# Patient Record
Sex: FEMALE | Race: WHITE | NOT HISPANIC OR LATINO | Employment: FULL TIME | URBAN - NONMETROPOLITAN AREA
[De-identification: names, ages, dates, MRNs, and addresses within clinical notes are randomized per-mention and may not be internally consistent; named-entity substitution may affect disease eponyms.]

---
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
## 2018-01-03 ENCOUNTER — APPOINTMENT (OUTPATIENT)
Dept: ULTRASOUND IMAGING | Facility: HOSPITAL | Age: 18
End: 2018-01-03

## 2018-01-03 ENCOUNTER — HOSPITAL ENCOUNTER (EMERGENCY)
Facility: HOSPITAL | Age: 18
Discharge: HOME | End: 2018-01-03
Attending: EMERGENCY MEDICINE | Admitting: EMERGENCY MEDICINE

## 2018-01-03 VITALS
SYSTOLIC BLOOD PRESSURE: 122 MMHG | WEIGHT: 135 LBS | TEMPERATURE: 98 F | HEIGHT: 65 IN | DIASTOLIC BLOOD PRESSURE: 71 MMHG | HEART RATE: 94 BPM | BODY MASS INDEX: 22.49 KG/M2 | OXYGEN SATURATION: 98 % | RESPIRATION RATE: 17 BRPM

## 2018-01-03 DIAGNOSIS — N39.0 ACUTE UTI: ICD-10-CM

## 2018-01-03 DIAGNOSIS — K80.50 BILIARY COLIC: Primary | ICD-10-CM

## 2018-01-03 LAB
ALBUMIN SERPL-MCNC: 4.7 G/DL (ref 3.5–5)
ALBUMIN/GLOB SERPL: 1.5 G/DL (ref 1.5–2.5)
ALP SERPL-CCNC: 73 U/L (ref 35–104)
ALT SERPL W P-5'-P-CCNC: 14 U/L (ref 10–36)
ANION GAP SERPL CALCULATED.3IONS-SCNC: 6.6 MMOL/L (ref 3.6–11.2)
AST SERPL-CCNC: 17 U/L (ref 10–30)
BACTERIA UR QL AUTO: ABNORMAL /HPF
BASOPHILS # BLD AUTO: 0.03 10*3/MM3 (ref 0–0.3)
BASOPHILS NFR BLD AUTO: 0.4 % (ref 0–2)
BILIRUB SERPL-MCNC: 0.4 MG/DL (ref 0.2–1.8)
BILIRUB UR QL STRIP: NEGATIVE
BUN BLD-MCNC: 14 MG/DL (ref 7–21)
BUN/CREAT SERPL: 17.9 (ref 7–25)
CALCIUM SPEC-SCNC: 9.5 MG/DL (ref 7.7–10)
CHLORIDE SERPL-SCNC: 106 MMOL/L (ref 99–112)
CLARITY UR: ABNORMAL
CO2 SERPL-SCNC: 28.4 MMOL/L (ref 24.3–31.9)
COLOR UR: YELLOW
CREAT BLD-MCNC: 0.78 MG/DL (ref 0.43–1.29)
DEPRECATED RDW RBC AUTO: 40.3 FL (ref 37–54)
EOSINOPHIL # BLD AUTO: 0.21 10*3/MM3 (ref 0–0.7)
EOSINOPHIL NFR BLD AUTO: 2.8 % (ref 0–5)
ERYTHROCYTE [DISTWIDTH] IN BLOOD BY AUTOMATED COUNT: 14 % (ref 11.5–14.5)
GFR SERPL CREATININE-BSD FRML MDRD: 95 ML/MIN/1.73
GLOBULIN UR ELPH-MCNC: 3.1 GM/DL
GLUCOSE BLD-MCNC: 90 MG/DL (ref 70–110)
GLUCOSE UR STRIP-MCNC: NEGATIVE MG/DL
HCG SERPL QL: NEGATIVE
HCT VFR BLD AUTO: 40.7 % (ref 37–47)
HGB BLD-MCNC: 13.2 G/DL (ref 12–16)
HGB UR QL STRIP.AUTO: NEGATIVE
HOLD SPECIMEN: NORMAL
HOLD SPECIMEN: NORMAL
HYALINE CASTS UR QL AUTO: ABNORMAL /LPF
IMM GRANULOCYTES # BLD: 0 10*3/MM3 (ref 0–0.03)
IMM GRANULOCYTES NFR BLD: 0 % (ref 0–0.5)
KETONES UR QL STRIP: NEGATIVE
LEUKOCYTE ESTERASE UR QL STRIP.AUTO: ABNORMAL
LIPASE SERPL-CCNC: 31 U/L (ref 13–60)
LYMPHOCYTES # BLD AUTO: 3.07 10*3/MM3 (ref 1–3)
LYMPHOCYTES NFR BLD AUTO: 41.7 % (ref 21–51)
MCH RBC QN AUTO: 26.3 PG (ref 27–33)
MCHC RBC AUTO-ENTMCNC: 32.4 G/DL (ref 33–37)
MCV RBC AUTO: 81.2 FL (ref 80–94)
MONOCYTES # BLD AUTO: 0.45 10*3/MM3 (ref 0.1–0.9)
MONOCYTES NFR BLD AUTO: 6.1 % (ref 0–10)
NEUTROPHILS # BLD AUTO: 3.61 10*3/MM3 (ref 1.4–6.5)
NEUTROPHILS NFR BLD AUTO: 49 % (ref 30–70)
NITRITE UR QL STRIP: POSITIVE
OSMOLALITY SERPL CALC.SUM OF ELEC: 281.3 MOSM/KG (ref 273–305)
PH UR STRIP.AUTO: 7.5 [PH] (ref 5–8)
PLATELET # BLD AUTO: 249 10*3/MM3 (ref 130–400)
PMV BLD AUTO: 11 FL (ref 6–10)
POTASSIUM BLD-SCNC: 4.1 MMOL/L (ref 3.5–5.3)
PROT SERPL-MCNC: 7.8 G/DL (ref 6–8)
PROT UR QL STRIP: ABNORMAL
RBC # BLD AUTO: 5.01 10*6/MM3 (ref 4.2–5.4)
RBC # UR: ABNORMAL /HPF
REF LAB TEST METHOD: ABNORMAL
SODIUM BLD-SCNC: 141 MMOL/L (ref 135–153)
SP GR UR STRIP: 1.02 (ref 1–1.03)
SQUAMOUS #/AREA URNS HPF: ABNORMAL /HPF
TRI-PHOS CRY URNS QL MICRO: ABNORMAL /HPF
UROBILINOGEN UR QL STRIP: ABNORMAL
WBC NRBC COR # BLD: 7.37 10*3/MM3 (ref 4.5–12.5)
WBC UR QL AUTO: ABNORMAL /HPF
WHOLE BLOOD HOLD SPECIMEN: NORMAL
WHOLE BLOOD HOLD SPECIMEN: NORMAL

## 2018-01-03 PROCEDURE — 87147 CULTURE TYPE IMMUNOLOGIC: CPT | Performed by: EMERGENCY MEDICINE

## 2018-01-03 PROCEDURE — 80053 COMPREHEN METABOLIC PANEL: CPT | Performed by: EMERGENCY MEDICINE

## 2018-01-03 PROCEDURE — 25010000002 CEFTRIAXONE PER 250 MG: Performed by: PHYSICIAN ASSISTANT

## 2018-01-03 PROCEDURE — 87086 URINE CULTURE/COLONY COUNT: CPT | Performed by: EMERGENCY MEDICINE

## 2018-01-03 PROCEDURE — 81001 URINALYSIS AUTO W/SCOPE: CPT | Performed by: EMERGENCY MEDICINE

## 2018-01-03 PROCEDURE — 85025 COMPLETE CBC W/AUTO DIFF WBC: CPT | Performed by: EMERGENCY MEDICINE

## 2018-01-03 PROCEDURE — 99283 EMERGENCY DEPT VISIT LOW MDM: CPT

## 2018-01-03 PROCEDURE — 76705 ECHO EXAM OF ABDOMEN: CPT

## 2018-01-03 PROCEDURE — 87077 CULTURE AEROBIC IDENTIFY: CPT | Performed by: EMERGENCY MEDICINE

## 2018-01-03 PROCEDURE — 87186 SC STD MICRODIL/AGAR DIL: CPT | Performed by: EMERGENCY MEDICINE

## 2018-01-03 PROCEDURE — 84703 CHORIONIC GONADOTROPIN ASSAY: CPT | Performed by: EMERGENCY MEDICINE

## 2018-01-03 PROCEDURE — 96372 THER/PROPH/DIAG INJ SC/IM: CPT

## 2018-01-03 PROCEDURE — 83690 ASSAY OF LIPASE: CPT | Performed by: EMERGENCY MEDICINE

## 2018-01-03 PROCEDURE — 76705 ECHO EXAM OF ABDOMEN: CPT | Performed by: RADIOLOGY

## 2018-01-03 RX ORDER — SULFAMETHOXAZOLE AND TRIMETHOPRIM 800; 160 MG/1; MG/1
1 TABLET ORAL 2 TIMES DAILY
COMMUNITY
End: 2018-01-03

## 2018-01-03 RX ORDER — LIDOCAINE HYDROCHLORIDE 10 MG/ML
2.1 INJECTION, SOLUTION EPIDURAL; INFILTRATION; INTRACAUDAL; PERINEURAL ONCE
Status: COMPLETED | OUTPATIENT
Start: 2018-01-03 | End: 2018-01-03

## 2018-01-03 RX ORDER — SULFAMETHOXAZOLE AND TRIMETHOPRIM 800; 160 MG/1; MG/1
1 TABLET ORAL 2 TIMES DAILY
Qty: 20 TABLET | Refills: 0 | Status: SHIPPED | OUTPATIENT
Start: 2018-01-03 | End: 2019-04-01 | Stop reason: HOSPADM

## 2018-01-03 RX ORDER — ONDANSETRON 4 MG/1
4 TABLET, ORALLY DISINTEGRATING ORAL EVERY 6 HOURS PRN
Qty: 10 TABLET | Refills: 0 | Status: ON HOLD | OUTPATIENT
Start: 2018-01-03 | End: 2019-07-03

## 2018-01-03 RX ORDER — CEFTRIAXONE 1 G/1
1000 INJECTION, POWDER, FOR SOLUTION INTRAMUSCULAR; INTRAVENOUS ONCE
Status: COMPLETED | OUTPATIENT
Start: 2018-01-03 | End: 2018-01-03

## 2018-01-03 RX ORDER — PANTOPRAZOLE SODIUM 40 MG/1
40 TABLET, DELAYED RELEASE ORAL DAILY
Qty: 30 TABLET | Refills: 0 | Status: ON HOLD | OUTPATIENT
Start: 2018-01-03 | End: 2019-07-03

## 2018-01-03 RX ORDER — ONDANSETRON 4 MG/1
4 TABLET, ORALLY DISINTEGRATING ORAL ONCE
Status: COMPLETED | OUTPATIENT
Start: 2018-01-03 | End: 2018-01-03

## 2018-01-03 RX ADMIN — CEFTRIAXONE 1000 MG: 1 INJECTION, POWDER, FOR SOLUTION INTRAMUSCULAR; INTRAVENOUS at 15:59

## 2018-01-03 RX ADMIN — ONDANSETRON 4 MG: 4 TABLET, ORALLY DISINTEGRATING ORAL at 15:58

## 2018-01-03 RX ADMIN — LIDOCAINE HYDROCHLORIDE 2.1 ML: 10 INJECTION, SOLUTION EPIDURAL; INFILTRATION; INTRACAUDAL; PERINEURAL at 15:59

## 2018-01-03 NOTE — ED PROVIDER NOTES
Subjective   Patient is a 17 y.o. female presenting with abdominal pain.   History provided by:  Patient  Abdominal Pain   Pain location:  Epigastric  Pain quality: aching and stabbing    Pain radiates to:  Does not radiate  Pain severity:  Moderate  Onset quality:  Sudden  Duration:  2 hours  Timing:  Constant  Progression:  Worsening  Chronicity:  Recurrent  Relieved by:  Nothing  Ineffective treatments:  None tried  Associated symptoms: nausea    Associated symptoms: no chest pain, no dysuria, no fever and no vomiting        Review of Systems   Constitutional: Negative.  Negative for fever.   HENT: Negative.    Respiratory: Negative.    Cardiovascular: Negative.  Negative for chest pain.   Gastrointestinal: Positive for abdominal pain and nausea. Negative for vomiting.   Endocrine: Negative.    Genitourinary: Negative.  Negative for dysuria.   Skin: Negative.    Neurological: Negative.    Psychiatric/Behavioral: Negative.    All other systems reviewed and are negative.      Past Medical History:   Diagnosis Date   • H/O seasonal allergies    • Psoriasis    • Psoriasis        No Known Allergies    Past Surgical History:   Procedure Laterality Date   • DILATATION AND CURETTAGE N/A 9/1/2017    Procedure: DILATATION AND CURETTAGE;  Surgeon: Juan Pablo Wood MD;  Location: [facility];  Service:        Family History   Problem Relation Age of Onset   • Diabetes Father        Social History     Social History   • Marital status: Single     Spouse name: N/A   • Number of children: N/A   • Years of education: N/A     Social History Main Topics   • Smoking status: Former Smoker     Packs/day: 0.50     Types: Cigarettes   • Smokeless tobacco: Never Used   • Alcohol use No   • Drug use: No   • Sexual activity: Defer     Other Topics Concern   • Not on file     Social History Narrative   • No narrative on file           Objective   Physical Exam   Constitutional: She is oriented to person, place, and time. She appears  well-developed and well-nourished. No distress.   HENT:   Head: Normocephalic and atraumatic.   Nose: Nose normal.   Eyes: Conjunctivae are normal.   Neck: Normal range of motion. Neck supple. No JVD present. No tracheal deviation present.   Cardiovascular: Normal rate, regular rhythm and normal heart sounds.    No murmur heard.  Pulmonary/Chest: Effort normal and breath sounds normal. No respiratory distress. She has no wheezes.   Abdominal: Soft. Bowel sounds are normal. There is tenderness (epigastric / RUQ).   Musculoskeletal: Normal range of motion. She exhibits no edema or deformity.   Neurological: She is alert and oriented to person, place, and time. No cranial nerve deficit.   Skin: Skin is warm and dry. No rash noted. She is not diaphoretic. No erythema. No pallor.   Psychiatric: She has a normal mood and affect. Her behavior is normal. Thought content normal.   Nursing note and vitals reviewed.      Procedures         ED Course  ED Course   Comment By Time   Patient is requesting to leave. JANY Tariq 01/03 1632   Ultrasound gallbladder interpreted by virtual radiology: No acute findings. JANY Tariq 01/04 0046                  MDM  Number of Diagnoses or Management Options  Acute UTI: new and requires workup  Biliary colic: new and requires workup     Amount and/or Complexity of Data Reviewed  Clinical lab tests: reviewed    Risk of Complications, Morbidity, and/or Mortality  Presenting problems: low  Diagnostic procedures: low  Management options: low    Patient Progress  Patient progress: stable      Final diagnoses:   Biliary colic   Acute UTI            JANY Tariq  01/04/18 0049

## 2018-01-06 LAB
BACTERIA SPEC AEROBE CULT: ABNORMAL

## 2018-01-22 ENCOUNTER — HOSPITAL ENCOUNTER (EMERGENCY)
Facility: HOSPITAL | Age: 18
Discharge: HOME | End: 2018-01-22
Attending: EMERGENCY MEDICINE | Admitting: EMERGENCY MEDICINE

## 2018-01-22 ENCOUNTER — APPOINTMENT (OUTPATIENT)
Dept: CT IMAGING | Facility: HOSPITAL | Age: 18
End: 2018-01-22

## 2018-01-22 VITALS
RESPIRATION RATE: 16 BRPM | SYSTOLIC BLOOD PRESSURE: 115 MMHG | HEIGHT: 65 IN | BODY MASS INDEX: 23.32 KG/M2 | HEART RATE: 75 BPM | TEMPERATURE: 98.2 F | DIASTOLIC BLOOD PRESSURE: 78 MMHG | WEIGHT: 140 LBS | OXYGEN SATURATION: 99 %

## 2018-01-22 DIAGNOSIS — K80.50 BILIARY COLIC: Primary | ICD-10-CM

## 2018-01-22 LAB
ALBUMIN SERPL-MCNC: 4.4 G/DL (ref 3.5–5)
ALBUMIN/GLOB SERPL: 1.4 G/DL (ref 1.5–2.5)
ALP SERPL-CCNC: 77 U/L (ref 35–104)
ALT SERPL W P-5'-P-CCNC: 14 U/L (ref 10–36)
ANION GAP SERPL CALCULATED.3IONS-SCNC: 4 MMOL/L (ref 3.6–11.2)
AST SERPL-CCNC: 20 U/L (ref 10–30)
BACTERIA UR QL AUTO: ABNORMAL /HPF
BASOPHILS # BLD AUTO: 0.01 10*3/MM3 (ref 0–0.3)
BASOPHILS NFR BLD AUTO: 0.2 % (ref 0–2)
BILIRUB SERPL-MCNC: 0.1 MG/DL (ref 0.2–1.8)
BILIRUB UR QL STRIP: NEGATIVE
BUN BLD-MCNC: 13 MG/DL (ref 7–21)
BUN/CREAT SERPL: 16.5 (ref 7–25)
CALCIUM SPEC-SCNC: 9.2 MG/DL (ref 7.7–10)
CHLORIDE SERPL-SCNC: 110 MMOL/L (ref 99–112)
CLARITY UR: CLEAR
CO2 SERPL-SCNC: 28 MMOL/L (ref 24.3–31.9)
COLOR UR: YELLOW
CREAT BLD-MCNC: 0.79 MG/DL (ref 0.43–1.29)
DEPRECATED RDW RBC AUTO: 40.6 FL (ref 37–54)
EOSINOPHIL # BLD AUTO: 0.08 10*3/MM3 (ref 0–0.7)
EOSINOPHIL NFR BLD AUTO: 1.5 % (ref 0–5)
ERYTHROCYTE [DISTWIDTH] IN BLOOD BY AUTOMATED COUNT: 13.5 % (ref 11.5–14.5)
GFR SERPL CREATININE-BSD FRML MDRD: 94 ML/MIN/1.73
GLOBULIN UR ELPH-MCNC: 3.1 GM/DL
GLUCOSE BLD-MCNC: 90 MG/DL (ref 70–110)
GLUCOSE UR STRIP-MCNC: NEGATIVE MG/DL
HCG SERPL QL: NEGATIVE
HCT VFR BLD AUTO: 40.3 % (ref 37–47)
HGB BLD-MCNC: 12.9 G/DL (ref 12–16)
HGB UR QL STRIP.AUTO: NEGATIVE
HOLD SPECIMEN: NORMAL
HOLD SPECIMEN: NORMAL
HYALINE CASTS UR QL AUTO: ABNORMAL /LPF
IMM GRANULOCYTES # BLD: 0.01 10*3/MM3 (ref 0–0.03)
IMM GRANULOCYTES NFR BLD: 0.2 % (ref 0–0.5)
KETONES UR QL STRIP: NEGATIVE
LEUKOCYTE ESTERASE UR QL STRIP.AUTO: ABNORMAL
LIPASE SERPL-CCNC: 46 U/L (ref 13–60)
LYMPHOCYTES # BLD AUTO: 2.08 10*3/MM3 (ref 1–3)
LYMPHOCYTES NFR BLD AUTO: 38.4 % (ref 21–51)
MCH RBC QN AUTO: 26.3 PG (ref 27–33)
MCHC RBC AUTO-ENTMCNC: 32 G/DL (ref 33–37)
MCV RBC AUTO: 82.1 FL (ref 80–94)
MONOCYTES # BLD AUTO: 0.28 10*3/MM3 (ref 0.1–0.9)
MONOCYTES NFR BLD AUTO: 5.2 % (ref 0–10)
NEUTROPHILS # BLD AUTO: 2.95 10*3/MM3 (ref 1.4–6.5)
NEUTROPHILS NFR BLD AUTO: 54.5 % (ref 30–70)
NITRITE UR QL STRIP: NEGATIVE
OSMOLALITY SERPL CALC.SUM OF ELEC: 282.8 MOSM/KG (ref 273–305)
PH UR STRIP.AUTO: 8.5 [PH] (ref 5–8)
PLATELET # BLD AUTO: 209 10*3/MM3 (ref 130–400)
PMV BLD AUTO: 10.6 FL (ref 6–10)
POTASSIUM BLD-SCNC: 4 MMOL/L (ref 3.5–5.3)
PROT SERPL-MCNC: 7.5 G/DL (ref 6–8)
PROT UR QL STRIP: ABNORMAL
RBC # BLD AUTO: 4.91 10*6/MM3 (ref 4.2–5.4)
RBC # UR: ABNORMAL /HPF
REF LAB TEST METHOD: ABNORMAL
SODIUM BLD-SCNC: 142 MMOL/L (ref 135–153)
SP GR UR STRIP: 1.02 (ref 1–1.03)
SQUAMOUS #/AREA URNS HPF: ABNORMAL /HPF
UROBILINOGEN UR QL STRIP: ABNORMAL
WBC NRBC COR # BLD: 5.41 10*3/MM3 (ref 4.5–12.5)
WBC UR QL AUTO: ABNORMAL /HPF
WHOLE BLOOD HOLD SPECIMEN: NORMAL
WHOLE BLOOD HOLD SPECIMEN: NORMAL

## 2018-01-22 PROCEDURE — 99283 EMERGENCY DEPT VISIT LOW MDM: CPT

## 2018-01-22 PROCEDURE — 85025 COMPLETE CBC W/AUTO DIFF WBC: CPT | Performed by: EMERGENCY MEDICINE

## 2018-01-22 PROCEDURE — 74176 CT ABD & PELVIS W/O CONTRAST: CPT

## 2018-01-22 PROCEDURE — 83690 ASSAY OF LIPASE: CPT | Performed by: EMERGENCY MEDICINE

## 2018-01-22 PROCEDURE — 80053 COMPREHEN METABOLIC PANEL: CPT | Performed by: EMERGENCY MEDICINE

## 2018-01-22 PROCEDURE — 87186 SC STD MICRODIL/AGAR DIL: CPT | Performed by: EMERGENCY MEDICINE

## 2018-01-22 PROCEDURE — 74176 CT ABD & PELVIS W/O CONTRAST: CPT | Performed by: RADIOLOGY

## 2018-01-22 PROCEDURE — 81001 URINALYSIS AUTO W/SCOPE: CPT | Performed by: EMERGENCY MEDICINE

## 2018-01-22 PROCEDURE — 87147 CULTURE TYPE IMMUNOLOGIC: CPT | Performed by: EMERGENCY MEDICINE

## 2018-01-22 PROCEDURE — 84703 CHORIONIC GONADOTROPIN ASSAY: CPT | Performed by: EMERGENCY MEDICINE

## 2018-01-22 PROCEDURE — 87077 CULTURE AEROBIC IDENTIFY: CPT | Performed by: EMERGENCY MEDICINE

## 2018-01-22 PROCEDURE — 87086 URINE CULTURE/COLONY COUNT: CPT | Performed by: EMERGENCY MEDICINE

## 2018-01-22 RX ORDER — ONDANSETRON 4 MG/1
4 TABLET, ORALLY DISINTEGRATING ORAL ONCE
Status: COMPLETED | OUTPATIENT
Start: 2018-01-22 | End: 2018-01-22

## 2018-01-22 RX ORDER — SODIUM CHLORIDE 0.9 % (FLUSH) 0.9 %
10 SYRINGE (ML) INJECTION AS NEEDED
Status: DISCONTINUED | OUTPATIENT
Start: 2018-01-22 | End: 2018-01-23 | Stop reason: HOSPADM

## 2018-01-22 RX ORDER — ONDANSETRON 4 MG/1
4 TABLET, ORALLY DISINTEGRATING ORAL EVERY 6 HOURS PRN
Qty: 20 TABLET | Refills: 0 | Status: ON HOLD | OUTPATIENT
Start: 2018-01-22 | End: 2019-07-03

## 2018-01-22 RX ADMIN — ONDANSETRON 4 MG: 4 TABLET, ORALLY DISINTEGRATING ORAL at 20:00

## 2018-01-22 NOTE — ED TRIAGE NOTES
Attempted to triage at this time; pt family members state she went outside to smoke and that she will be back in shortly

## 2018-01-23 NOTE — ED NOTES
Patient was seen in ER about 2 weeks ago for same complaint. Patient has been experiencing upper abdominal pain, particularly after eating.      Aleida Bradley RN  01/22/18 2018

## 2018-01-23 NOTE — ED PROVIDER NOTES
Subjective   Patient is a 17 y.o. female presenting with abdominal pain.   History provided by:  Patient  Abdominal Pain   Pain location:  Epigastric and RUQ  Pain quality: gnawing    Pain radiates to:  Epigastric region  Pain severity:  Moderate  Onset quality:  Gradual  Timing:  Constant  Progression:  Worsening  Chronicity:  Recurrent  Context: eating    Relieved by:  Nothing  Associated symptoms: nausea and vomiting    Associated symptoms: no chest pain, no dysuria and no fever        Review of Systems   Constitutional: Negative.  Negative for fever.   HENT: Negative.    Respiratory: Negative.    Cardiovascular: Negative.  Negative for chest pain.   Gastrointestinal: Positive for abdominal pain, nausea and vomiting.   Endocrine: Negative.    Genitourinary: Negative.  Negative for dysuria.   Skin: Negative.    Neurological: Negative.    Psychiatric/Behavioral: Negative.    All other systems reviewed and are negative.      Past Medical History:   Diagnosis Date   • H/O seasonal allergies    • Psoriasis    • Psoriasis        No Known Allergies    Past Surgical History:   Procedure Laterality Date   • DILATATION AND CURETTAGE N/A 9/1/2017    Procedure: DILATATION AND CURETTAGE;  Surgeon: Juan Pablo Wood MD;  Location: [facility];  Service:        Family History   Problem Relation Age of Onset   • Diabetes Father        Social History     Social History   • Marital status: Single     Spouse name: N/A   • Number of children: N/A   • Years of education: N/A     Social History Main Topics   • Smoking status: Former Smoker     Packs/day: 0.50     Types: Cigarettes   • Smokeless tobacco: Never Used   • Alcohol use No   • Drug use: No   • Sexual activity: Defer     Other Topics Concern   • None     Social History Narrative   • None           Objective   Physical Exam   Constitutional: She is oriented to person, place, and time. She appears well-developed and well-nourished. No distress.   HENT:   Head: Normocephalic and  atraumatic.   Nose: Nose normal.   Eyes: Conjunctivae are normal.   Neck: Normal range of motion. Neck supple. No JVD present. No tracheal deviation present.   Cardiovascular: Normal rate, regular rhythm and normal heart sounds.    No murmur heard.  Pulmonary/Chest: Effort normal and breath sounds normal. No respiratory distress. She has no wheezes.   Abdominal: Soft. Bowel sounds are normal. There is tenderness (epigastric / RUQ).   Musculoskeletal: Normal range of motion. She exhibits no edema or deformity.   Neurological: She is alert and oriented to person, place, and time. No cranial nerve deficit.   Skin: Skin is warm and dry. No rash noted. She is not diaphoretic. No erythema. No pallor.   Psychiatric: She has a normal mood and affect. Her behavior is normal. Thought content normal.   Nursing note and vitals reviewed.      Procedures         ED Course  ED Course   Comment By Time   CT abdomen and pelvis interpreted by virtual radiology: No acute findings. JANY Tariq 01/22 6159                  MDM  Number of Diagnoses or Management Options  Biliary colic: established and worsening     Amount and/or Complexity of Data Reviewed  Clinical lab tests: reviewed  Tests in the radiology section of CPT®: reviewed  Decide to obtain previous medical records or to obtain history from someone other than the patient: yes    Risk of Complications, Morbidity, and/or Mortality  Presenting problems: moderate  Diagnostic procedures: moderate  Management options: low    Patient Progress  Patient progress: stable      Final diagnoses:   Biliary colic            JANY Tariq  01/22/18 0644

## 2018-01-26 LAB
BACTERIA SPEC AEROBE CULT: ABNORMAL
BACTERIA SPEC AEROBE CULT: ABNORMAL

## 2018-02-21 ENCOUNTER — TRANSCRIBE ORDERS (OUTPATIENT)
Dept: ADMINISTRATIVE | Facility: HOSPITAL | Age: 18
End: 2018-02-21

## 2018-02-21 DIAGNOSIS — R10.84 ABDOMINAL PAIN, GENERALIZED: Primary | ICD-10-CM

## 2018-02-27 ENCOUNTER — HOSPITAL ENCOUNTER (OUTPATIENT)
Dept: NUCLEAR MEDICINE | Facility: HOSPITAL | Age: 18
Discharge: HOME | End: 2018-02-27

## 2018-02-27 DIAGNOSIS — R10.84 ABDOMINAL PAIN, GENERALIZED: ICD-10-CM

## 2018-02-27 PROCEDURE — A9537 TC99M MEBROFENIN: HCPCS | Performed by: PHYSICIAN ASSISTANT

## 2018-02-27 PROCEDURE — 78227 HEPATOBIL SYST IMAGE W/DRUG: CPT

## 2018-02-27 PROCEDURE — 0 TECHNETIUM TC 99M MEBROFENIN KIT: Performed by: PHYSICIAN ASSISTANT

## 2018-02-27 PROCEDURE — 78227 HEPATOBIL SYST IMAGE W/DRUG: CPT | Performed by: RADIOLOGY

## 2018-02-27 RX ORDER — KIT FOR THE PREPARATION OF TECHNETIUM TC 99M MEBROFENIN 45 MG/10ML
1 INJECTION, POWDER, LYOPHILIZED, FOR SOLUTION INTRAVENOUS
Status: COMPLETED | OUTPATIENT
Start: 2018-02-27 | End: 2018-02-27

## 2018-02-27 RX ADMIN — MEBROFENIN 1 DOSE: 45 INJECTION, POWDER, LYOPHILIZED, FOR SOLUTION INTRAVENOUS at 12:40

## 2019-02-13 LAB
EXTERNAL ABO GROUPING: NORMAL
EXTERNAL ANTIBODY SCREEN: NEGATIVE
EXTERNAL HEPATITIS B SURFACE ANTIGEN: NEGATIVE
EXTERNAL RH FACTOR: NEGATIVE
EXTERNAL RUBELLA QUALITATIVE: NORMAL
EXTERNAL SYPHILIS RPR SCREEN: NORMAL
HIV1 P24 AG SERPL QL IA: NORMAL

## 2019-03-07 ENCOUNTER — HOSPITAL ENCOUNTER (OUTPATIENT)
Facility: HOSPITAL | Age: 19
Discharge: HOME | End: 2019-03-07
Attending: OBSTETRICS & GYNECOLOGY | Admitting: OBSTETRICS & GYNECOLOGY

## 2019-03-07 VITALS
WEIGHT: 165 LBS | HEIGHT: 65 IN | SYSTOLIC BLOOD PRESSURE: 117 MMHG | RESPIRATION RATE: 18 BRPM | BODY MASS INDEX: 27.49 KG/M2 | DIASTOLIC BLOOD PRESSURE: 62 MMHG | HEART RATE: 90 BPM | TEMPERATURE: 97.9 F

## 2019-03-07 LAB
BILIRUB UR QL STRIP: NEGATIVE
CLARITY UR: CLEAR
COLOR UR: YELLOW
GLUCOSE UR STRIP-MCNC: NEGATIVE MG/DL
HGB UR QL STRIP.AUTO: NEGATIVE
KETONES UR QL STRIP: NEGATIVE
LEUKOCYTE ESTERASE UR QL STRIP.AUTO: NEGATIVE
NITRITE UR QL STRIP: NEGATIVE
PH UR STRIP.AUTO: 6.5 [PH] (ref 5–8)
PROT UR QL STRIP: NEGATIVE
SP GR UR STRIP: 1.02 (ref 1–1.03)
UROBILINOGEN UR QL STRIP: NORMAL

## 2019-03-07 PROCEDURE — P9612 CATHETERIZE FOR URINE SPEC: HCPCS

## 2019-03-07 PROCEDURE — 81003 URINALYSIS AUTO W/O SCOPE: CPT | Performed by: OBSTETRICS & GYNECOLOGY

## 2019-03-07 PROCEDURE — G0463 HOSPITAL OUTPT CLINIC VISIT: HCPCS

## 2019-04-01 ENCOUNTER — HOSPITAL ENCOUNTER (OUTPATIENT)
Facility: HOSPITAL | Age: 19
Discharge: HOME | End: 2019-04-01
Attending: OBSTETRICS & GYNECOLOGY | Admitting: OBSTETRICS & GYNECOLOGY

## 2019-04-01 VITALS
HEART RATE: 85 BPM | WEIGHT: 166 LBS | BODY MASS INDEX: 27.66 KG/M2 | SYSTOLIC BLOOD PRESSURE: 123 MMHG | RESPIRATION RATE: 20 BRPM | DIASTOLIC BLOOD PRESSURE: 58 MMHG | HEIGHT: 65 IN

## 2019-04-01 PROBLEM — O26.899 ABDOMINAL PAIN DURING PREGNANCY: Status: ACTIVE | Noted: 2019-04-01

## 2019-04-01 PROBLEM — R10.9 ABDOMINAL PAIN DURING PREGNANCY: Status: ACTIVE | Noted: 2019-04-01

## 2019-04-01 LAB
BACTERIA UR QL AUTO: ABNORMAL /HPF
BASOPHILS # BLD AUTO: 0.02 10*3/MM3 (ref 0–0.2)
BASOPHILS NFR BLD AUTO: 0.2 % (ref 0–1.5)
BILIRUB UR QL STRIP: NEGATIVE
CLARITY UR: ABNORMAL
COLOR UR: YELLOW
DEPRECATED RDW RBC AUTO: 40.5 FL (ref 37–54)
EOSINOPHIL # BLD AUTO: 0.11 10*3/MM3 (ref 0–0.4)
EOSINOPHIL NFR BLD AUTO: 1.1 % (ref 0.3–6.2)
ERYTHROCYTE [DISTWIDTH] IN BLOOD BY AUTOMATED COUNT: 13.1 % (ref 12.3–15.4)
GLUCOSE UR STRIP-MCNC: NEGATIVE MG/DL
HCT VFR BLD AUTO: 33.9 % (ref 34–46.6)
HGB BLD-MCNC: 11.2 G/DL (ref 12–15.9)
HGB UR QL STRIP.AUTO: ABNORMAL
HYALINE CASTS UR QL AUTO: ABNORMAL /LPF
IMM GRANULOCYTES # BLD AUTO: 0.02 10*3/MM3 (ref 0–0.05)
IMM GRANULOCYTES NFR BLD AUTO: 0.2 % (ref 0–0.5)
KETONES UR QL STRIP: NEGATIVE
LEUKOCYTE ESTERASE UR QL STRIP.AUTO: ABNORMAL
LYMPHOCYTES # BLD AUTO: 1.83 10*3/MM3 (ref 0.7–3.1)
LYMPHOCYTES NFR BLD AUTO: 17.9 % (ref 19.6–45.3)
MCH RBC QN AUTO: 28.7 PG (ref 26.6–33)
MCHC RBC AUTO-ENTMCNC: 33 G/DL (ref 31.5–35.7)
MCV RBC AUTO: 86.9 FL (ref 79–97)
MONOCYTES # BLD AUTO: 0.65 10*3/MM3 (ref 0.1–0.9)
MONOCYTES NFR BLD AUTO: 6.4 % (ref 5–12)
NEUTROPHILS # BLD AUTO: 7.57 10*3/MM3 (ref 1.4–7)
NEUTROPHILS NFR BLD AUTO: 74.2 % (ref 42.7–76)
NITRITE UR QL STRIP: POSITIVE
PH UR STRIP.AUTO: 6 [PH] (ref 5–8)
PLATELET # BLD AUTO: 200 10*3/MM3 (ref 140–450)
PMV BLD AUTO: 10.4 FL (ref 6–12)
PROT UR QL STRIP: ABNORMAL
RBC # BLD AUTO: 3.9 10*6/MM3 (ref 3.77–5.28)
RBC # UR: ABNORMAL /HPF
REF LAB TEST METHOD: ABNORMAL
SP GR UR STRIP: 1.02 (ref 1–1.03)
SQUAMOUS #/AREA URNS HPF: ABNORMAL /HPF
UROBILINOGEN UR QL STRIP: ABNORMAL
WBC NRBC COR # BLD: 10.2 10*3/MM3 (ref 3.4–10.8)
WBC UR QL AUTO: ABNORMAL /HPF

## 2019-04-01 PROCEDURE — 36415 COLL VENOUS BLD VENIPUNCTURE: CPT | Performed by: OBSTETRICS & GYNECOLOGY

## 2019-04-01 PROCEDURE — 87086 URINE CULTURE/COLONY COUNT: CPT | Performed by: OBSTETRICS & GYNECOLOGY

## 2019-04-01 PROCEDURE — 81001 URINALYSIS AUTO W/SCOPE: CPT | Performed by: OBSTETRICS & GYNECOLOGY

## 2019-04-01 PROCEDURE — 85025 COMPLETE CBC W/AUTO DIFF WBC: CPT | Performed by: OBSTETRICS & GYNECOLOGY

## 2019-04-01 PROCEDURE — G0463 HOSPITAL OUTPT CLINIC VISIT: HCPCS

## 2019-04-01 PROCEDURE — 87186 SC STD MICRODIL/AGAR DIL: CPT | Performed by: OBSTETRICS & GYNECOLOGY

## 2019-04-01 RX ORDER — NITROFURANTOIN 25; 75 MG/1; MG/1
100 CAPSULE ORAL EVERY 12 HOURS SCHEDULED
Qty: 10 CAPSULE | Refills: 0 | Status: SHIPPED | OUTPATIENT
Start: 2019-04-01 | End: 2019-04-06

## 2019-04-01 RX ORDER — DEXTROSE, SODIUM CHLORIDE, SODIUM LACTATE, POTASSIUM CHLORIDE, AND CALCIUM CHLORIDE 5; .6; .31; .03; .02 G/100ML; G/100ML; G/100ML; G/100ML; G/100ML
500 INJECTION, SOLUTION INTRAVENOUS CONTINUOUS
Status: DISCONTINUED | OUTPATIENT
Start: 2019-04-01 | End: 2019-04-01 | Stop reason: HOSPADM

## 2019-04-01 RX ORDER — NITROFURANTOIN 25; 75 MG/1; MG/1
100 CAPSULE ORAL EVERY 12 HOURS SCHEDULED
Qty: 10 CAPSULE | Refills: 0 | Status: SHIPPED | OUTPATIENT
Start: 2019-04-01 | End: 2019-04-01

## 2019-04-01 RX ORDER — NITROFURANTOIN 25; 75 MG/1; MG/1
100 CAPSULE ORAL EVERY 12 HOURS SCHEDULED
Status: DISCONTINUED | OUTPATIENT
Start: 2019-04-01 | End: 2019-04-01 | Stop reason: HOSPADM

## 2019-04-01 RX ORDER — ONDANSETRON 4 MG/1
4 TABLET, FILM COATED ORAL EVERY 6 HOURS PRN
Status: DISCONTINUED | OUTPATIENT
Start: 2019-04-01 | End: 2019-04-01 | Stop reason: HOSPADM

## 2019-04-01 RX ORDER — ONDANSETRON 4 MG/1
4 TABLET, ORALLY DISINTEGRATING ORAL EVERY 6 HOURS PRN
Status: DISCONTINUED | OUTPATIENT
Start: 2019-04-01 | End: 2019-04-01 | Stop reason: HOSPADM

## 2019-04-01 RX ORDER — ONDANSETRON 2 MG/ML
4 INJECTION INTRAMUSCULAR; INTRAVENOUS EVERY 6 HOURS PRN
Status: DISCONTINUED | OUTPATIENT
Start: 2019-04-01 | End: 2019-04-01 | Stop reason: HOSPADM

## 2019-04-01 RX ADMIN — NITROFURANTOIN MONOHYDRATE/MACROCRYSTALLINE 100 MG: 25; 75 CAPSULE ORAL at 20:22

## 2019-04-01 RX ADMIN — SODIUM CHLORIDE, SODIUM LACTATE, POTASSIUM CHLORIDE, CALCIUM CHLORIDE AND DEXTROSE MONOHYDRATE 500 ML/HR: 5; 600; 310; 30; 20 INJECTION, SOLUTION INTRAVENOUS at 19:57

## 2019-04-03 LAB — BACTERIA SPEC AEROBE CULT: ABNORMAL

## 2019-06-08 ENCOUNTER — HOSPITAL ENCOUNTER (OUTPATIENT)
Facility: HOSPITAL | Age: 19
Discharge: HOME | End: 2019-06-08
Attending: OBSTETRICS & GYNECOLOGY | Admitting: OBSTETRICS & GYNECOLOGY

## 2019-06-08 VITALS
TEMPERATURE: 97.9 F | RESPIRATION RATE: 18 BRPM | BODY MASS INDEX: 29.82 KG/M2 | HEART RATE: 96 BPM | WEIGHT: 179 LBS | HEIGHT: 65 IN | DIASTOLIC BLOOD PRESSURE: 58 MMHG | SYSTOLIC BLOOD PRESSURE: 115 MMHG

## 2019-06-08 LAB
A1 MICROGLOB PLACENTAL VAG QL: NEGATIVE
BACTERIA UR QL AUTO: ABNORMAL /HPF
BILIRUB UR QL STRIP: NEGATIVE
CLARITY UR: CLEAR
COLOR UR: YELLOW
GLUCOSE UR STRIP-MCNC: NEGATIVE MG/DL
HGB UR QL STRIP.AUTO: NEGATIVE
HYALINE CASTS UR QL AUTO: ABNORMAL /LPF
KETONES UR QL STRIP: ABNORMAL
LEUKOCYTE ESTERASE UR QL STRIP.AUTO: ABNORMAL
NITRITE UR QL STRIP: POSITIVE
PH UR STRIP.AUTO: 7 [PH] (ref 5–8)
PROT UR QL STRIP: NEGATIVE
RBC # UR: ABNORMAL /HPF
REF LAB TEST METHOD: ABNORMAL
SP GR UR STRIP: 1.02 (ref 1–1.03)
SQUAMOUS #/AREA URNS HPF: ABNORMAL /HPF
UROBILINOGEN UR QL STRIP: ABNORMAL
WBC UR QL AUTO: ABNORMAL /HPF

## 2019-06-08 PROCEDURE — 84112 EVAL AMNIOTIC FLUID PROTEIN: CPT | Performed by: OBSTETRICS & GYNECOLOGY

## 2019-06-08 PROCEDURE — G0463 HOSPITAL OUTPT CLINIC VISIT: HCPCS

## 2019-06-08 PROCEDURE — 87186 SC STD MICRODIL/AGAR DIL: CPT | Performed by: OBSTETRICS & GYNECOLOGY

## 2019-06-08 PROCEDURE — 59025 FETAL NON-STRESS TEST: CPT

## 2019-06-08 PROCEDURE — 25010000002 CEFTRIAXONE: Performed by: OBSTETRICS & GYNECOLOGY

## 2019-06-08 PROCEDURE — 87086 URINE CULTURE/COLONY COUNT: CPT | Performed by: OBSTETRICS & GYNECOLOGY

## 2019-06-08 PROCEDURE — 87088 URINE BACTERIA CULTURE: CPT | Performed by: OBSTETRICS & GYNECOLOGY

## 2019-06-08 PROCEDURE — P9612 CATHETERIZE FOR URINE SPEC: HCPCS

## 2019-06-08 PROCEDURE — 81001 URINALYSIS AUTO W/SCOPE: CPT | Performed by: OBSTETRICS & GYNECOLOGY

## 2019-06-08 RX ORDER — NITROFURANTOIN 25; 75 MG/1; MG/1
100 CAPSULE ORAL EVERY 12 HOURS SCHEDULED
Status: DISCONTINUED | OUTPATIENT
Start: 2019-06-09 | End: 2019-06-09 | Stop reason: HOSPADM

## 2019-06-08 RX ORDER — SODIUM CHLORIDE 9 MG/ML
INJECTION, SOLUTION INTRAVENOUS
Status: COMPLETED
Start: 2019-06-08 | End: 2019-06-08

## 2019-06-08 RX ORDER — SODIUM CHLORIDE 9 MG/ML
999 INJECTION, SOLUTION INTRAVENOUS CONTINUOUS
Status: DISCONTINUED | OUTPATIENT
Start: 2019-06-08 | End: 2019-06-09 | Stop reason: HOSPADM

## 2019-06-08 RX ADMIN — SODIUM CHLORIDE 999 ML/HR: 9 INJECTION, SOLUTION INTRAVENOUS at 21:27

## 2019-06-08 RX ADMIN — CEFTRIAXONE 2 G: 2 INJECTION, POWDER, FOR SOLUTION INTRAMUSCULAR; INTRAVENOUS at 21:50

## 2019-06-09 NOTE — NON STRESS TEST
Bianca Nieves, a  at 34w3d with an JAME of 2019, by Other Basis, was seen at [facility] LABOR DELIVERY for a nonstress test.    Chief Complaint   Patient presents with   • Abdominal Cramping     since yesterday. denies bleeding. baby is active per pt report.    • Vaginal Discharge     ?leaking for last 2 days.       Patient Active Problem List   Diagnosis   • Right upper quadrant abdominal pain   • Leukocytosis   • Pregnant   • Abdominal pain affecting pregnancy   • Pregnancy   • Abdominal pain during pregnancy       Start Time:   Stop Time:     Interpretation A  Nonstress Test Interpretation A: Reactive (19 : Ashley Lantigua, RN)  Comments A: verified by ERIN Guerra RN (19 : Ashley Lantigua, KYRA)

## 2019-06-10 LAB — BACTERIA SPEC AEROBE CULT: ABNORMAL

## 2019-06-12 LAB — EXTERNAL GROUP B STREP ANTIGEN: NEGATIVE

## 2019-06-19 LAB
EXTERNAL CHLAMYDIA SCREEN: NEGATIVE
EXTERNAL GONORRHEA SCREEN: NORMAL

## 2019-06-23 ENCOUNTER — HOSPITAL ENCOUNTER (OUTPATIENT)
Facility: HOSPITAL | Age: 19
Discharge: HOME | End: 2019-06-23
Attending: OBSTETRICS & GYNECOLOGY | Admitting: OBSTETRICS & GYNECOLOGY

## 2019-06-23 VITALS
SYSTOLIC BLOOD PRESSURE: 113 MMHG | HEIGHT: 65 IN | BODY MASS INDEX: 30.66 KG/M2 | TEMPERATURE: 98.5 F | HEART RATE: 88 BPM | DIASTOLIC BLOOD PRESSURE: 52 MMHG | OXYGEN SATURATION: 99 % | WEIGHT: 184 LBS | RESPIRATION RATE: 20 BRPM

## 2019-06-23 PROCEDURE — 59025 FETAL NON-STRESS TEST: CPT

## 2019-06-23 PROCEDURE — G0463 HOSPITAL OUTPT CLINIC VISIT: HCPCS

## 2019-07-02 ENCOUNTER — HOSPITAL ENCOUNTER (OUTPATIENT)
Facility: HOSPITAL | Age: 19
Discharge: HOME | End: 2019-07-02
Attending: OBSTETRICS & GYNECOLOGY | Admitting: OBSTETRICS & GYNECOLOGY

## 2019-07-02 VITALS
SYSTOLIC BLOOD PRESSURE: 123 MMHG | WEIGHT: 186 LBS | HEART RATE: 111 BPM | HEIGHT: 65 IN | BODY MASS INDEX: 30.99 KG/M2 | TEMPERATURE: 97.2 F | DIASTOLIC BLOOD PRESSURE: 71 MMHG

## 2019-07-02 PROCEDURE — 59025 FETAL NON-STRESS TEST: CPT

## 2019-07-02 PROCEDURE — G0463 HOSPITAL OUTPT CLINIC VISIT: HCPCS

## 2019-07-02 NOTE — NON STRESS TEST
Bianca Nieves, a  at 38w6d with an JAME of 7/10/2019, by Last Menstrual Period, was seen at [facility] LABOR DELIVERY for a nonstress test.    Chief Complaint   Patient presents with   • Abdominal Cramping     weakness, nausea, abd. tender. +FM, no lof or bleeding       Patient Active Problem List   Diagnosis   • Right upper quadrant abdominal pain   • Leukocytosis   • Pregnant   • Abdominal pain affecting pregnancy   • Pregnancy   • Abdominal pain during pregnancy       Start Time: 1145  Stop Time: 1245    Interpretation A  Nonstress Test Interpretation A: Reactive (19 1245 : Estrella Durham, RN)  Comments A: verified by (19 1245 : Estrella Durham, KYRA)

## 2019-07-03 ENCOUNTER — HOSPITAL ENCOUNTER (INPATIENT)
Facility: HOSPITAL | Age: 19
LOS: 2 days | Discharge: HOME | End: 2019-07-05
Attending: OBSTETRICS & GYNECOLOGY | Admitting: OBSTETRICS & GYNECOLOGY

## 2019-07-03 ENCOUNTER — APPOINTMENT (OUTPATIENT)
Dept: LABOR AND DELIVERY | Facility: HOSPITAL | Age: 19
End: 2019-07-03

## 2019-07-03 ENCOUNTER — ANESTHESIA EVENT (OUTPATIENT)
Dept: LABOR AND DELIVERY | Facility: HOSPITAL | Age: 19
End: 2019-07-03

## 2019-07-03 ENCOUNTER — ANESTHESIA (OUTPATIENT)
Dept: LABOR AND DELIVERY | Facility: HOSPITAL | Age: 19
End: 2019-07-03

## 2019-07-03 LAB
ABO GROUP BLD: NORMAL
BASOPHILS # BLD AUTO: 0.02 10*3/MM3 (ref 0–0.2)
BASOPHILS NFR BLD AUTO: 0.2 % (ref 0–1.5)
BLD GP AB SCN SERPL QL: POSITIVE
DEPRECATED RDW RBC AUTO: 41.3 FL (ref 37–54)
EOSINOPHIL # BLD AUTO: 0.08 10*3/MM3 (ref 0–0.4)
EOSINOPHIL NFR BLD AUTO: 0.8 % (ref 0.3–6.2)
ERYTHROCYTE [DISTWIDTH] IN BLOOD BY AUTOMATED COUNT: 13.7 % (ref 12.3–15.4)
HCT VFR BLD AUTO: 32.6 % (ref 34–46.6)
HGB BLD-MCNC: 10.7 G/DL (ref 12–15.9)
IMM GRANULOCYTES # BLD AUTO: 0.04 10*3/MM3 (ref 0–0.05)
IMM GRANULOCYTES NFR BLD AUTO: 0.4 % (ref 0–0.5)
LYMPHOCYTES # BLD AUTO: 2.76 10*3/MM3 (ref 0.7–3.1)
LYMPHOCYTES NFR BLD AUTO: 26.9 % (ref 19.6–45.3)
MCH RBC QN AUTO: 26.8 PG (ref 26.6–33)
MCHC RBC AUTO-ENTMCNC: 32.8 G/DL (ref 31.5–35.7)
MCV RBC AUTO: 81.5 FL (ref 79–97)
MONOCYTES # BLD AUTO: 0.84 10*3/MM3 (ref 0.1–0.9)
MONOCYTES NFR BLD AUTO: 8.2 % (ref 5–12)
NEUTROPHILS # BLD AUTO: 6.53 10*3/MM3 (ref 1.7–7)
NEUTROPHILS NFR BLD AUTO: 63.5 % (ref 42.7–76)
PLATELET # BLD AUTO: 179 10*3/MM3 (ref 140–450)
PMV BLD AUTO: 10.8 FL (ref 6–12)
RBC # BLD AUTO: 4 10*6/MM3 (ref 3.77–5.28)
RESIDUAL RHIG DETECTED: NORMAL
RH BLD: NEGATIVE
T&S EXPIRATION DATE: NORMAL
WBC NRBC COR # BLD: 10.27 10*3/MM3 (ref 3.4–10.8)

## 2019-07-03 PROCEDURE — 10907ZC DRAINAGE OF AMNIOTIC FLUID, THERAPEUTIC FROM PRODUCTS OF CONCEPTION, VIA NATURAL OR ARTIFICIAL OPENING: ICD-10-PCS | Performed by: OBSTETRICS & GYNECOLOGY

## 2019-07-03 PROCEDURE — 86870 RBC ANTIBODY IDENTIFICATION: CPT | Performed by: OBSTETRICS & GYNECOLOGY

## 2019-07-03 PROCEDURE — 86850 RBC ANTIBODY SCREEN: CPT | Performed by: OBSTETRICS & GYNECOLOGY

## 2019-07-03 PROCEDURE — 4A1HX4Z MONITORING OF PRODUCTS OF CONCEPTION, CARDIAC ELECTRICAL ACTIVITY, EXTERNAL APPROACH: ICD-10-PCS | Performed by: OBSTETRICS & GYNECOLOGY

## 2019-07-03 PROCEDURE — 86901 BLOOD TYPING SEROLOGIC RH(D): CPT | Performed by: OBSTETRICS & GYNECOLOGY

## 2019-07-03 PROCEDURE — 85025 COMPLETE CBC W/AUTO DIFF WBC: CPT | Performed by: OBSTETRICS & GYNECOLOGY

## 2019-07-03 PROCEDURE — 86900 BLOOD TYPING SEROLOGIC ABO: CPT | Performed by: OBSTETRICS & GYNECOLOGY

## 2019-07-03 PROCEDURE — C1755 CATHETER, INTRASPINAL: HCPCS

## 2019-07-03 PROCEDURE — C1755 CATHETER, INTRASPINAL: HCPCS | Performed by: ANESTHESIOLOGY

## 2019-07-03 PROCEDURE — 59025 FETAL NON-STRESS TEST: CPT

## 2019-07-03 RX ORDER — ONDANSETRON 2 MG/ML
4 INJECTION INTRAMUSCULAR; INTRAVENOUS ONCE AS NEEDED
Status: DISCONTINUED | OUTPATIENT
Start: 2019-07-03 | End: 2019-07-03 | Stop reason: HOSPADM

## 2019-07-03 RX ORDER — OXYTOCIN-SODIUM CHLORIDE 0.9% IV SOLN 30 UNIT/500ML 30-0.9/5 UT/ML-%
2-30 SOLUTION INTRAVENOUS
Status: DISCONTINUED | OUTPATIENT
Start: 2019-07-03 | End: 2019-07-03 | Stop reason: HOSPADM

## 2019-07-03 RX ORDER — EPHEDRINE SULFATE 50 MG/ML
10 INJECTION, SOLUTION INTRAVENOUS
Status: DISCONTINUED | OUTPATIENT
Start: 2019-07-03 | End: 2019-07-03 | Stop reason: HOSPADM

## 2019-07-03 RX ORDER — TERBUTALINE SULFATE 1 MG/ML
0.25 INJECTION, SOLUTION SUBCUTANEOUS AS NEEDED
Status: DISCONTINUED | OUTPATIENT
Start: 2019-07-03 | End: 2019-07-03 | Stop reason: HOSPADM

## 2019-07-03 RX ORDER — METHYLERGONOVINE MALEATE 0.2 MG/ML
200 INJECTION INTRAVENOUS ONCE AS NEEDED
Status: DISCONTINUED | OUTPATIENT
Start: 2019-07-03 | End: 2019-07-03 | Stop reason: HOSPADM

## 2019-07-03 RX ORDER — IBUPROFEN 800 MG/1
800 TABLET ORAL 3 TIMES DAILY
Status: DISCONTINUED | OUTPATIENT
Start: 2019-07-03 | End: 2019-07-05 | Stop reason: HOSPADM

## 2019-07-03 RX ORDER — ONDANSETRON 2 MG/ML
4 INJECTION INTRAMUSCULAR; INTRAVENOUS EVERY 6 HOURS PRN
Status: DISCONTINUED | OUTPATIENT
Start: 2019-07-03 | End: 2019-07-03 | Stop reason: HOSPADM

## 2019-07-03 RX ORDER — BISACODYL 10 MG
10 SUPPOSITORY, RECTAL RECTAL DAILY PRN
Status: DISCONTINUED | OUTPATIENT
Start: 2019-07-04 | End: 2019-07-05 | Stop reason: HOSPADM

## 2019-07-03 RX ORDER — ROPIVACAINE HYDROCHLORIDE 2 MG/ML
INJECTION, SOLUTION EPIDURAL; INFILTRATION; PERINEURAL
Status: DISCONTINUED
Start: 2019-07-03 | End: 2019-07-05 | Stop reason: HOSPADM

## 2019-07-03 RX ORDER — DOCUSATE SODIUM 100 MG/1
100 CAPSULE, LIQUID FILLED ORAL DAILY
Status: DISCONTINUED | OUTPATIENT
Start: 2019-07-03 | End: 2019-07-05 | Stop reason: HOSPADM

## 2019-07-03 RX ORDER — MINERAL OIL 471.99 G/472ML
1 OIL TOPICAL AS NEEDED
Status: DISCONTINUED | OUTPATIENT
Start: 2019-07-03 | End: 2019-07-03 | Stop reason: HOSPADM

## 2019-07-03 RX ORDER — MISOPROSTOL 100 UG/1
600 TABLET ORAL ONCE AS NEEDED
Status: DISCONTINUED | OUTPATIENT
Start: 2019-07-03 | End: 2019-07-05 | Stop reason: HOSPADM

## 2019-07-03 RX ORDER — ACETAMINOPHEN 325 MG/1
650 TABLET ORAL EVERY 4 HOURS PRN
Status: DISCONTINUED | OUTPATIENT
Start: 2019-07-03 | End: 2019-07-03 | Stop reason: HOSPADM

## 2019-07-03 RX ORDER — OXYTOCIN-SODIUM CHLORIDE 0.9% IV SOLN 30 UNIT/500ML 30-0.9/5 UT/ML-%
999 SOLUTION INTRAVENOUS ONCE
Status: DISCONTINUED | OUTPATIENT
Start: 2019-07-03 | End: 2019-07-03 | Stop reason: HOSPADM

## 2019-07-03 RX ORDER — LANOLIN 100 %
OINTMENT (GRAM) TOPICAL
Status: DISCONTINUED | OUTPATIENT
Start: 2019-07-03 | End: 2019-07-05 | Stop reason: HOSPADM

## 2019-07-03 RX ORDER — MAGNESIUM HYDROXIDE 1200 MG/15ML
3000 LIQUID ORAL ONCE AS NEEDED
Status: DISCONTINUED | OUTPATIENT
Start: 2019-07-03 | End: 2019-07-03 | Stop reason: HOSPADM

## 2019-07-03 RX ORDER — ONDANSETRON 4 MG/1
4 TABLET, FILM COATED ORAL EVERY 6 HOURS PRN
Status: DISCONTINUED | OUTPATIENT
Start: 2019-07-03 | End: 2019-07-03 | Stop reason: HOSPADM

## 2019-07-03 RX ORDER — FAMOTIDINE 10 MG/ML
20 INJECTION, SOLUTION INTRAVENOUS ONCE AS NEEDED
Status: DISCONTINUED | OUTPATIENT
Start: 2019-07-03 | End: 2019-07-03 | Stop reason: HOSPADM

## 2019-07-03 RX ORDER — SODIUM CHLORIDE, SODIUM LACTATE, POTASSIUM CHLORIDE, CALCIUM CHLORIDE 600; 310; 30; 20 MG/100ML; MG/100ML; MG/100ML; MG/100ML
125 INJECTION, SOLUTION INTRAVENOUS CONTINUOUS
Status: DISCONTINUED | OUTPATIENT
Start: 2019-07-03 | End: 2019-07-05

## 2019-07-03 RX ORDER — IBUPROFEN 800 MG/1
800 TABLET ORAL EVERY 8 HOURS SCHEDULED
Status: DISCONTINUED | OUTPATIENT
Start: 2019-07-03 | End: 2019-07-03 | Stop reason: HOSPADM

## 2019-07-03 RX ORDER — ONDANSETRON 4 MG/1
4 TABLET, FILM COATED ORAL EVERY 8 HOURS PRN
Status: DISCONTINUED | OUTPATIENT
Start: 2019-07-03 | End: 2019-07-05 | Stop reason: HOSPADM

## 2019-07-03 RX ORDER — OXYTOCIN-SODIUM CHLORIDE 0.9% IV SOLN 30 UNIT/500ML 30-0.9/5 UT/ML-%
125 SOLUTION INTRAVENOUS CONTINUOUS PRN
Status: DISCONTINUED | OUTPATIENT
Start: 2019-07-03 | End: 2019-07-03 | Stop reason: HOSPADM

## 2019-07-03 RX ORDER — SODIUM CHLORIDE 0.9 % (FLUSH) 0.9 %
1-10 SYRINGE (ML) INJECTION AS NEEDED
Status: DISCONTINUED | OUTPATIENT
Start: 2019-07-03 | End: 2019-07-05 | Stop reason: HOSPADM

## 2019-07-03 RX ORDER — CARBOPROST TROMETHAMINE 250 UG/ML
250 INJECTION, SOLUTION INTRAMUSCULAR AS NEEDED
Status: DISCONTINUED | OUTPATIENT
Start: 2019-07-03 | End: 2019-07-03 | Stop reason: HOSPADM

## 2019-07-03 RX ORDER — SODIUM CHLORIDE 0.9 % (FLUSH) 0.9 %
3-10 SYRINGE (ML) INJECTION AS NEEDED
Status: DISCONTINUED | OUTPATIENT
Start: 2019-07-03 | End: 2019-07-03 | Stop reason: HOSPADM

## 2019-07-03 RX ORDER — MISOPROSTOL 100 UG/1
600 TABLET ORAL AS NEEDED
Status: DISCONTINUED | OUTPATIENT
Start: 2019-07-03 | End: 2019-07-03 | Stop reason: HOSPADM

## 2019-07-03 RX ORDER — BUPIVACAINE HYDROCHLORIDE 2.5 MG/ML
INJECTION, SOLUTION EPIDURAL; INFILTRATION; INTRACAUDAL
Status: DISCONTINUED
Start: 2019-07-03 | End: 2019-07-05 | Stop reason: HOSPADM

## 2019-07-03 RX ORDER — PRENATAL VIT/IRON FUM/FOLIC AC 27MG-0.8MG
1 TABLET ORAL DAILY
Status: DISCONTINUED | OUTPATIENT
Start: 2019-07-04 | End: 2019-07-05

## 2019-07-03 RX ORDER — HYDROCODONE BITARTRATE AND ACETAMINOPHEN 5; 325 MG/1; MG/1
1 TABLET ORAL EVERY 4 HOURS PRN
Status: DISCONTINUED | OUTPATIENT
Start: 2019-07-03 | End: 2019-07-05 | Stop reason: HOSPADM

## 2019-07-03 RX ORDER — OXYTOCIN-SODIUM CHLORIDE 0.9% IV SOLN 30 UNIT/500ML 30-0.9/5 UT/ML-%
250 SOLUTION INTRAVENOUS CONTINUOUS
Status: ACTIVE | OUTPATIENT
Start: 2019-07-03 | End: 2019-07-03

## 2019-07-03 RX ORDER — BUTORPHANOL TARTRATE 1 MG/ML
1 INJECTION, SOLUTION INTRAMUSCULAR; INTRAVENOUS
Status: DISCONTINUED | OUTPATIENT
Start: 2019-07-03 | End: 2019-07-03 | Stop reason: HOSPADM

## 2019-07-03 RX ORDER — ONDANSETRON 2 MG/ML
4 INJECTION INTRAMUSCULAR; INTRAVENOUS EVERY 6 HOURS PRN
Status: DISCONTINUED | OUTPATIENT
Start: 2019-07-03 | End: 2019-07-05 | Stop reason: HOSPADM

## 2019-07-03 RX ORDER — ROPIVACAINE HYDROCHLORIDE 2 MG/ML
14 INJECTION, SOLUTION EPIDURAL; INFILTRATION; PERINEURAL CONTINUOUS
Status: DISCONTINUED | OUTPATIENT
Start: 2019-07-03 | End: 2019-07-05

## 2019-07-03 RX ADMIN — WITCH HAZEL 1 PAD: 500 SOLUTION RECTAL; TOPICAL at 20:08

## 2019-07-03 RX ADMIN — SODIUM CHLORIDE, POTASSIUM CHLORIDE, SODIUM LACTATE AND CALCIUM CHLORIDE 125 ML/HR: 600; 310; 30; 20 INJECTION, SOLUTION INTRAVENOUS at 08:52

## 2019-07-03 RX ADMIN — OXYTOCIN 2 MILLI-UNITS/MIN: 10 INJECTION INTRAVENOUS at 08:09

## 2019-07-03 RX ADMIN — Medication: at 16:41

## 2019-07-03 RX ADMIN — IBUPROFEN 800 MG: 800 TABLET, FILM COATED ORAL at 16:39

## 2019-07-03 RX ADMIN — DOCUSATE SODIUM 100 MG: 100 CAPSULE ORAL at 16:39

## 2019-07-03 RX ADMIN — SODIUM CHLORIDE, POTASSIUM CHLORIDE, SODIUM LACTATE AND CALCIUM CHLORIDE 125 ML/HR: 600; 310; 30; 20 INJECTION, SOLUTION INTRAVENOUS at 07:36

## 2019-07-03 RX ADMIN — HYDROCODONE BITARTRATE AND ACETAMINOPHEN 1 TABLET: 5; 325 TABLET ORAL at 20:08

## 2019-07-03 NOTE — PLAN OF CARE
Problem: Patient Care Overview  Goal: Plan of Care Review  Outcome: Ongoing (interventions implemented as appropriate)   07/03/19 0702   Coping/Psychosocial   Plan of Care Reviewed With patient     Goal: Individualization and Mutuality  Outcome: Ongoing (interventions implemented as appropriate)    Goal: Discharge Needs Assessment  Outcome: Ongoing (interventions implemented as appropriate)    Goal: Interprofessional Rounds/Family Conf  Outcome: Ongoing (interventions implemented as appropriate)      Problem: Labor (Cervical Ripen, Induct, Augment) (Adult,Obstetrics,Pediatric)  Goal: Signs and Symptoms of Listed Potential Problems Will be Absent, Minimized or Managed (Labor)  Outcome: Ongoing (interventions implemented as appropriate)

## 2019-07-03 NOTE — ANESTHESIA PROCEDURE NOTES
Labor Epidural    Pre-sedation assessment completed: 7/3/2019 8:44 AM    Patient location during procedure: OB  Start Time: 7/3/2019 8:44 AM  Stop Time: 7/3/2019 8:50 AM  Indication:at surgeon's request  Performed By  Anesthesiologist: Jorge Melgar MD  Preanesthetic Checklist  Completed: patient identified, site marked, surgical consent, pre-op evaluation, timeout performed, IV checked, risks and benefits discussed and monitors and equipment checked  Prep:  Pt Position:sitting  Sterile Tech:gloves, mask, sterile barrier and cap  Prep:povidone-iodine 7.5% surgical scrub  Monitoring:blood pressure monitoring  Epidural Block Procedure:  Approach:midline  Guidance:landmark technique and palpation technique  Location:L3-L4  Needle Type:Tuohy  Needle Gauge:17 G  Loss of Resistance: 6cm  Cath Depth at skin:8 cm  Paresthesia: none  Aspiration:negative  Test Dose:negative  Number of Attempts: 1  Post Assessment:  Dressing:occlusive dressing applied and secured with tape  Pt Tolerance:patient tolerated the procedure well with no apparent complications  Complications:no

## 2019-07-03 NOTE — L&D DELIVERY NOTE
Vaginal Delivery Procedure Note    Bianca Nieves  Gestational Age: <None>         OBGYN: Juan Pablo Wood MD      Pre-op Diagnosis: Complete Dilation      Anesthesia: Epidual        Detailed Description of Procedure   This patient came to the hospital this morning for induction and delivery.  She delivered a healthy male child over a midline episiotomy.  She was having some bradycardia near the end of her pushing, so I helped her out with with 1 very gentle pull with a vacuum.  Father, mother, and baby are all stable in the birthing room at this time.  Episiotomy was sutured in the usual fashion with 2-0 chromic.             Disposition: Transfer to [facility]  Condition: Stable    Juan Pablo Wood M.D     Date: 7/3/2019  Time: 10:53 AM    Bianca Nieves September 3, 2000

## 2019-07-03 NOTE — NON STRESS TEST
Bianca Nieves, a  at 39w0d with an JAME of 7/10/2019, by Last Menstrual Period, was seen at [facility] LABOR DELIVERY for a nonstress test.    Chief Complaint   Patient presents with   • Scheduled Induction     term induction. denies lof/bleeding/pain.        Patient Active Problem List   Diagnosis   • Right upper quadrant abdominal pain   • Leukocytosis   • Pregnant   • Abdominal pain affecting pregnancy   • Pregnancy   • Abdominal pain during pregnancy       Start Time: 645  Stop Time: 710  FETAL NONSTRESS TEST REPORT      Gestational age: As recorded in hospital chart    Indication: See hospital chart    Accelerations: Present    Baseline fetal heart rate: 130    Decelerations: Few variables present    Conclusion: Reactive

## 2019-07-03 NOTE — PLAN OF CARE
Problem: Postpartum (Vaginal Delivery) (Adult,Obstetrics,Pediatric)  Goal: Signs and Symptoms of Listed Potential Problems Will be Absent, Minimized or Managed (Postpartum)   07/03/19 1450   Goal/Outcome Evaluation   Problems Assessed (Postpartum Vaginal Delivery) all   Problems Present (Postpartum Vag Deliv) none

## 2019-07-03 NOTE — NON STRESS TEST
Bianca Nieves, a  at 39w0d with an JAME of 7/10/2019, by Last Menstrual Period, was seen at [facility] LABOR DELIVERY for a nonstress test.    Chief Complaint   Patient presents with   • Scheduled Induction     term induction. denies lof/bleeding/pain.        Patient Active Problem List   Diagnosis   • Right upper quadrant abdominal pain   • Leukocytosis   • Pregnant   • Abdominal pain affecting pregnancy   • Pregnancy   • Abdominal pain during pregnancy       Start Time: 620  Stop Time: 40    Interpretation A  Nonstress Test Interpretation A: Reactive (1940 : Ashley Lantigua, RN)  Comments A: verified by SUAD Herrera RN (19 : Ashley Lantigua, RN)    FETAL NONSTRESS TEST REPORT      Gestational age: As recorded in hospital chart    Indication: See hospital chart    Accelerations: Present    Baseline fetal heart rate: 135    Decelerations: Few variables present    Conclusion: Reactive

## 2019-07-03 NOTE — H&P
HISTORY    Chief complaint  Term pregnancy for induction    History of present illness  This is a 26-year old G2, P0 patient at 39 weeks who was admitted for induction and delivery at term.    Past medical, surgical, obstetrical history  See attached prenatal record    Family history  See attached prenatal record    Social history  See attached prenatal record    Functional inquiry  See attached prenatal record                                                   PHYSICAL EXAMINATION    General  In no acute distress    HEENT  Throat and ears are clear, no masses or nodes were palpable    CVR  Heart sounds are normal with no murmurs, chest is clear to IPPA    GI/  Abdomen is soft with no masses tenderness or organomegaly.  Uterine size is commensurate with her dates.  Cervix is as noted elsewhere in chart    MS  No gross bone or joint abnormalities.                                                                    IMPRESSION    Term pregnancy for induction and delivery                                                                TREATMENT PLAN    I ruptured her membranes for clear fluid.  She is 2-3, 50%, -2.  We have Pitocin running.  Expect spontaneous vaginal delivery.  Bianca Nieves September 3, 2000

## 2019-07-03 NOTE — ANESTHESIA PREPROCEDURE EVALUATION
Anesthesia Evaluation     Patient summary reviewed and Nursing notes reviewed   NPO Solid Status: > 8 hours  NPO Liquid Status: > 8 hours           Airway   Mallampati: I  TM distance: >3 FB  Neck ROM: full  No difficulty expected  Dental - normal exam     Pulmonary - negative pulmonary ROS and normal exam   Cardiovascular - negative cardio ROS and normal exam  Exercise tolerance: good (4-7 METS)    NYHA Classification: II        Neuro/Psych- negative ROS  GI/Hepatic/Renal/Endo - negative ROS     Musculoskeletal (-) negative ROS    Abdominal  - normal exam    Bowel sounds: normal.   Substance History - negative use     OB/GYN negative ob/gyn ROS         Other - negative ROS                       Anesthesia Plan    ASA 2     epidural     Anesthetic plan, all risks, benefits, and alternatives have been provided, discussed and informed consent has been obtained with: patient.

## 2019-07-03 NOTE — ANESTHESIA POSTPROCEDURE EVALUATION
Patient: Bianca Nieves    Procedure Summary     Date:  07/03/19 Room / Location:      Anesthesia Start:  0844 Anesthesia Stop:  1040    Procedure:  LABOR ANALGESIA Diagnosis:      Scheduled Providers:   Provider:      Anesthesia Type:  epidural ASA Status:  2          Anesthesia Type: epidural  Last vitals  BP   123/72 (07/03/19 1002)   Temp   98.1 °F (36.7 °C) (07/03/19 0922)   Pulse   75 (07/03/19 1002)   Resp   18 (07/03/19 0922)     SpO2   98 % (07/03/19 0922)     Post Anesthesia Care and Evaluation    Patient location during evaluation: PHASE II  Patient participation: complete - patient participated  Level of consciousness: awake and alert  Pain score: 1  Pain management: adequate  Airway patency: patent  Anesthetic complications: No anesthetic complications  PONV Status: controlled  Cardiovascular status: acceptable  Respiratory status: acceptable  Hydration status: acceptable  Post Neuraxial Block status: Motor and sensory function returned to baseline and No signs or symptoms of PDPH

## 2019-07-04 LAB
BASOPHILS # BLD AUTO: 0.02 10*3/MM3 (ref 0–0.2)
BASOPHILS NFR BLD AUTO: 0.2 % (ref 0–1.5)
DEPRECATED RDW RBC AUTO: 40.7 FL (ref 37–54)
EOSINOPHIL # BLD AUTO: 0.19 10*3/MM3 (ref 0–0.4)
EOSINOPHIL NFR BLD AUTO: 1.7 % (ref 0.3–6.2)
ERYTHROCYTE [DISTWIDTH] IN BLOOD BY AUTOMATED COUNT: 13.7 % (ref 12.3–15.4)
HCT VFR BLD AUTO: 32 % (ref 34–46.6)
HGB BLD-MCNC: 9.9 G/DL (ref 12–15.9)
IMM GRANULOCYTES # BLD AUTO: 0.03 10*3/MM3 (ref 0–0.05)
IMM GRANULOCYTES NFR BLD AUTO: 0.3 % (ref 0–0.5)
LYMPHOCYTES # BLD AUTO: 3.35 10*3/MM3 (ref 0.7–3.1)
LYMPHOCYTES NFR BLD AUTO: 30 % (ref 19.6–45.3)
MCH RBC QN AUTO: 26.3 PG (ref 26.6–33)
MCHC RBC AUTO-ENTMCNC: 30.9 G/DL (ref 31.5–35.7)
MCV RBC AUTO: 84.9 FL (ref 79–97)
MONOCYTES # BLD AUTO: 0.6 10*3/MM3 (ref 0.1–0.9)
MONOCYTES NFR BLD AUTO: 5.4 % (ref 5–12)
NEUTROPHILS # BLD AUTO: 6.98 10*3/MM3 (ref 1.7–7)
NEUTROPHILS NFR BLD AUTO: 62.4 % (ref 42.7–76)
PLATELET # BLD AUTO: 146 10*3/MM3 (ref 140–450)
PMV BLD AUTO: 10.8 FL (ref 6–12)
RBC # BLD AUTO: 3.77 10*6/MM3 (ref 3.77–5.28)
WBC NRBC COR # BLD: 11.17 10*3/MM3 (ref 3.4–10.8)

## 2019-07-04 PROCEDURE — 85025 COMPLETE CBC W/AUTO DIFF WBC: CPT | Performed by: OBSTETRICS & GYNECOLOGY

## 2019-07-04 RX ADMIN — IBUPROFEN 800 MG: 800 TABLET, FILM COATED ORAL at 08:34

## 2019-07-04 RX ADMIN — IBUPROFEN 800 MG: 800 TABLET, FILM COATED ORAL at 15:01

## 2019-07-04 RX ADMIN — IBUPROFEN 800 MG: 800 TABLET, FILM COATED ORAL at 21:48

## 2019-07-04 RX ADMIN — PRENATAL VIT W/ FE FUMARATE-FA TAB 27-0.8 MG 1 TABLET: 27-0.8 TAB at 08:34

## 2019-07-04 RX ADMIN — HYDROCODONE BITARTRATE AND ACETAMINOPHEN 1 TABLET: 5; 325 TABLET ORAL at 20:49

## 2019-07-04 RX ADMIN — DOCUSATE SODIUM 100 MG: 100 CAPSULE ORAL at 08:34

## 2019-07-04 RX ADMIN — Medication 10 MG: at 20:49

## 2019-07-04 NOTE — PROGRESS NOTES
Spontaneous Vaginal Delivery Progress Note      Hospital Course: G 2, now . Patient was admitted on 7/3/2019  6:01 AM with IUP at 39w0d weeks gestation secondary to Pregnant [Z34.90]. Patient underwent a normal spontaneous vaginal delivery.       Patient stable. No complaints.     signs in last 24 hours:    Vital Signs Range for the last 24 hours              Temp:  [97.7 °F (36.5 °C)-98.2 °F (36.8 °C)] 97.7 °F (36.5 °C)  Heart Rate:  [64-91] 64  Resp:  [18-20] 18  BP: (106-123)/(56-72) 106/68     Radiology     Imaging Results (last 24 hours)     ** No results found for the last 24 hours. **           Labs     Lab Results (last 24 hours)     Procedure Component Value Units Date/Time    CBC & Differential [[id number]] Collected:  07/04/19 0636    Specimen:  Blood Updated:  07/04/19 0659    Narrative:       The following orders were created for panel order CBC & Differential.  Procedure                               Abnormality         Status                     ---------                               -----------         ------                     CBC Auto Differential[[id number]]        Abnormal            Final result                 Please view results for these tests on the individual orders.    CBC Auto Differential [[id number]]  (Abnormal) Collected:  07/04/19 0636    Specimen:  Blood Updated:  07/04/19 0659     WBC 11.17 10*3/mm3      RBC 3.77 10*6/mm3      Hemoglobin 9.9 g/dL      Hematocrit 32.0 %      MCV 84.9 fL      MCH 26.3 pg      MCHC 30.9 g/dL      RDW 13.7 %      RDW-SD 40.7 fl      MPV 10.8 fL      Platelets 146 10*3/mm3      Neutrophil % 62.4 %      Lymphocyte % 30.0 %      Monocyte % 5.4 %      Eosinophil % 1.7 %      Basophil % 0.2 %      Immature Grans % 0.3 %      Neutrophils, Absolute 6.98 10*3/mm3      Lymphocytes, Absolute 3.35 10*3/mm3      Monocytes, Absolute 0.60 10*3/mm3      Eosinophils, Absolute 0.19 10*3/mm3      Basophils, Absolute 0.02 10*3/mm3      Immature Grans, Absolute 0.03  10*3/mm3             Review of Systems    The following systems were reviewed and negative;  ENT, respiratory, cardiovascular, gastrointestinal, genitourinary, breast, endocrine and allergies / immunologic.      Physical Exam:      General Appearance:    Alert, cooperative, in no acute distress   Head:    Normocephalic, without obvious abnormality, atraumatic   Eyes:            Lids and lashes normal, conjunctivae and sclerae normal, no   icterus, no pallor, corneas clear, PERRLA   Ears:    Ears appear intact with no abnormalities noted   Throat:   No oral lesions, no thrush, oral mucosa moist   Neck:   No adenopathy, supple, trachea midline, no thyromegaly, no     carotid bruit, no JVD   Back:     No kyphosis present, no scoliosis present, no skin lesions,       erythema or scars, no tenderness to percussion or                   palpation,   range of motion normal   Lungs:     Clear to auscultation,respirations regular, even and                   unlabored    Heart:    Regular rhythm and normal rate, normal S1 and S2, no            murmur, no gallop, no rub, no click   Breast Exam:    Deferred   Abdomen:     Normal bowel sounds, no masses, no organomegaly, soft        non-tender, non-distended, no guarding, no rebound                 tenderness   Genitalia:    Deferred   Extremities:   Moves all extremities well, no edema, no cyanosis, no              redness   Pulses:   Pulses palpable and equal bilaterally   Skin:   No bleeding, bruising or rash   Lymph nodes:   No palpable adenopathy   Neurologic:   Cranial nerves 2 - 12 grossly intact, sensation intact, DTR        present and equal bilaterally                 Assessment:  1.  . PPD#1. Patient doing well. No complaints.     Plan:  1. Will continue current plan of care and anticipate discharge to home in the AM.      Tu Richard III, MD  19  10:49 AM

## 2019-07-04 NOTE — PLAN OF CARE
Problem: Postpartum (Vaginal Delivery) (Adult,Obstetrics,Pediatric)  Goal: Signs and Symptoms of Listed Potential Problems Will be Absent, Minimized or Managed (Postpartum)   07/04/19 0940   Goal/Outcome Evaluation   Problems Assessed (Postpartum Vaginal Delivery) all   Problems Present (Postpartum Vag Deliv) none

## 2019-07-05 VITALS
DIASTOLIC BLOOD PRESSURE: 55 MMHG | TEMPERATURE: 97.9 F | OXYGEN SATURATION: 96 % | BODY MASS INDEX: 30.99 KG/M2 | SYSTOLIC BLOOD PRESSURE: 117 MMHG | RESPIRATION RATE: 18 BRPM | HEART RATE: 71 BPM | WEIGHT: 186 LBS | HEIGHT: 65 IN

## 2019-07-05 PROBLEM — Z34.90 PREGNANT: Status: RESOLVED | Noted: 2017-05-23 | Resolved: 2019-07-05

## 2019-07-05 RX ORDER — IBUPROFEN 800 MG/1
800 TABLET ORAL 3 TIMES DAILY
Qty: 30 TABLET | Refills: 0 | OUTPATIENT
Start: 2019-07-05 | End: 2020-02-13 | Stop reason: HOSPADM

## 2019-07-05 RX ADMIN — IBUPROFEN 800 MG: 800 TABLET, FILM COATED ORAL at 07:51

## 2019-07-05 RX ADMIN — DOCUSATE SODIUM 100 MG: 100 CAPSULE ORAL at 07:51

## 2019-07-05 RX ADMIN — PRENATAL VIT W/ FE FUMARATE-FA TAB 27-0.8 MG 1 TABLET: 27-0.8 TAB at 07:51

## 2019-07-05 NOTE — DISCHARGE SUMMARY
Discharge Summary: Vaginal Delivery     Admit Date: 7/3/2019  6:01 AM    Admit Diagnosis: Pregnant [Z34.90]    Date of Discharge:  2019    Discharge Diagnosis: Same        Hospital Course  Patient is a 18 y.o. female, G2, now P 1102. S/P . PPD#2. Patient doing well. No complaints. Patient tolerating a regular diet and ambulating without difficulty. Will discharge to home today.     Procedures Performed  Normal Spontaneous Vaginal Delivery         Vital Signs  Temp:  [98.1 °F (36.7 °C)] 98.1 °F (36.7 °C)  Heart Rate:  [84] 84  Resp:  [18] 18  BP: (118)/(70) 118/70    Review of Systems    The following systems were reviewed and negative;  ENT, respiratory, cardiovascular, gastrointestinal, genitourinary, breast, endocrine and allergies / immunologic.      Physical Exam:      General Appearance:    Alert, cooperative, in no acute distress   Head:    Normocephalic, without obvious abnormality, atraumatic   Eyes:            Lids and lashes normal, conjunctivae and sclerae normal, no   icterus, no pallor, corneas clear, PERRLA   Ears:    Ears appear intact with no abnormalities noted   Throat:   No oral lesions, no thrush, oral mucosa moist   Neck:   No adenopathy, supple, trachea midline, no thyromegaly, no     carotid bruit, no JVD   Back:     No kyphosis present, no scoliosis present, no skin lesions,       erythema or scars, no tenderness to percussion or                   palpation,   range of motion normal   Lungs:     Clear to auscultation,respirations regular, even and                   unlabored    Heart:    Regular rhythm and normal rate, normal S1 and S2, no            murmur, no gallop, no rub, no click   Breast Exam:    Deferred   Abdomen:     Normal bowel sounds, no masses, no organomegaly, soft        non-tender, non-distended, no guarding, no rebound                 tenderness   Genitalia:    Deferred   Extremities:   Moves all extremities well, no edema, no cyanosis, no              redness    Pulses:   Pulses palpable and equal bilaterally   Skin:   No bleeding, bruising or rash   Lymph nodes:   No palpable adenopathy   Neurologic:   Cranial nerves 2 - 12 grossly intact, sensation intact, DTR        present and equal bilaterally             Condition on Discharge:  Stable    Urine Output Good    Discharge Diet: Regular    Discharge Medications  Motrin 800 mg q 6 #30    Activity at Discharge: No driving x 2 weeks. Nothing per vagina until cleared by a physician. Patient expected to return to work or school in 6 weeks.     Follow-up Appointments  Patient will follow up with Dr. Wood in 2 weeks.        Tu Richard MD.   07/05/19  7:50 AM

## 2019-07-05 NOTE — PLAN OF CARE
Problem: Patient Care Overview  Goal: Plan of Care Review  Outcome: Ongoing (interventions implemented as appropriate)   07/05/19 0142   Coping/Psychosocial   Plan of Care Reviewed With patient   Plan of Care Review   Progress improving   OTHER   Outcome Summary will dc home stable and able to care for self and infant     Goal: Individualization and Mutuality  Outcome: Ongoing (interventions implemented as appropriate)    Goal: Discharge Needs Assessment  Outcome: Ongoing (interventions implemented as appropriate)    Goal: Interprofessional Rounds/Family Conf  Outcome: Ongoing (interventions implemented as appropriate)      Problem: Postpartum (Vaginal Delivery) (Adult,Obstetrics,Pediatric)  Goal: Signs and Symptoms of Listed Potential Problems Will be Absent, Minimized or Managed (Postpartum)  Outcome: Ongoing (interventions implemented as appropriate)

## 2020-01-23 ENCOUNTER — OFFICE VISIT (OUTPATIENT)
Dept: SURGERY | Facility: CLINIC | Age: 20
End: 2020-01-23

## 2020-01-23 VITALS — WEIGHT: 186 LBS | BODY MASS INDEX: 30.99 KG/M2 | HEIGHT: 65 IN

## 2020-01-23 DIAGNOSIS — L72.3 SEBACEOUS CYST: Primary | ICD-10-CM

## 2020-01-23 PROCEDURE — 99203 OFFICE O/P NEW LOW 30 MIN: CPT | Performed by: SURGERY

## 2020-01-23 RX ORDER — HYDROXYZINE PAMOATE 25 MG/1
CAPSULE ORAL
COMMUNITY
Start: 2020-01-20 | End: 2020-12-03 | Stop reason: SDUPTHER

## 2020-01-23 RX ORDER — ESCITALOPRAM OXALATE 10 MG/1
TABLET ORAL
COMMUNITY
Start: 2020-01-06 | End: 2020-06-10

## 2020-01-23 RX ORDER — DESOGESTREL AND ETHINYL ESTRADIOL AND ETHINYL ESTRADIOL 21-5 (28)
KIT ORAL
COMMUNITY
Start: 2020-01-06 | End: 2020-07-07

## 2020-02-13 ENCOUNTER — APPOINTMENT (OUTPATIENT)
Dept: CT IMAGING | Facility: HOSPITAL | Age: 20
End: 2020-02-13

## 2020-02-13 ENCOUNTER — HOSPITAL ENCOUNTER (EMERGENCY)
Facility: HOSPITAL | Age: 20
Discharge: HOME | End: 2020-02-13
Attending: EMERGENCY MEDICINE | Admitting: EMERGENCY MEDICINE

## 2020-02-13 ENCOUNTER — APPOINTMENT (OUTPATIENT)
Dept: GENERAL RADIOLOGY | Facility: HOSPITAL | Age: 20
End: 2020-02-13

## 2020-02-13 VITALS
BODY MASS INDEX: 24.11 KG/M2 | HEART RATE: 98 BPM | WEIGHT: 150 LBS | RESPIRATION RATE: 18 BRPM | HEIGHT: 66 IN | TEMPERATURE: 98.9 F | OXYGEN SATURATION: 100 % | DIASTOLIC BLOOD PRESSURE: 56 MMHG | SYSTOLIC BLOOD PRESSURE: 122 MMHG

## 2020-02-13 DIAGNOSIS — N39.0 ACUTE UTI: Primary | ICD-10-CM

## 2020-02-13 LAB
ALBUMIN SERPL-MCNC: 4.16 G/DL (ref 3.5–5.2)
ALBUMIN/GLOB SERPL: 1 G/DL
ALP SERPL-CCNC: 91 U/L (ref 39–117)
ALT SERPL W P-5'-P-CCNC: 15 U/L (ref 1–33)
AMYLASE SERPL-CCNC: 31 U/L (ref 28–100)
ANION GAP SERPL CALCULATED.3IONS-SCNC: 13.8 MMOL/L (ref 5–15)
AST SERPL-CCNC: 15 U/L (ref 1–32)
BACTERIA UR QL AUTO: ABNORMAL /HPF
BASOPHILS # BLD AUTO: 0.05 10*3/MM3 (ref 0–0.2)
BASOPHILS NFR BLD AUTO: 0.3 % (ref 0–1.5)
BILIRUB SERPL-MCNC: 0.5 MG/DL (ref 0.2–1.2)
BILIRUB UR QL STRIP: NEGATIVE
BUN BLD-MCNC: 9 MG/DL (ref 6–20)
BUN/CREAT SERPL: 11.3 (ref 7–25)
CALCIUM SPEC-SCNC: 9.2 MG/DL (ref 8.6–10.5)
CHLORIDE SERPL-SCNC: 103 MMOL/L (ref 98–107)
CLARITY UR: ABNORMAL
CO2 SERPL-SCNC: 24.2 MMOL/L (ref 22–29)
COLOR UR: YELLOW
CREAT BLD-MCNC: 0.8 MG/DL (ref 0.57–1)
CRP SERPL-MCNC: 20.41 MG/DL (ref 0–0.5)
D-LACTATE SERPL-SCNC: 1.4 MMOL/L (ref 0.5–2)
DEPRECATED RDW RBC AUTO: 39.3 FL (ref 37–54)
EOSINOPHIL # BLD AUTO: 0.07 10*3/MM3 (ref 0–0.4)
EOSINOPHIL NFR BLD AUTO: 0.4 % (ref 0.3–6.2)
ERYTHROCYTE [DISTWIDTH] IN BLOOD BY AUTOMATED COUNT: 12.5 % (ref 12.3–15.4)
GFR SERPL CREATININE-BSD FRML MDRD: 91 ML/MIN/1.73
GLOBULIN UR ELPH-MCNC: 4 GM/DL
GLUCOSE BLD-MCNC: 109 MG/DL (ref 65–99)
GLUCOSE UR STRIP-MCNC: NEGATIVE MG/DL
HCG SERPL QL: NEGATIVE
HCT VFR BLD AUTO: 41.1 % (ref 34–46.6)
HGB BLD-MCNC: 13.2 G/DL (ref 12–15.9)
HGB UR QL STRIP.AUTO: NEGATIVE
HYALINE CASTS UR QL AUTO: ABNORMAL /LPF
IMM GRANULOCYTES # BLD AUTO: 0.05 10*3/MM3 (ref 0–0.05)
IMM GRANULOCYTES NFR BLD AUTO: 0.3 % (ref 0–0.5)
KETONES UR QL STRIP: NEGATIVE
LEUKOCYTE ESTERASE UR QL STRIP.AUTO: ABNORMAL
LIPASE SERPL-CCNC: 15 U/L (ref 13–60)
LYMPHOCYTES # BLD AUTO: 3.03 10*3/MM3 (ref 0.7–3.1)
LYMPHOCYTES NFR BLD AUTO: 19.3 % (ref 19.6–45.3)
MCH RBC QN AUTO: 27.6 PG (ref 26.6–33)
MCHC RBC AUTO-ENTMCNC: 32.1 G/DL (ref 31.5–35.7)
MCV RBC AUTO: 86 FL (ref 79–97)
MONOCYTES # BLD AUTO: 0.69 10*3/MM3 (ref 0.1–0.9)
MONOCYTES NFR BLD AUTO: 4.4 % (ref 5–12)
NEUTROPHILS # BLD AUTO: 11.84 10*3/MM3 (ref 1.7–7)
NEUTROPHILS NFR BLD AUTO: 75.3 % (ref 42.7–76)
NITRITE UR QL STRIP: POSITIVE
NRBC BLD AUTO-RTO: 0 /100 WBC (ref 0–0.2)
PH UR STRIP.AUTO: 6.5 [PH] (ref 5–8)
PLATELET # BLD AUTO: 234 10*3/MM3 (ref 140–450)
PMV BLD AUTO: 10 FL (ref 6–12)
POTASSIUM BLD-SCNC: 3.7 MMOL/L (ref 3.5–5.2)
PROT SERPL-MCNC: 8.2 G/DL (ref 6–8.5)
PROT UR QL STRIP: ABNORMAL
RBC # BLD AUTO: 4.78 10*6/MM3 (ref 3.77–5.28)
RBC # UR: ABNORMAL /HPF
REF LAB TEST METHOD: ABNORMAL
SODIUM BLD-SCNC: 141 MMOL/L (ref 136–145)
SP GR UR STRIP: >1.03 (ref 1–1.03)
SQUAMOUS #/AREA URNS HPF: ABNORMAL /HPF
UROBILINOGEN UR QL STRIP: ABNORMAL
WBC NRBC COR # BLD: 15.73 10*3/MM3 (ref 3.4–10.8)
WBC UR QL AUTO: ABNORMAL /HPF

## 2020-02-13 PROCEDURE — 25010000002 CEFTRIAXONE: Performed by: NURSE PRACTITIONER

## 2020-02-13 PROCEDURE — 86140 C-REACTIVE PROTEIN: CPT | Performed by: NURSE PRACTITIONER

## 2020-02-13 PROCEDURE — 82150 ASSAY OF AMYLASE: CPT | Performed by: NURSE PRACTITIONER

## 2020-02-13 PROCEDURE — 87086 URINE CULTURE/COLONY COUNT: CPT | Performed by: NURSE PRACTITIONER

## 2020-02-13 PROCEDURE — 83690 ASSAY OF LIPASE: CPT | Performed by: NURSE PRACTITIONER

## 2020-02-13 PROCEDURE — 85025 COMPLETE CBC W/AUTO DIFF WBC: CPT | Performed by: NURSE PRACTITIONER

## 2020-02-13 PROCEDURE — 96365 THER/PROPH/DIAG IV INF INIT: CPT

## 2020-02-13 PROCEDURE — 84703 CHORIONIC GONADOTROPIN ASSAY: CPT | Performed by: NURSE PRACTITIONER

## 2020-02-13 PROCEDURE — 80053 COMPREHEN METABOLIC PANEL: CPT | Performed by: NURSE PRACTITIONER

## 2020-02-13 PROCEDURE — 36415 COLL VENOUS BLD VENIPUNCTURE: CPT

## 2020-02-13 PROCEDURE — 71045 X-RAY EXAM CHEST 1 VIEW: CPT

## 2020-02-13 PROCEDURE — 87186 SC STD MICRODIL/AGAR DIL: CPT | Performed by: NURSE PRACTITIONER

## 2020-02-13 PROCEDURE — 99283 EMERGENCY DEPT VISIT LOW MDM: CPT

## 2020-02-13 PROCEDURE — 71045 X-RAY EXAM CHEST 1 VIEW: CPT | Performed by: RADIOLOGY

## 2020-02-13 PROCEDURE — 81001 URINALYSIS AUTO W/SCOPE: CPT | Performed by: NURSE PRACTITIONER

## 2020-02-13 PROCEDURE — 87040 BLOOD CULTURE FOR BACTERIA: CPT | Performed by: NURSE PRACTITIONER

## 2020-02-13 PROCEDURE — 74176 CT ABD & PELVIS W/O CONTRAST: CPT | Performed by: RADIOLOGY

## 2020-02-13 PROCEDURE — 74176 CT ABD & PELVIS W/O CONTRAST: CPT

## 2020-02-13 PROCEDURE — 83605 ASSAY OF LACTIC ACID: CPT | Performed by: NURSE PRACTITIONER

## 2020-02-13 RX ORDER — NITROFURANTOIN 25; 75 MG/1; MG/1
100 CAPSULE ORAL 2 TIMES DAILY
Qty: 14 CAPSULE | Refills: 0 | Status: SHIPPED | OUTPATIENT
Start: 2020-02-13 | End: 2020-02-20

## 2020-02-13 RX ORDER — SODIUM CHLORIDE 0.9 % (FLUSH) 0.9 %
10 SYRINGE (ML) INJECTION AS NEEDED
Status: DISCONTINUED | OUTPATIENT
Start: 2020-02-13 | End: 2020-02-14 | Stop reason: HOSPADM

## 2020-02-13 RX ADMIN — CEFTRIAXONE 2 G: 2 INJECTION, POWDER, FOR SOLUTION INTRAMUSCULAR; INTRAVENOUS at 21:25

## 2020-02-13 RX ADMIN — SODIUM CHLORIDE 1000 ML: 9 INJECTION, SOLUTION INTRAVENOUS at 19:16

## 2020-02-14 NOTE — ED NOTES
Went to get patient's lactic and patient has gone to radiology. Will get when patient returns.     Mague Vasquez  02/13/20 2010

## 2020-02-14 NOTE — ED PROVIDER NOTES
Subjective     Abdominal Pain   Pain location:  LUQ  Pain quality: aching and sharp    Pain radiates to:  Does not radiate  Pain severity:  Moderate  Onset quality:  Sudden  Timing:  Constant  Progression:  Worsening  Chronicity:  New  Relieved by:  None tried  Worsened by:  Nothing  Ineffective treatments:  None tried  Associated symptoms: nausea    Associated symptoms: no chest pain, no dysuria and no fever        Review of Systems   Constitutional: Negative.  Negative for fever.   HENT: Negative.    Respiratory: Negative.    Cardiovascular: Negative.  Negative for chest pain.   Gastrointestinal: Positive for abdominal pain and nausea.   Endocrine: Negative.    Genitourinary: Negative.  Negative for dysuria.   Skin: Negative.    Neurological: Negative.    Psychiatric/Behavioral: Negative.    All other systems reviewed and are negative.      Past Medical History:   Diagnosis Date   • H/O seasonal allergies    • Psoriasis    • Psoriasis    • Psoriasis    • Urinary tract infection        No Known Allergies    Past Surgical History:   Procedure Laterality Date   • DILATATION AND CURETTAGE N/A 9/1/2017    Procedure: DILATATION AND CURETTAGE;  Surgeon: Juan Pablo Wood MD;  Location: [facility];  Service:        Family History   Problem Relation Age of Onset   • Diabetes Father        Social History     Socioeconomic History   • Marital status: Single     Spouse name: Not on file   • Number of children: Not on file   • Years of education: Not on file   • Highest education level: Not on file   Tobacco Use   • Smoking status: Current Every Day Smoker     Packs/day: 0.50     Types: Cigarettes   • Smokeless tobacco: Never Used   • Tobacco comment: PT STATES IS AROUND SMOKING EVERY DAY   Substance and Sexual Activity   • Alcohol use: No   • Drug use: No   • Sexual activity: Yes           Objective   Physical Exam   Constitutional: She is oriented to person, place, and time. She appears well-developed and well-nourished. No  distress.   HENT:   Head: Normocephalic and atraumatic.   Right Ear: External ear normal.   Left Ear: External ear normal.   Nose: Nose normal.   Eyes: Pupils are equal, round, and reactive to light. Conjunctivae and EOM are normal.   Neck: Normal range of motion. Neck supple. No JVD present. No tracheal deviation present.   Cardiovascular: Normal rate, regular rhythm and normal heart sounds.   No murmur heard.  Pulmonary/Chest: Effort normal and breath sounds normal. No respiratory distress. She has no wheezes.   Abdominal: Soft. Bowel sounds are normal. There is tenderness in the left upper quadrant. There is CVA tenderness.   Musculoskeletal: Normal range of motion. She exhibits no edema or deformity.   Neurological: She is alert and oriented to person, place, and time. No cranial nerve deficit.   Skin: Skin is warm and dry. No rash noted. She is not diaphoretic. No erythema. No pallor.   Psychiatric: She has a normal mood and affect. Her behavior is normal. Thought content normal.   Nursing note and vitals reviewed.      Procedures           ED Course                                           MDM  Number of Diagnoses or Management Options  Acute UTI: new and does not require workup     Amount and/or Complexity of Data Reviewed  Clinical lab tests: reviewed  Tests in the radiology section of CPT®: reviewed  Decide to obtain previous medical records or to obtain history from someone other than the patient: yes        Final diagnoses:   Acute UTI            Andra Abdul, APRN  02/14/20 0143

## 2020-02-15 LAB — BACTERIA SPEC AEROBE CULT: ABNORMAL

## 2020-02-18 LAB
BACTERIA SPEC AEROBE CULT: NORMAL
BACTERIA SPEC AEROBE CULT: NORMAL

## 2020-06-04 ENCOUNTER — OFFICE VISIT (OUTPATIENT)
Dept: SURGERY | Facility: CLINIC | Age: 20
End: 2020-06-04

## 2020-06-04 VITALS — WEIGHT: 150 LBS | HEIGHT: 66 IN | BODY MASS INDEX: 24.11 KG/M2

## 2020-06-04 DIAGNOSIS — L72.3 SEBACEOUS CYST: Primary | ICD-10-CM

## 2020-06-04 PROCEDURE — 99213 OFFICE O/P EST LOW 20 MIN: CPT | Performed by: SURGERY

## 2020-06-04 NOTE — H&P (VIEW-ONLY)
Subjective   Bianca Nieves is a 19 y.o. female.     Chief Complaint: sebaceous cyst    History of Present Illness She has had a cyst between her eyes on the bridge of the nose for some time that has grown and can be uncomfortable at times.     The following portions of the patient's history were reviewed and updated as appropriate: current medications, past family history, past medical history, past social history, past surgical history and problem list.    Review of Systems   Constitutional: Negative for activity change, appetite change, chills, fever and unexpected weight change.   HENT: Negative for congestion, facial swelling and sore throat.    Eyes: Negative for photophobia and visual disturbance.   Respiratory: Negative for chest tightness, shortness of breath and wheezing.    Cardiovascular: Negative for chest pain, palpitations and leg swelling.   Gastrointestinal: Negative for abdominal distention, abdominal pain, anal bleeding, blood in stool, constipation, diarrhea, nausea, rectal pain and vomiting.   Endocrine: Negative for cold intolerance, heat intolerance, polydipsia and polyuria.   Genitourinary: Negative for difficulty urinating, dysuria, flank pain and urgency.   Musculoskeletal: Negative for back pain and myalgias.   Skin: Negative for rash and wound.   Allergic/Immunologic: Negative for immunocompromised state.   Neurological: Negative for dizziness, seizures, syncope, light-headedness, numbness and headaches.   Hematological: Negative for adenopathy. Does not bruise/bleed easily.   Psychiatric/Behavioral: Negative for behavioral problems and confusion. The patient is not nervous/anxious.        Objective   Physical Exam   Constitutional: She is oriented to person, place, and time. She appears well-developed and well-nourished. She does not appear ill. No distress.       HENT:   Head: Normocephalic. Head is without laceration. Hair is normal.   Right Ear: Hearing and ear canal normal.   Left  Ear: Hearing and ear canal normal.   Nose: Nose normal. No sinus tenderness. No epistaxis. Right sinus exhibits no maxillary sinus tenderness and no frontal sinus tenderness. Left sinus exhibits no maxillary sinus tenderness and no frontal sinus tenderness.   Eyes: Pupils are equal, round, and reactive to light. Conjunctivae and lids are normal.   Neck: Normal range of motion. No JVD present. No tracheal tenderness present. No tracheal deviation present. No thyroid mass and no thyromegaly present.   Cardiovascular: Normal rate and regular rhythm. Exam reveals no gallop.   No murmur heard.  Pulmonary/Chest: Effort normal and breath sounds normal. No stridor. She has no wheezes. She exhibits no tenderness.   Abdominal: Soft. Bowel sounds are normal. She exhibits no distension, no ascites and no mass. There is no tenderness. There is no rebound and no guarding. No hernia.   Musculoskeletal: She exhibits no edema or deformity.   Lymphadenopathy:     She has no cervical adenopathy.     She has no axillary adenopathy.        Right: No inguinal and no supraclavicular adenopathy present.        Left: No inguinal and no supraclavicular adenopathy present.   Neurological: She is alert and oriented to person, place, and time. She exhibits normal muscle tone.   Skin: Skin is warm, dry and intact. No rash noted. No erythema. No pallor.   Psychiatric: She has a normal mood and affect. Her behavior is normal. Thought content normal.   Vitals reviewed.      Assessment/Plan   Bianca was seen today for sebaceous cyst.    Diagnoses and all orders for this visit:    Sebaceous cyst    schedule excision in the OR

## 2020-06-05 ENCOUNTER — TELEPHONE (OUTPATIENT)
Dept: SURGERY | Facility: CLINIC | Age: 20
End: 2020-06-05

## 2020-06-09 ENCOUNTER — TRANSCRIBE ORDERS (OUTPATIENT)
Dept: ADMINISTRATIVE | Facility: HOSPITAL | Age: 20
End: 2020-06-09

## 2020-06-09 DIAGNOSIS — Z01.818 PRE-OPERATIVE CLEARANCE: Primary | ICD-10-CM

## 2020-06-10 ENCOUNTER — LAB (OUTPATIENT)
Dept: LAB | Facility: HOSPITAL | Age: 20
End: 2020-06-10

## 2020-06-10 ENCOUNTER — APPOINTMENT (OUTPATIENT)
Dept: PREADMISSION TESTING | Facility: HOSPITAL | Age: 20
End: 2020-06-10

## 2020-06-10 DIAGNOSIS — Z01.818 PRE-OPERATIVE CLEARANCE: ICD-10-CM

## 2020-06-10 LAB
ANION GAP SERPL CALCULATED.3IONS-SCNC: 13 MMOL/L (ref 5–15)
BUN BLD-MCNC: 13 MG/DL (ref 6–20)
BUN/CREAT SERPL: 20.3 (ref 7–25)
CALCIUM SPEC-SCNC: 9.4 MG/DL (ref 8.6–10.5)
CHLORIDE SERPL-SCNC: 108 MMOL/L (ref 98–107)
CO2 SERPL-SCNC: 20 MMOL/L (ref 22–29)
CREAT BLD-MCNC: 0.64 MG/DL (ref 0.57–1)
DEPRECATED RDW RBC AUTO: 36.6 FL (ref 37–54)
ERYTHROCYTE [DISTWIDTH] IN BLOOD BY AUTOMATED COUNT: 12.1 % (ref 12.3–15.4)
GFR SERPL CREATININE-BSD FRML MDRD: 117 ML/MIN/1.73
GLUCOSE BLD-MCNC: 85 MG/DL (ref 65–99)
HCT VFR BLD AUTO: 40.1 % (ref 34–46.6)
HGB BLD-MCNC: 13.6 G/DL (ref 12–15.9)
MCH RBC QN AUTO: 28 PG (ref 26.6–33)
MCHC RBC AUTO-ENTMCNC: 33.9 G/DL (ref 31.5–35.7)
MCV RBC AUTO: 82.5 FL (ref 79–97)
PLATELET # BLD AUTO: 199 10*3/MM3 (ref 140–450)
PMV BLD AUTO: 10.5 FL (ref 6–12)
POTASSIUM BLD-SCNC: 3.9 MMOL/L (ref 3.5–5.2)
RBC # BLD AUTO: 4.86 10*6/MM3 (ref 3.77–5.28)
SODIUM BLD-SCNC: 141 MMOL/L (ref 136–145)
WBC NRBC COR # BLD: 9.24 10*3/MM3 (ref 3.4–10.8)

## 2020-06-10 PROCEDURE — C9803 HOPD COVID-19 SPEC COLLECT: HCPCS | Performed by: SURGERY

## 2020-06-10 PROCEDURE — 36415 COLL VENOUS BLD VENIPUNCTURE: CPT

## 2020-06-10 PROCEDURE — U0004 COV-19 TEST NON-CDC HGH THRU: HCPCS | Performed by: SURGERY

## 2020-06-10 PROCEDURE — U0002 COVID-19 LAB TEST NON-CDC: HCPCS | Performed by: SURGERY

## 2020-06-10 PROCEDURE — 85027 COMPLETE CBC AUTOMATED: CPT | Performed by: SURGERY

## 2020-06-10 PROCEDURE — 80048 BASIC METABOLIC PNL TOTAL CA: CPT | Performed by: SURGERY

## 2020-06-10 NOTE — DISCHARGE INSTRUCTIONS
TAKE the following medications the morning of surgery:    All heart or blood pressure medications    Please discontinue all blood thinners and anticoagulants (except aspirin) prior to surgery as per your surgeon and cardiologist instructions.  Aspirin may be continued up to the day prior to surgery.    HOLD all diabetic medications the morning of surgery as order by physician.    Arrival time for surgery on 6/12/20 is 0630 am.    A RESPONSIBLE PERSON MUST REMAIN IN THE WAITING ROOM DURING YOUR PROCEDURE AND A RESPONSIBLE  MUST BE AVAILABLE UPON YOUR DISCHARGE.    General Instructions:  • Do NOT eat or drink after midnight 6/11/20 which includes water, mints, or gum.  • You may brush your teeth. Dental appliances that are removable must be taken out day of surgery.  • Do NOT smoke, chew tobacco, or drink alcohol within 24 hours prior to surgery.  • Bring medications in original bottles, any inhalers and if applicable your C-PAP/BI-PAP machine  • Bring any papers given to you in the doctor’s office  • Wear clean, comfortable clothes and socks  • Do NOT wear contact lenses or make-up or dark nail polish.  Bring a case for your glasses if applicable.  • Bring crutches or walker if applicable  • Leave all other valuables and jewelry at home  • If you were given a blood bank armband, continue to wear it until discharged.    Preventing a Surgical Site Infection:  • Shower the night before surgery (unless instructed otherwise) using a fresh bar of anti-bacterial soap (such as Dial) and clean washcloth.  Dry with a clean towel and dress in clean clothing.  • For 2 to 3 days before surgery, avoid shaving with a razor near where you will have surgery because the razor can irritate skin and make it easier to develop an infection.  Ask your surgeon if you will be receiving antibiotics prior to surgery.  • Make sure you, your family, and all healthcare providers clean their hands with soap and water or an alcohol-based hand   before caring for you or your wound.  • If at all possible, quit smoking as many days before surgery as you can.    Day of Surgery:  Upon arrival, a pre-op nurse and anesthesiologist will review your health history, obtain vital signs, and answer questions you may have.  The only belongings needed at this time will be your home medications and if applicable you C-PAP/BI-PAP machine.  If you are staying overnight, your family can leave the rest of your belongings in the car and bring them to your room later.  A pre-op nurse will start an IV and you may receive medication in preparation for surgery.  Due to patient privacy and limited space, only one member of your family will be able to accompany you in the pre-op area.  While you are in surgery your family should notify the waiting room  if they leave the waiting room area and provide a contact number.  Please be aware that surgery does come with discomfort.  We want to make every effort to control your discomfort so please discuss any uncontrolled symptoms with your nurse.  Your doctor will most likely have prescribed pain medications.  If you are going home after surgery you will receive individualized written care instructions before being discharged.  A responsible adult must drive you to and from the hospital on the day of surgery and stay with you for 24 hours.  If you are staying overnight following surgery, you will be transported to your hospital room following the recovery period.

## 2020-06-11 LAB
REF LAB TEST METHOD: NORMAL
SARS-COV-2 RNA RESP QL NAA+PROBE: NOT DETECTED

## 2020-06-12 ENCOUNTER — ANESTHESIA (OUTPATIENT)
Dept: PERIOP | Facility: HOSPITAL | Age: 20
End: 2020-06-12

## 2020-06-12 ENCOUNTER — HOSPITAL ENCOUNTER (OUTPATIENT)
Facility: HOSPITAL | Age: 20
Setting detail: HOSPITAL OUTPATIENT SURGERY
Discharge: HOME | End: 2020-06-12
Attending: SURGERY | Admitting: SURGERY

## 2020-06-12 ENCOUNTER — ANESTHESIA EVENT (OUTPATIENT)
Dept: PERIOP | Facility: HOSPITAL | Age: 20
End: 2020-06-12

## 2020-06-12 VITALS
HEART RATE: 68 BPM | DIASTOLIC BLOOD PRESSURE: 69 MMHG | OXYGEN SATURATION: 99 % | TEMPERATURE: 97.1 F | SYSTOLIC BLOOD PRESSURE: 102 MMHG | HEIGHT: 66 IN | WEIGHT: 179 LBS | RESPIRATION RATE: 18 BRPM | BODY MASS INDEX: 28.77 KG/M2

## 2020-06-12 DIAGNOSIS — L72.3 SEBACEOUS CYST: ICD-10-CM

## 2020-06-12 DIAGNOSIS — L72.0 EPIDERMAL CYST OF FACE: Primary | ICD-10-CM

## 2020-06-12 LAB
B-HCG UR QL: NEGATIVE
INTERNAL NEGATIVE CONTROL: NEGATIVE
INTERNAL POSITIVE CONTROL: POSITIVE
Lab: NORMAL

## 2020-06-12 PROCEDURE — 25010000002 FENTANYL CITRATE (PF) 100 MCG/2ML SOLUTION: Performed by: NURSE ANESTHETIST, CERTIFIED REGISTERED

## 2020-06-12 PROCEDURE — 25010000002 PROPOFOL 10 MG/ML EMULSION: Performed by: NURSE ANESTHETIST, CERTIFIED REGISTERED

## 2020-06-12 PROCEDURE — 25010000002 ONDANSETRON PER 1 MG: Performed by: NURSE ANESTHETIST, CERTIFIED REGISTERED

## 2020-06-12 PROCEDURE — 81025 URINE PREGNANCY TEST: CPT | Performed by: ANESTHESIOLOGY

## 2020-06-12 PROCEDURE — 25010000002 MIDAZOLAM PER 1 MG: Performed by: NURSE ANESTHETIST, CERTIFIED REGISTERED

## 2020-06-12 PROCEDURE — 21013 EXC FACE TUM DEEP < 2 CM: CPT | Performed by: SURGERY

## 2020-06-12 RX ORDER — BUPIVACAINE HYDROCHLORIDE 5 MG/ML
INJECTION, SOLUTION PERINEURAL AS NEEDED
Status: DISCONTINUED | OUTPATIENT
Start: 2020-06-12 | End: 2020-06-12 | Stop reason: HOSPADM

## 2020-06-12 RX ORDER — FAMOTIDINE 10 MG/ML
INJECTION, SOLUTION INTRAVENOUS AS NEEDED
Status: DISCONTINUED | OUTPATIENT
Start: 2020-06-12 | End: 2020-06-12 | Stop reason: SURG

## 2020-06-12 RX ORDER — LIDOCAINE HYDROCHLORIDE 20 MG/ML
INJECTION, SOLUTION INFILTRATION; PERINEURAL AS NEEDED
Status: DISCONTINUED | OUTPATIENT
Start: 2020-06-12 | End: 2020-06-12 | Stop reason: SURG

## 2020-06-12 RX ORDER — SODIUM CHLORIDE, SODIUM LACTATE, POTASSIUM CHLORIDE, CALCIUM CHLORIDE 600; 310; 30; 20 MG/100ML; MG/100ML; MG/100ML; MG/100ML
125 INJECTION, SOLUTION INTRAVENOUS CONTINUOUS
Status: DISCONTINUED | OUTPATIENT
Start: 2020-06-12 | End: 2020-06-12 | Stop reason: HOSPADM

## 2020-06-12 RX ORDER — HYDROCODONE BITARTRATE AND ACETAMINOPHEN 5; 325 MG/1; MG/1
1 TABLET ORAL EVERY 4 HOURS PRN
Status: DISCONTINUED | OUTPATIENT
Start: 2020-06-12 | End: 2020-06-12 | Stop reason: HOSPADM

## 2020-06-12 RX ORDER — FENTANYL CITRATE 50 UG/ML
INJECTION, SOLUTION INTRAMUSCULAR; INTRAVENOUS AS NEEDED
Status: DISCONTINUED | OUTPATIENT
Start: 2020-06-12 | End: 2020-06-12 | Stop reason: SURG

## 2020-06-12 RX ORDER — ONDANSETRON 2 MG/ML
4 INJECTION INTRAMUSCULAR; INTRAVENOUS AS NEEDED
Status: DISCONTINUED | OUTPATIENT
Start: 2020-06-12 | End: 2020-06-12 | Stop reason: HOSPADM

## 2020-06-12 RX ORDER — HYDROCODONE BITARTRATE AND ACETAMINOPHEN 5; 325 MG/1; MG/1
1 TABLET ORAL EVERY 6 HOURS PRN
Qty: 8 TABLET | Refills: 0 | Status: SHIPPED | OUTPATIENT
Start: 2020-06-12 | End: 2020-06-22

## 2020-06-12 RX ORDER — IPRATROPIUM BROMIDE AND ALBUTEROL SULFATE 2.5; .5 MG/3ML; MG/3ML
3 SOLUTION RESPIRATORY (INHALATION) ONCE AS NEEDED
Status: DISCONTINUED | OUTPATIENT
Start: 2020-06-12 | End: 2020-06-12 | Stop reason: HOSPADM

## 2020-06-12 RX ORDER — FENTANYL CITRATE 50 UG/ML
50 INJECTION, SOLUTION INTRAMUSCULAR; INTRAVENOUS
Status: DISCONTINUED | OUTPATIENT
Start: 2020-06-12 | End: 2020-06-12 | Stop reason: HOSPADM

## 2020-06-12 RX ORDER — MEPERIDINE HYDROCHLORIDE 25 MG/ML
12.5 INJECTION INTRAMUSCULAR; INTRAVENOUS; SUBCUTANEOUS
Status: DISCONTINUED | OUTPATIENT
Start: 2020-06-12 | End: 2020-06-12 | Stop reason: HOSPADM

## 2020-06-12 RX ORDER — SODIUM CHLORIDE 0.9 % (FLUSH) 0.9 %
10 SYRINGE (ML) INJECTION EVERY 12 HOURS SCHEDULED
Status: DISCONTINUED | OUTPATIENT
Start: 2020-06-12 | End: 2020-06-12 | Stop reason: HOSPADM

## 2020-06-12 RX ORDER — PROPOFOL 10 MG/ML
VIAL (ML) INTRAVENOUS AS NEEDED
Status: DISCONTINUED | OUTPATIENT
Start: 2020-06-12 | End: 2020-06-12 | Stop reason: SURG

## 2020-06-12 RX ORDER — ONDANSETRON 2 MG/ML
INJECTION INTRAMUSCULAR; INTRAVENOUS AS NEEDED
Status: DISCONTINUED | OUTPATIENT
Start: 2020-06-12 | End: 2020-06-12 | Stop reason: SURG

## 2020-06-12 RX ORDER — SODIUM CHLORIDE 0.9 % (FLUSH) 0.9 %
10 SYRINGE (ML) INJECTION AS NEEDED
Status: DISCONTINUED | OUTPATIENT
Start: 2020-06-12 | End: 2020-06-12 | Stop reason: HOSPADM

## 2020-06-12 RX ORDER — MIDAZOLAM HYDROCHLORIDE 1 MG/ML
INJECTION INTRAMUSCULAR; INTRAVENOUS AS NEEDED
Status: DISCONTINUED | OUTPATIENT
Start: 2020-06-12 | End: 2020-06-12 | Stop reason: SURG

## 2020-06-12 RX ORDER — OXYCODONE HYDROCHLORIDE AND ACETAMINOPHEN 5; 325 MG/1; MG/1
1 TABLET ORAL ONCE AS NEEDED
Status: DISCONTINUED | OUTPATIENT
Start: 2020-06-12 | End: 2020-06-12 | Stop reason: HOSPADM

## 2020-06-12 RX ORDER — MAGNESIUM HYDROXIDE 1200 MG/15ML
LIQUID ORAL AS NEEDED
Status: DISCONTINUED | OUTPATIENT
Start: 2020-06-12 | End: 2020-06-12 | Stop reason: HOSPADM

## 2020-06-12 RX ADMIN — FENTANYL CITRATE 100 MCG: 50 INJECTION INTRAMUSCULAR; INTRAVENOUS at 07:24

## 2020-06-12 RX ADMIN — MIDAZOLAM HYDROCHLORIDE 2 MG: 1 INJECTION, SOLUTION INTRAMUSCULAR; INTRAVENOUS at 07:24

## 2020-06-12 RX ADMIN — LIDOCAINE HYDROCHLORIDE 40 MG: 20 INJECTION, SOLUTION INFILTRATION; PERINEURAL at 07:30

## 2020-06-12 RX ADMIN — ONDANSETRON 4 MG: 2 INJECTION INTRAMUSCULAR; INTRAVENOUS at 07:24

## 2020-06-12 RX ADMIN — PROPOFOL 150 MG: 10 INJECTION, EMULSION INTRAVENOUS at 07:30

## 2020-06-12 RX ADMIN — FAMOTIDINE 20 MG: 10 INJECTION INTRAVENOUS at 07:24

## 2020-06-12 RX ADMIN — SODIUM CHLORIDE, POTASSIUM CHLORIDE, SODIUM LACTATE AND CALCIUM CHLORIDE 125 ML/HR: 600; 310; 30; 20 INJECTION, SOLUTION INTRAVENOUS at 07:01

## 2020-06-12 NOTE — ANESTHESIA PROCEDURE NOTES
Airway  Urgency: elective    Date/Time: 6/12/2020 7:30 AM  Airway not difficult    General Information and Staff    Patient location during procedure: OR  CRNA: Viridiana Wayne CRNA    Indications and Patient Condition  Indications for airway management: airway protection    Preoxygenated: yes  MILS maintained throughout  Mask difficulty assessment: 0 - not attempted    Final Airway Details  Final airway type: supraglottic airway      Successful airway: unique  Size 3    Number of attempts at approach: 1  Assessment: lips, teeth, and gum same as pre-op

## 2020-06-12 NOTE — ANESTHESIA POSTPROCEDURE EVALUATION
Patient: Bianca Nieves    Procedure Summary     Date:  06/12/20 Room / Location:  [facility] OR 01 /  [facility] OR    Anesthesia Start:  0724 Anesthesia Stop:  0757    Procedure:  FACIAL LESION/CYST EXCISION (N/A ) Diagnosis:       Sebaceous cyst      (Sebaceous cyst [L72.3])    Surgeon:  Jay Cohen MD Provider:  Todd Rain MD    Anesthesia Type:  epidural, general ASA Status:  2          Anesthesia Type: epidural, general    Vitals  Vitals Value Taken Time   /62 6/12/2020  8:25 AM   Temp 97.3 °F (36.3 °C) 6/12/2020  8:00 AM   Pulse 70 6/12/2020  8:25 AM   Resp 15 6/12/2020  8:25 AM   SpO2 99 % 6/12/2020  8:25 AM           Post Anesthesia Care and Evaluation    Patient location during evaluation: PHASE II  Patient participation: complete - patient participated  Level of consciousness: awake and alert  Pain score: 1  Pain management: adequate  Airway patency: patent  Anesthetic complications: No anesthetic complications  PONV Status: controlled  Cardiovascular status: acceptable  Respiratory status: acceptable  Hydration status: acceptable

## 2020-06-12 NOTE — ANESTHESIA PREPROCEDURE EVALUATION
Anesthesia Evaluation     Patient summary reviewed and Nursing notes reviewed   NPO Solid Status: > 8 hours  NPO Liquid Status: > 8 hours           Airway   Mallampati: II  TM distance: >3 FB  Neck ROM: full  No difficulty expected  Dental - normal exam     Pulmonary - normal exam   (+) a smoker Current Smoked day of surgery,   Cardiovascular - negative cardio ROS and normal exam  Exercise tolerance: good (4-7 METS)    NYHA Classification: II        Neuro/Psych- negative ROS  GI/Hepatic/Renal/Endo - negative ROS     Musculoskeletal (-) negative ROS    Abdominal  - normal exam    Bowel sounds: normal.   Substance History - negative use     OB/GYN negative ob/gyn ROS   (-)  Pregnant        Other - negative ROS                         Anesthesia Plan    ASA 2     epidural and general     intravenous induction     Anesthetic plan, all risks, benefits, and alternatives have been provided, discussed and informed consent has been obtained with: patient.    Plan discussed with CRNA.

## 2020-06-12 NOTE — OP NOTE
HEAD NECK LESION/CYST EXCISION  Procedure Note    Bianca Nieves  6/12/2020    Pre-op Diagnosis:   Sebaceous cyst [L72.3]    Post-op Diagnosis:  Facial cyst       Procedure(s):  FACIAL LESION/CYST EXCISION    Surgeon(s):  Jay Cohen MD    Anesthesia: Epidural, General    Staff:   Circulator: Jah Oconnor RN  Assistant: Jimmie Venegas  Other: Agustín Stevens    Estimated Blood Loss: minimal    Specimens:                Order Name Source Comment Collection Info Order Time   TISSUE PATHOLOGY EXAM Face  Collected By: Jay Cohen MD 6/12/2020  7:45 AM         Drains: * No LDAs found *    Procedure: The forehead was prepped and draped. She had a small vertical incision about 1.5 cm made between the eyebrows. The cyst was deep to the skin down to the bone and was full of clear fluid. It was dissected out and some cautery used. It was about 2 cm diameter. The subcutaneous tissue and skin was closed with 4-0 vicryl.     Findings: cyst    Complications: none   Grafts / Implants N/A    Jay Cohen MD     Date: 6/12/2020  Time: 07:52   Split-Thickness Skin Graft Text: The defect edges were debeveled with a #15 scalpel blade.  Given the location of the defect, shape of the defect and the proximity to free margins a split thickness skin graft was deemed most appropriate.  Using a sterile surgical marker, the primary defect shape was transferred to the donor site. The split thickness graft was then harvested.  The skin graft was then placed in the primary defect and oriented appropriately.

## 2020-06-15 LAB
LAB AP CASE REPORT: NORMAL
PATH REPORT.FINAL DX SPEC: NORMAL

## 2020-06-22 ENCOUNTER — OFFICE VISIT (OUTPATIENT)
Dept: SURGERY | Facility: CLINIC | Age: 20
End: 2020-06-22

## 2020-06-22 VITALS — HEIGHT: 66 IN | WEIGHT: 179 LBS | BODY MASS INDEX: 28.77 KG/M2

## 2020-06-22 DIAGNOSIS — Z51.89 VISIT FOR WOUND CHECK: Primary | ICD-10-CM

## 2020-06-22 PROBLEM — L72.3 SEBACEOUS CYST: Status: RESOLVED | Noted: 2020-01-23 | Resolved: 2020-06-22

## 2020-06-22 PROCEDURE — 99024 POSTOP FOLLOW-UP VISIT: CPT | Performed by: SURGERY

## 2020-06-22 NOTE — PROGRESS NOTES
Subjective   Bianca Nieves is a 19 y.o. female.     Chief Complaint: post facial cyst excision    History of Present Illness Her cyst was fluid filled and was benign. The wound has     The following portions of the patient's history were reviewed and updated as appropriate: current medications, past family history, past medical history, past social history, past surgical history and problem list.    Review of Systems    Objective   Physical Exam wound ok, mild swelling    Assessment/Plan   Bianca was seen today for follow-up.    Diagnoses and all orders for this visit:    Visit for wound check    return 6 weeks

## 2020-07-07 ENCOUNTER — OFFICE VISIT (OUTPATIENT)
Dept: FAMILY MEDICINE CLINIC | Facility: CLINIC | Age: 20
End: 2020-07-07

## 2020-07-07 VITALS
TEMPERATURE: 97.1 F | WEIGHT: 186 LBS | SYSTOLIC BLOOD PRESSURE: 118 MMHG | BODY MASS INDEX: 29.89 KG/M2 | OXYGEN SATURATION: 98 % | DIASTOLIC BLOOD PRESSURE: 70 MMHG | HEART RATE: 90 BPM | HEIGHT: 66 IN

## 2020-07-07 DIAGNOSIS — Z30.09 FAMILY PLANNING: ICD-10-CM

## 2020-07-07 DIAGNOSIS — F33.0 MAJOR DEPRESSIVE DISORDER, RECURRENT, MILD (HCC): ICD-10-CM

## 2020-07-07 DIAGNOSIS — Z72.0 TOBACCO ABUSE: Primary | ICD-10-CM

## 2020-07-07 DIAGNOSIS — F41.1 GAD (GENERALIZED ANXIETY DISORDER): ICD-10-CM

## 2020-07-07 PROCEDURE — 84443 ASSAY THYROID STIM HORMONE: CPT | Performed by: NURSE PRACTITIONER

## 2020-07-07 PROCEDURE — 99213 OFFICE O/P EST LOW 20 MIN: CPT | Performed by: NURSE PRACTITIONER

## 2020-07-07 PROCEDURE — 84439 ASSAY OF FREE THYROXINE: CPT | Performed by: NURSE PRACTITIONER

## 2020-07-07 PROCEDURE — 81025 URINE PREGNANCY TEST: CPT | Performed by: NURSE PRACTITIONER

## 2020-07-07 RX ORDER — PAROXETINE HYDROCHLORIDE 20 MG/1
20 TABLET, FILM COATED ORAL EVERY MORNING
Qty: 30 TABLET | Refills: 2 | Status: SHIPPED | OUTPATIENT
Start: 2020-07-07 | End: 2020-11-11

## 2020-07-07 RX ORDER — DESOGESTREL AND ETHINYL ESTRADIOL AND ETHINYL ESTRADIOL 21-5 (28)
1 KIT ORAL DAILY
Qty: 28 TABLET | Refills: 11 | Status: ON HOLD | OUTPATIENT
Start: 2020-07-07 | End: 2022-05-31

## 2020-07-07 RX ORDER — ESCITALOPRAM OXALATE 10 MG/1
10 TABLET ORAL DAILY
COMMUNITY
End: 2020-07-07

## 2020-07-07 NOTE — PROGRESS NOTES
Subjective   Bianca Nieves is a 19 y.o. female.     Chief Complaint: Establish Care and Anxiety    Anxiety   Presents for initial visit. Onset was 1 to 5 years ago. The problem has been unchanged. Symptoms include depressed mood, insomnia, irritability, nervous/anxious behavior and panic. Patient reports no suicidal ideas. Symptoms occur most days. The severity of symptoms is causing significant distress. The quality of sleep is fair. Nighttime awakenings: occasional.     Her past medical history is significant for anxiety/panic attacks and depression. Past treatments include SSRIs. The treatment provided no relief. Compliance with prior treatments has been good.   Depression   Visit Type: initial  Onset of symptoms: more than 1 year ago  Progression since onset: unchanged  Patient presents with the following symptoms: depressed mood, insomnia, irritability, nervousness/anxiety and panic.  Patient is not experiencing: suicidal ideas.  Frequency of symptoms: most days   Severity: causing significant distress   Sleep quality: fair  Nighttime awakenings: occasional  Patient has a history of: anxiety/panic attacks and depression  Treatment tried: SSRI  Compliance with treatment: good  Improvement on treatment: no relief      Nicotine Dependence   Presents for initial visit. Symptoms include cravings, insomnia and irritability. Preferred tobacco types include cigarettes. Preferred cigarette types include filtered. Her urge triggers include company of smokers. Her first smoke is from 8 to 10 AM. She smokes < 1/2 a pack of cigarettes per day. She started smoking when she was >15 years old. Past treatments include nothing. Bianca is thinking about quitting. Bianca has tried to quit 2 times. There is no history of alcohol abuse and drug use.   Contraception   This is a chronic problem. The current episode started more than 1 year ago. Associated symptoms comments: Last pregnancy/childbirth one year ago. She has been taking  Viorele and tolerating well.  She is currently needing refill on prescription. Treatments tried: Viorele. The treatment provided significant relief.      Pt is here today to establish care.  She has been following with Bonnie Dean NP, but wishes to change providers at this time.   Patient has a hx of anxiety and depression.  She is currently taking hydroxyzine prn.  She had started on Lexapro and did not help with her symptoms so she had completely stopped the medication a month ago.   LMP June /2020.  Last pap in 2019.  Normal findings.         Family History   Problem Relation Age of Onset   • Diabetes Father        Social History     Socioeconomic History   • Marital status: Single     Spouse name: Not on file   • Number of children: Not on file   • Years of education: Not on file   • Highest education level: Not on file   Tobacco Use   • Smoking status: Current Every Day Smoker     Packs/day: 0.50     Types: Cigarettes   • Smokeless tobacco: Never Used   • Tobacco comment: PT STATES IS AROUND SMOKING EVERY DAY   Substance and Sexual Activity   • Alcohol use: No   • Drug use: No   • Sexual activity: Defer       Past Medical History:   Diagnosis Date   • H/O seasonal allergies    • Psoriasis    • Psoriasis    • Psoriasis    • Urinary tract infection        Review of Systems   Constitutional: Positive for irritability.   HENT: Negative.    Respiratory: Negative.    Cardiovascular: Negative.    Gastrointestinal: Negative.    Musculoskeletal: Negative.    Skin: Negative.    Neurological: Negative.    Psychiatric/Behavioral: Negative for suicidal ideas. The patient is nervous/anxious and has insomnia.        Objective   Physical Exam   Constitutional: She is oriented to person, place, and time. She appears well-developed and well-nourished.   Neck: Normal range of motion. Neck supple.   Cardiovascular: Normal rate, regular rhythm and normal heart sounds.   Pulmonary/Chest: Effort normal and breath sounds normal.  "  Neurological: She is alert and oriented to person, place, and time.   Skin: Skin is warm and dry.   Psychiatric: She has a normal mood and affect. Her behavior is normal. Judgment and thought content normal.   Nursing note and vitals reviewed.      Procedures    Vitals: Blood pressure 118/70, pulse 90, temperature 97.1 °F (36.2 °C), height 167.6 cm (66\"), weight 84.4 kg (186 lb), last menstrual period 06/10/2020, SpO2 98 %, not currently breastfeeding.    Body mass index is 30.02 kg/m².     Allergies: No Known Allergies     During this visit the following were done:  Labs Reviewed []    Labs Ordered []    Radiology Reports Reviewed []    Radiology Ordered []    PCP Records Reviewed []    Referring Provider Records Reviewed []    ER Records Reviewed []    Hospital Records Reviewed []    History Obtained From Family []    Radiology Images Reviewed []    Other Reviewed []    Records Requested []      Assessment/Plan   Bianca was seen today for establish care and anxiety.    Diagnoses and all orders for this visit:    Tobacco abuse    Major depressive disorder, recurrent, mild (CMS/HCC)  -     TSH; Future  -     T4, Free; Future  -     Start PARoxetine (Paxil) 20 MG tablet; Take 1 tablet by mouth Every Morning.    KHOI (generalized anxiety disorder)  -     TSH; Future  -     T4, Free; Future  -     Start PARoxetine (Paxil) 20 MG tablet; Take 1 tablet by mouth Every Morning.    Family planning  -     VIORELE 0.15-0.02/0.01 MG (21/5) per tablet; Take 1 tablet by mouth Daily.  -     POC Pregnancy, Urine               "

## 2020-07-08 LAB
T4 FREE SERPL-MCNC: 1.01 NG/DL (ref 0.93–1.7)
TSH SERPL DL<=0.05 MIU/L-ACNC: 1.55 UIU/ML (ref 0.27–4.2)

## 2020-11-11 ENCOUNTER — OFFICE VISIT (OUTPATIENT)
Dept: FAMILY MEDICINE CLINIC | Facility: CLINIC | Age: 20
End: 2020-11-11

## 2020-11-11 VITALS
BODY MASS INDEX: 29.89 KG/M2 | HEART RATE: 119 BPM | DIASTOLIC BLOOD PRESSURE: 78 MMHG | OXYGEN SATURATION: 98 % | SYSTOLIC BLOOD PRESSURE: 120 MMHG | WEIGHT: 186 LBS | TEMPERATURE: 96.8 F | HEIGHT: 66 IN

## 2020-11-11 DIAGNOSIS — N94.9 ADNEXAL CYST: ICD-10-CM

## 2020-11-11 DIAGNOSIS — Z30.9 ENCOUNTER FOR CONTRACEPTIVE MANAGEMENT, UNSPECIFIED TYPE: ICD-10-CM

## 2020-11-11 DIAGNOSIS — F33.0 MAJOR DEPRESSIVE DISORDER, RECURRENT, MILD (HCC): Primary | ICD-10-CM

## 2020-11-11 DIAGNOSIS — R10.2 PELVIC PAIN: ICD-10-CM

## 2020-11-11 DIAGNOSIS — F41.1 GAD (GENERALIZED ANXIETY DISORDER): ICD-10-CM

## 2020-11-11 LAB
B-HCG UR QL: NEGATIVE
BILIRUB BLD-MCNC: NEGATIVE MG/DL
CLARITY, POC: CLEAR
COLOR UR: YELLOW
GLUCOSE UR STRIP-MCNC: NEGATIVE MG/DL
INTERNAL NEGATIVE CONTROL: NEGATIVE
INTERNAL POSITIVE CONTROL: POSITIVE
KETONES UR QL: NEGATIVE
LEUKOCYTE EST, POC: NEGATIVE
Lab: NORMAL
NITRITE UR-MCNC: NEGATIVE MG/ML
PH UR: 6 [PH] (ref 5–8)
PROT UR STRIP-MCNC: ABNORMAL MG/DL
RBC # UR STRIP: NEGATIVE /UL
SP GR UR: 1.02 (ref 1–1.03)
UROBILINOGEN UR QL: NORMAL

## 2020-11-11 PROCEDURE — 99214 OFFICE O/P EST MOD 30 MIN: CPT | Performed by: NURSE PRACTITIONER

## 2020-11-11 RX ORDER — ETONOGESTREL 68 MG/1
1 IMPLANT SUBCUTANEOUS ONCE
Qty: 1 EACH | Refills: 0 | Status: ON HOLD
Start: 2020-11-11 | End: 2022-05-31

## 2020-11-11 NOTE — PROGRESS NOTES
Subjective   Bianca Nieves is a 20 y.o. female.     Chief Complaint: Follow-up, Anxiety, and Contraception    Contraception  This is a chronic problem. The current episode started more than 1 year ago. Associated symptoms comments: Pt would like to change her birth control to nexplonon.  She has tried OBC and is currently taking Viorele.  She has not had a menstrual cycle in 4 months.  . Nothing aggravates the symptoms.   Pelvic Pain  The patient's primary symptoms include pelvic pain. This is a recurrent problem. The problem has been waxing and waning. Associated symptoms comments: Patient has been to ER multiple times and told she has UTIs.  However, CT of abd has shown she has adnexal cysts. Nothing aggravates the symptoms. She has tried nothing for the symptoms. She uses oral contraceptives for contraception. Her menstrual history has been irregular.   Anxiety   Presents for initial visit. Onset was 1 to 5 years ago. The problem has been unchanged. Symptoms include depressed mood, insomnia, irritability, nervous/anxious behavior and panic. Patient reports no suicidal ideas. Symptoms occur most days. The severity of symptoms is causing significant distress. The quality of sleep is fair. Nighttime awakenings: occasional. Her past medical history is significant for anxiety/panic attacks and depression. Past treatments include SSRIs. The treatment provided no relief. Compliance with prior treatments has been good.   Depression   Visit Type: initial  Onset of symptoms: more than 1 year ago  Progression since onset: unchanged  Patient presents with the following symptoms: depressed mood, insomnia, irritability, nervousness/anxiety and panic.  Patient is not experiencing: suicidal ideas.  Frequency of symptoms: most days   Severity: causing significant distress   Sleep quality: fair  Nighttime awakenings: occasional  Patient has a history of: anxiety/panic attacks and depression  Treatment tried: SSRI  Compliance with  treatment: good  Improvement on treatment: no relief  She has tried lexapro without any success.  She was prescribed Paxil at her previous visit. She states that it made her really nauseated and she could not tolerate it.  She would like to try a different medication if possible.     Family History   Problem Relation Age of Onset   • Diabetes Father        Social History     Socioeconomic History   • Marital status: Single     Spouse name: Not on file   • Number of children: Not on file   • Years of education: Not on file   • Highest education level: Not on file   Tobacco Use   • Smoking status: Current Every Day Smoker     Packs/day: 0.50     Types: Cigarettes   • Smokeless tobacco: Never Used   • Tobacco comment: PT STATES IS AROUND SMOKING EVERY DAY   Substance and Sexual Activity   • Alcohol use: No   • Drug use: No   • Sexual activity: Defer       Past Medical History:   Diagnosis Date   • Anxiety    • Depression    • H/O seasonal allergies    • Psoriasis    • Psoriasis    • Psoriasis    • Urinary tract infection        Review of Systems   Constitutional: Negative.    HENT: Negative.    Respiratory: Negative.    Cardiovascular: Negative.    Gastrointestinal: Negative.    Genitourinary: Positive for pelvic pain.   Musculoskeletal: Negative.    Skin: Negative.    Neurological: Negative.    Psychiatric/Behavioral: Negative.        Objective   Physical Exam  Vitals signs and nursing note reviewed.   Constitutional:       Appearance: She is well-developed.   Neck:      Musculoskeletal: Normal range of motion and neck supple.   Cardiovascular:      Rate and Rhythm: Normal rate.   Pulmonary:      Effort: Pulmonary effort is normal.   Skin:     General: Skin is warm and dry.   Neurological:      Mental Status: She is alert and oriented to person, place, and time.   Psychiatric:         Behavior: Behavior normal.         Thought Content: Thought content normal.         Judgment: Judgment normal.  "        Procedures    Vitals: Blood pressure 120/78, pulse 119, temperature 96.8 °F (36 °C), height 167.6 cm (66\"), weight 84.4 kg (186 lb), SpO2 98 %, not currently breastfeeding.    Body mass index is 30.02 kg/m².     Allergies: No Known Allergies     During this visit the following were done:  Labs Reviewed []    Labs Ordered []    Radiology Reports Reviewed []    Radiology Ordered []    PCP Records Reviewed []    Referring Provider Records Reviewed []    ER Records Reviewed []    Hospital Records Reviewed []    History Obtained From Family []    Radiology Images Reviewed []    Other Reviewed []    Records Requested []      Assessment/Plan   Diagnoses and all orders for this visit:    1. Major depressive disorder, recurrent, mild (CMS/HCC) (Primary)  -     sertraline (Zoloft) 50 MG tablet; Take 1 tablet by mouth Daily.  Dispense: 30 tablet; Refill: 2    2. KHOI (generalized anxiety disorder)  -     sertraline (Zoloft) 50 MG tablet; Take 1 tablet by mouth Daily.  Dispense: 30 tablet; Refill: 2    3. Encounter for contraceptive management, unspecified type  -     Etonogestrel (Nexplanon) 68 MG implant subdermal implant; Inject 1 each into the appropriate area of the skin as directed by provider 1 (One) Time for 1 dose.  Dispense: 1 each; Refill: 0    4. Adnexal cyst  -     Ambulatory Referral to Gynecology    5. Pelvic pain  -     Ambulatory Referral to Gynecology  -     POCT urinalysis dipstick, automated  -     POCT pregnancy, urine    Stop Paxil.  Start zoloft. Re-evaluate in 3 weeks.   Order for nexplanon birth control.              "

## 2020-12-03 ENCOUNTER — OFFICE VISIT (OUTPATIENT)
Dept: FAMILY MEDICINE CLINIC | Facility: CLINIC | Age: 20
End: 2020-12-03

## 2020-12-03 VITALS
DIASTOLIC BLOOD PRESSURE: 70 MMHG | SYSTOLIC BLOOD PRESSURE: 126 MMHG | HEIGHT: 66 IN | WEIGHT: 186.6 LBS | BODY MASS INDEX: 29.99 KG/M2 | TEMPERATURE: 97.3 F | HEART RATE: 78 BPM | OXYGEN SATURATION: 99 %

## 2020-12-03 DIAGNOSIS — L40.9 PSORIASIS: ICD-10-CM

## 2020-12-03 DIAGNOSIS — F33.0 MAJOR DEPRESSIVE DISORDER, RECURRENT, MILD (HCC): Primary | ICD-10-CM

## 2020-12-03 DIAGNOSIS — F41.1 GAD (GENERALIZED ANXIETY DISORDER): ICD-10-CM

## 2020-12-03 PROCEDURE — 99214 OFFICE O/P EST MOD 30 MIN: CPT | Performed by: NURSE PRACTITIONER

## 2020-12-03 RX ORDER — METHYLPREDNISOLONE 4 MG/1
TABLET ORAL
Qty: 21 EACH | Refills: 0 | Status: ON HOLD | OUTPATIENT
Start: 2020-12-03 | End: 2022-05-31

## 2020-12-03 RX ORDER — HYDROXYZINE PAMOATE 25 MG/1
25 CAPSULE ORAL 3 TIMES DAILY PRN
Qty: 60 CAPSULE | Refills: 2 | Status: ON HOLD | OUTPATIENT
Start: 2020-12-03 | End: 2022-06-01

## 2020-12-03 NOTE — PROGRESS NOTES
Subjective   Bianca Nieves is a 20 y.o. female.     Chief Complaint: Depression    Rash  This is a chronic problem. The current episode started more than 1 year ago. The problem has been waxing and waning since onset. The affected locations include the right lower leg, left lower leg, right elbow and left elbow. (Psoriasis)   Anxiety   Presents for initial visit. Onset was 1 to 5 years ago. The problem has been unchanged. Symptoms include depressed mood, insomnia, irritability, nervous/anxious behavior and panic. Patient reports no suicidal ideas. Symptoms occur most days. The severity of symptoms is causing significant distress. The quality of sleep is fair. Nighttime awakenings: occasional. Her past medical history is significant for anxiety/panic attacks and depression. Past treatments include SSRIs. The treatment provided no relief. Compliance with prior treatments has been good.   Depression   Visit Type: initial  Onset of symptoms: more than 1 year ago  Progression since onset: unchanged  Patient presents with the following symptoms: depressed mood, insomnia, irritability, nervousness/anxiety and panic.  Patient is not experiencing: suicidal ideas.  Frequency of symptoms: most days   Severity: causing significant distress   Sleep quality: fair  Nighttime awakenings: occasional  Patient has a history of: anxiety/panic attacks and depression  Treatment tried: SSRI  Compliance with treatment: good  Improvement on treatment: no relief  She has tried lexapro and Paxil without relief of symtpoms.  She was prescribed zoloft at previous visit. She is doing well with the medication and her symptoms are well controlled at this time.     Family History   Problem Relation Age of Onset   • Diabetes Father        Social History     Socioeconomic History   • Marital status: Single     Spouse name: Not on file   • Number of children: Not on file   • Years of education: Not on file   • Highest education level: Not on file  "  Tobacco Use   • Smoking status: Current Every Day Smoker     Packs/day: 0.50     Years: 4.00     Pack years: 2.00     Types: Cigarettes   • Smokeless tobacco: Never Used   • Tobacco comment: PT STATES IS AROUND SMOKING EVERY DAY   Substance and Sexual Activity   • Alcohol use: No   • Drug use: No   • Sexual activity: Defer       Past Medical History:   Diagnosis Date   • Anxiety    • Depression    • H/O seasonal allergies    • Psoriasis    • Psoriasis    • Psoriasis    • Urinary tract infection        Review of Systems   Constitutional: Negative.    HENT: Negative.    Respiratory: Negative.    Cardiovascular: Negative.    Gastrointestinal: Negative.    Musculoskeletal: Negative.    Skin: Positive for rash.   Neurological: Negative.    Psychiatric/Behavioral: Negative.        Objective   Physical Exam  Vitals signs and nursing note reviewed.   Constitutional:       Appearance: She is well-developed.   Neck:      Musculoskeletal: Normal range of motion and neck supple.   Cardiovascular:      Rate and Rhythm: Normal rate and regular rhythm.      Heart sounds: Normal heart sounds.   Pulmonary:      Effort: Pulmonary effort is normal.      Breath sounds: Normal breath sounds.   Skin:     General: Skin is warm and dry.      Comments: Psoriasis to bilateral elbows and lower extremities   Neurological:      Mental Status: She is alert and oriented to person, place, and time.   Psychiatric:         Behavior: Behavior normal.         Thought Content: Thought content normal.         Judgment: Judgment normal.         Procedures    Vitals: Blood pressure 126/70, pulse 78, temperature 97.3 °F (36.3 °C), height 167.6 cm (65.98\"), weight 84.6 kg (186 lb 9.6 oz), SpO2 99 %, not currently breastfeeding.    Body mass index is 30.13 kg/m².     Allergies: No Known Allergies     During this visit the following were done:  Labs Reviewed []    Labs Ordered []    Radiology Reports Reviewed []    Radiology Ordered []    PCP Records " Reviewed []    Referring Provider Records Reviewed []    ER Records Reviewed []    Hospital Records Reviewed []    History Obtained From Family []    Radiology Images Reviewed []    Other Reviewed []    Records Requested []      Assessment/Plan   Diagnoses and all orders for this visit:    1. Major depressive disorder, recurrent, mild (CMS/HCC) (Primary)  -     sertraline (Zoloft) 50 MG tablet; Take 1 tablet by mouth Daily.  Dispense: 30 tablet; Refill: 5    2. KHOI (generalized anxiety disorder)  -     hydrOXYzine pamoate (VISTARIL) 25 MG capsule; Take 1 capsule by mouth 3 (Three) Times a Day As Needed for Anxiety.  Dispense: 60 capsule; Refill: 2  -     sertraline (Zoloft) 50 MG tablet; Take 1 tablet by mouth Daily.  Dispense: 30 tablet; Refill: 5    3. Psoriasis  -     methylPREDNISolone (MEDROL) 4 MG dose pack; Take as directed on package instructions.  Dispense: 21 each; Refill: 0

## 2020-12-23 ENCOUNTER — TELEPHONE (OUTPATIENT)
Dept: FAMILY MEDICINE CLINIC | Facility: CLINIC | Age: 20
End: 2020-12-23

## 2020-12-23 NOTE — TELEPHONE ENCOUNTER
Called patient to let her know that her nexplanon has come in and we need to schedule appointment when she is on day 1-3 of her period. Ok for hub to relay message.    Patient returned call and notified.

## 2021-03-15 ENCOUNTER — BULK ORDERING (OUTPATIENT)
Dept: CASE MANAGEMENT | Facility: OTHER | Age: 21
End: 2021-03-15

## 2021-03-15 DIAGNOSIS — Z23 IMMUNIZATION DUE: ICD-10-CM

## 2021-07-27 ENCOUNTER — HOSPITAL ENCOUNTER (EMERGENCY)
Facility: HOSPITAL | Age: 21
Discharge: HOME | End: 2021-07-27
Attending: EMERGENCY MEDICINE | Admitting: EMERGENCY MEDICINE

## 2021-07-27 VITALS
SYSTOLIC BLOOD PRESSURE: 121 MMHG | WEIGHT: 140 LBS | BODY MASS INDEX: 23.32 KG/M2 | TEMPERATURE: 98 F | HEART RATE: 80 BPM | OXYGEN SATURATION: 99 % | RESPIRATION RATE: 14 BRPM | DIASTOLIC BLOOD PRESSURE: 57 MMHG | HEIGHT: 65 IN

## 2021-07-27 DIAGNOSIS — U07.1 COVID-19: Primary | ICD-10-CM

## 2021-07-27 LAB
FLUAV RNA RESP QL NAA+PROBE: NOT DETECTED
FLUBV RNA RESP QL NAA+PROBE: NOT DETECTED
SARS-COV-2 RNA RESP QL NAA+PROBE: DETECTED

## 2021-07-27 PROCEDURE — C9803 HOPD COVID-19 SPEC COLLECT: HCPCS

## 2021-07-27 PROCEDURE — 87636 SARSCOV2 & INF A&B AMP PRB: CPT | Performed by: PHYSICIAN ASSISTANT

## 2021-07-27 PROCEDURE — 99283 EMERGENCY DEPT VISIT LOW MDM: CPT

## 2021-08-26 ENCOUNTER — HOSPITAL ENCOUNTER (EMERGENCY)
Facility: HOSPITAL | Age: 21
Discharge: HOME | End: 2021-08-26
Attending: EMERGENCY MEDICINE | Admitting: EMERGENCY MEDICINE

## 2021-08-26 VITALS
DIASTOLIC BLOOD PRESSURE: 69 MMHG | HEIGHT: 65 IN | HEART RATE: 61 BPM | OXYGEN SATURATION: 98 % | TEMPERATURE: 98.6 F | BODY MASS INDEX: 23.32 KG/M2 | WEIGHT: 140 LBS | SYSTOLIC BLOOD PRESSURE: 121 MMHG | RESPIRATION RATE: 16 BRPM

## 2021-08-26 DIAGNOSIS — N39.0 BACTERIAL UTI: Primary | ICD-10-CM

## 2021-08-26 DIAGNOSIS — A49.9 BACTERIAL UTI: Primary | ICD-10-CM

## 2021-08-26 LAB
ALBUMIN SERPL-MCNC: 4.72 G/DL (ref 3.5–5.2)
ALBUMIN/GLOB SERPL: 1.5 G/DL
ALP SERPL-CCNC: 88 U/L (ref 39–117)
ALT SERPL W P-5'-P-CCNC: 19 U/L (ref 1–33)
ANION GAP SERPL CALCULATED.3IONS-SCNC: 10.2 MMOL/L (ref 5–15)
AST SERPL-CCNC: 16 U/L (ref 1–32)
BACTERIA UR QL AUTO: ABNORMAL /HPF
BASOPHILS # BLD AUTO: 0.03 10*3/MM3 (ref 0–0.2)
BASOPHILS NFR BLD AUTO: 0.4 % (ref 0–1.5)
BILIRUB SERPL-MCNC: 0.4 MG/DL (ref 0–1.2)
BILIRUB UR QL STRIP: NEGATIVE
BUN SERPL-MCNC: 11 MG/DL (ref 6–20)
BUN/CREAT SERPL: 17.5 (ref 7–25)
CALCIUM SPEC-SCNC: 9.6 MG/DL (ref 8.6–10.5)
CHLORIDE SERPL-SCNC: 104 MMOL/L (ref 98–107)
CLARITY UR: CLEAR
CO2 SERPL-SCNC: 25.8 MMOL/L (ref 22–29)
COLOR UR: YELLOW
CREAT SERPL-MCNC: 0.63 MG/DL (ref 0.57–1)
DEPRECATED RDW RBC AUTO: 41.3 FL (ref 37–54)
EOSINOPHIL # BLD AUTO: 0.18 10*3/MM3 (ref 0–0.4)
EOSINOPHIL NFR BLD AUTO: 2.3 % (ref 0.3–6.2)
ERYTHROCYTE [DISTWIDTH] IN BLOOD BY AUTOMATED COUNT: 13.3 % (ref 12.3–15.4)
GFR SERPL CREATININE-BSD FRML MDRD: 118 ML/MIN/1.73
GLOBULIN UR ELPH-MCNC: 3.1 GM/DL
GLUCOSE SERPL-MCNC: 112 MG/DL (ref 65–99)
GLUCOSE UR STRIP-MCNC: NEGATIVE MG/DL
HCG SERPL QL: NEGATIVE
HCT VFR BLD AUTO: 44.8 % (ref 34–46.6)
HGB BLD-MCNC: 15 G/DL (ref 12–15.9)
HGB UR QL STRIP.AUTO: NEGATIVE
HOLD SPECIMEN: NORMAL
HYALINE CASTS UR QL AUTO: ABNORMAL /LPF
IMM GRANULOCYTES # BLD AUTO: 0.03 10*3/MM3 (ref 0–0.05)
IMM GRANULOCYTES NFR BLD AUTO: 0.4 % (ref 0–0.5)
KETONES UR QL STRIP: NEGATIVE
LEUKOCYTE ESTERASE UR QL STRIP.AUTO: ABNORMAL
LIPASE SERPL-CCNC: 18 U/L (ref 13–60)
LYMPHOCYTES # BLD AUTO: 2.44 10*3/MM3 (ref 0.7–3.1)
LYMPHOCYTES NFR BLD AUTO: 30.9 % (ref 19.6–45.3)
MCH RBC QN AUTO: 28.8 PG (ref 26.6–33)
MCHC RBC AUTO-ENTMCNC: 33.5 G/DL (ref 31.5–35.7)
MCV RBC AUTO: 86.2 FL (ref 79–97)
MONOCYTES # BLD AUTO: 0.37 10*3/MM3 (ref 0.1–0.9)
MONOCYTES NFR BLD AUTO: 4.7 % (ref 5–12)
NEUTROPHILS NFR BLD AUTO: 4.84 10*3/MM3 (ref 1.7–7)
NEUTROPHILS NFR BLD AUTO: 61.3 % (ref 42.7–76)
NITRITE UR QL STRIP: POSITIVE
NRBC BLD AUTO-RTO: 0 /100 WBC (ref 0–0.2)
PH UR STRIP.AUTO: 7.5 [PH] (ref 5–8)
PLATELET # BLD AUTO: 209 10*3/MM3 (ref 140–450)
PMV BLD AUTO: 10.3 FL (ref 6–12)
POTASSIUM SERPL-SCNC: 3.9 MMOL/L (ref 3.5–5.2)
PROT SERPL-MCNC: 7.8 G/DL (ref 6–8.5)
PROT UR QL STRIP: NEGATIVE
RBC # BLD AUTO: 5.2 10*6/MM3 (ref 3.77–5.28)
RBC # UR: ABNORMAL /HPF
REF LAB TEST METHOD: ABNORMAL
SODIUM SERPL-SCNC: 140 MMOL/L (ref 136–145)
SP GR UR STRIP: 1.02 (ref 1–1.03)
SQUAMOUS #/AREA URNS HPF: ABNORMAL /HPF
UROBILINOGEN UR QL STRIP: ABNORMAL
WBC # BLD AUTO: 7.89 10*3/MM3 (ref 3.4–10.8)
WBC UR QL AUTO: ABNORMAL /HPF
WHOLE BLOOD HOLD SPECIMEN: NORMAL
WHOLE BLOOD HOLD SPECIMEN: NORMAL

## 2021-08-26 PROCEDURE — 99283 EMERGENCY DEPT VISIT LOW MDM: CPT

## 2021-08-26 PROCEDURE — 85025 COMPLETE CBC W/AUTO DIFF WBC: CPT | Performed by: EMERGENCY MEDICINE

## 2021-08-26 PROCEDURE — 83690 ASSAY OF LIPASE: CPT | Performed by: EMERGENCY MEDICINE

## 2021-08-26 PROCEDURE — 81001 URINALYSIS AUTO W/SCOPE: CPT | Performed by: EMERGENCY MEDICINE

## 2021-08-26 PROCEDURE — 80053 COMPREHEN METABOLIC PANEL: CPT | Performed by: EMERGENCY MEDICINE

## 2021-08-26 PROCEDURE — 84703 CHORIONIC GONADOTROPIN ASSAY: CPT | Performed by: EMERGENCY MEDICINE

## 2021-08-26 RX ORDER — PHENOBARBITAL, HYOSCYAMINE SULFATE, ATROPINE SULFATE AND SCOPOLAMINE HYDROBROMIDE .0194; .1037; 16.2; .0065 MG/1; MG/1; MG/1; MG/1
2 TABLET ORAL ONCE
Status: COMPLETED | OUTPATIENT
Start: 2021-08-26 | End: 2021-08-26

## 2021-08-26 RX ORDER — PHENAZOPYRIDINE HYDROCHLORIDE 200 MG/1
200 TABLET, FILM COATED ORAL 3 TIMES DAILY PRN
Qty: 15 TABLET | Refills: 0 | Status: ON HOLD | OUTPATIENT
Start: 2021-08-26 | End: 2022-05-31

## 2021-08-26 RX ORDER — SODIUM CHLORIDE 0.9 % (FLUSH) 0.9 %
10 SYRINGE (ML) INJECTION AS NEEDED
Status: DISCONTINUED | OUTPATIENT
Start: 2021-08-26 | End: 2021-08-26 | Stop reason: HOSPADM

## 2021-08-26 RX ORDER — NITROFURANTOIN 25; 75 MG/1; MG/1
100 CAPSULE ORAL 2 TIMES DAILY
Qty: 20 CAPSULE | Refills: 0 | Status: ON HOLD | OUTPATIENT
Start: 2021-08-26 | End: 2022-06-01

## 2021-08-26 RX ORDER — NITROFURANTOIN 25; 75 MG/1; MG/1
100 CAPSULE ORAL ONCE
Status: COMPLETED | OUTPATIENT
Start: 2021-08-26 | End: 2021-08-26

## 2021-08-26 RX ORDER — DICYCLOMINE HCL 20 MG
20 TABLET ORAL EVERY 6 HOURS PRN
Qty: 30 TABLET | Refills: 0 | Status: ON HOLD | OUTPATIENT
Start: 2021-08-26 | End: 2022-05-31

## 2021-08-26 RX ORDER — LIDOCAINE HYDROCHLORIDE 20 MG/ML
15 SOLUTION OROPHARYNGEAL ONCE
Status: COMPLETED | OUTPATIENT
Start: 2021-08-26 | End: 2021-08-26

## 2021-08-26 RX ORDER — ALUMINA, MAGNESIA, AND SIMETHICONE 2400; 2400; 240 MG/30ML; MG/30ML; MG/30ML
15 SUSPENSION ORAL ONCE
Status: COMPLETED | OUTPATIENT
Start: 2021-08-26 | End: 2021-08-26

## 2021-08-26 RX ADMIN — PHENOBARBITAL, HYOSCYAMINE SULFATE, ATROPINE SULFATE, SCOPOLAMINE HYDROBROMIDE 32.4 MG: 16.2; .1037; .0194; .0065 TABLET ORAL at 16:12

## 2021-08-26 RX ADMIN — LIDOCAINE HYDROCHLORIDE 15 ML: 20 SOLUTION ORAL; TOPICAL at 16:12

## 2021-08-26 RX ADMIN — ALUMINUM HYDROXIDE, MAGNESIUM HYDROXIDE, AND DIMETHICONE 15 ML: 400; 400; 40 SUSPENSION ORAL at 16:12

## 2021-08-26 RX ADMIN — NITROFURANTOIN MONOHYDRATE/MACROCRYSTALLINE 100 MG: 25; 75 CAPSULE ORAL at 16:06

## 2021-08-26 NOTE — ED PROVIDER NOTES
Subjective     History provided by:  Patient   used: No    Abdominal Pain  Pain location:  Generalized  Pain quality: aching, cramping and dull    Pain radiates to:  Does not radiate  Pain severity:  Mild  Onset quality:  Gradual  Timing:  Constant  Progression:  Worsening  Chronicity:  New  Context: not alcohol use, not awakening from sleep, not diet changes, not eating, not laxative use, not medication withdrawal, not previous surgeries, not recent illness, not recent sexual activity, not recent travel, not retching, not sick contacts, not suspicious food intake and not trauma    Relieved by:  Nothing  Worsened by:  Nothing  Ineffective treatments:  None tried  Associated symptoms: no anorexia, no belching, no chest pain, no chills, no constipation, no cough, no diarrhea, no dysuria, no fatigue, no fever, no flatus, no hematemesis, no hematochezia, no hematuria, no melena, no nausea, no shortness of breath, no sore throat and no vomiting    Risk factors: no alcohol abuse, no aspirin use, not elderly, has not had multiple surgeries, no NSAID use, not obese, not pregnant and no recent hospitalization        Review of Systems   Constitutional: Negative for activity change, appetite change, chills, diaphoresis, fatigue and fever.   HENT: Negative for congestion, ear pain and sore throat.    Eyes: Negative for redness.   Respiratory: Negative for cough, chest tightness, shortness of breath and wheezing.    Cardiovascular: Negative for chest pain, palpitations and leg swelling.   Gastrointestinal: Positive for abdominal pain. Negative for anorexia, constipation, diarrhea, flatus, hematemesis, hematochezia, melena, nausea and vomiting.   Genitourinary: Negative for dysuria, hematuria and urgency.   Musculoskeletal: Negative for arthralgias, back pain, myalgias and neck pain.   Skin: Negative for pallor, rash and wound.   Neurological: Negative for dizziness, speech difficulty, weakness and headaches.    Psychiatric/Behavioral: Negative for agitation, behavioral problems, confusion and decreased concentration.   All other systems reviewed and are negative.      Past Medical History:   Diagnosis Date   • Anxiety    • Depression    • H/O seasonal allergies    • Psoriasis    • Psoriasis    • Psoriasis    • Urinary tract infection        No Known Allergies    Past Surgical History:   Procedure Laterality Date   • DILATATION AND CURETTAGE N/A 9/1/2017    Procedure: DILATATION AND CURETTAGE;  Surgeon: Juan Pablo Wood MD;  Location:  [facility];  Service:    • HEAD/NECK LESION/CYST EXCISION N/A 6/12/2020    Procedure: FACIAL LESION/CYST EXCISION;  Surgeon: Jay Cohen MD;  Location:  [facility];  Service: General;  Laterality: N/A;       Family History   Problem Relation Age of Onset   • Diabetes Father        Social History     Socioeconomic History   • Marital status: Single     Spouse name: Not on file   • Number of children: Not on file   • Years of education: Not on file   • Highest education level: Not on file   Tobacco Use   • Smoking status: Current Every Day Smoker     Packs/day: 0.50     Years: 4.00     Pack years: 2.00     Types: Cigarettes   • Smokeless tobacco: Never Used   • Tobacco comment: PT STATES IS AROUND SMOKING EVERY DAY   Substance and Sexual Activity   • Alcohol use: No   • Drug use: No   • Sexual activity: Defer           Objective   Physical Exam  Vitals and nursing note reviewed.   Constitutional:       General: She is not in acute distress.     Appearance: Normal appearance. She is well-developed. She is not toxic-appearing or diaphoretic.   HENT:      Head: Normocephalic and atraumatic.      Right Ear: External ear normal.      Left Ear: External ear normal.      Nose: Nose normal.      Mouth/Throat:      Pharynx: No oropharyngeal exudate.      Tonsils: No tonsillar exudate.   Eyes:      General: Lids are normal.      Conjunctiva/sclera: Conjunctivae normal.      Pupils: Pupils are equal,  round, and reactive to light.   Neck:      Thyroid: No thyromegaly.   Cardiovascular:      Rate and Rhythm: Normal rate and regular rhythm.      Pulses: Normal pulses.      Heart sounds: Normal heart sounds, S1 normal and S2 normal.   Pulmonary:      Effort: Pulmonary effort is normal. No tachypnea or respiratory distress.      Breath sounds: Normal breath sounds. No decreased breath sounds, wheezing or rales.   Chest:      Chest wall: No tenderness.   Abdominal:      General: Bowel sounds are normal. There is no distension.      Palpations: Abdomen is soft.      Tenderness: There is generalized abdominal tenderness. There is left CVA tenderness. There is no guarding or rebound.   Musculoskeletal:         General: No tenderness or deformity. Normal range of motion.      Cervical back: Full passive range of motion without pain, normal range of motion and neck supple.   Lymphadenopathy:      Cervical: No cervical adenopathy.   Skin:     General: Skin is warm and dry.      Coloration: Skin is not pale.      Findings: No erythema or rash.   Neurological:      Mental Status: She is alert and oriented to person, place, and time.      GCS: GCS eye subscore is 4. GCS verbal subscore is 5. GCS motor subscore is 6.      Cranial Nerves: No cranial nerve deficit.      Sensory: No sensory deficit.   Psychiatric:         Speech: Speech normal.         Behavior: Behavior normal.         Thought Content: Thought content normal.         Judgment: Judgment normal.         Procedures           ED Course                                           MDM  Number of Diagnoses or Management Options  Bacterial UTI: new and requires workup     Amount and/or Complexity of Data Reviewed  Clinical lab tests: reviewed and ordered  Tests in the radiology section of CPT®: ordered and reviewed  Tests in the medicine section of CPT®: ordered and reviewed  Review and summarize past medical records: yes  Independent visualization of images, tracings, or  specimens: yes    Risk of Complications, Morbidity, and/or Mortality  Presenting problems: moderate  Diagnostic procedures: moderate  Management options: moderate    Patient Progress  Patient progress: stable      Final diagnoses:   Bacterial UTI       ED Disposition  ED Disposition     ED Disposition Condition Comment    Discharge Stable           Bonnie Dean, APRN  [address]  [place] [address]  [phone]    Schedule an appointment as soon as possible for a visit in 1 day  REEVALUATE         Medication List      New Prescriptions    dicyclomine 20 MG tablet  Commonly known as: BENTYL  Take 1 tablet by mouth Every 6 (Six) Hours As Needed (spasm).     nitrofurantoin (macrocrystal-monohydrate) 100 MG capsule  Commonly known as: MACROBID  Take 1 capsule by mouth 2 (Two) Times a Day.     phenazopyridine 200 MG tablet  Commonly known as: PYRIDIUM  Take 1 tablet by mouth 3 (Three) Times a Day As Needed for Bladder Spasms.           Where to Get Your Medications      These medications were sent to [facility]  [address] [place] [address]    Hours: 8AM-6PM Mon-Fri Phone: [phone]   · dicyclomine 20 MG tablet  · nitrofurantoin (macrocrystal-monohydrate) 100 MG capsule  · phenazopyridine 200 MG tablet          Mike Anguiano MD  08/26/21 2911

## 2022-02-28 LAB
EXTERNAL ABO GROUPING: NORMAL
EXTERNAL ANTIBODY SCREEN: NEGATIVE
EXTERNAL CHLAMYDIA SCREEN: NEGATIVE
EXTERNAL GONORRHEA SCREEN: NEGATIVE
EXTERNAL HEPATITIS B SURFACE ANTIGEN: NEGATIVE
EXTERNAL RH FACTOR: NEGATIVE
EXTERNAL RUBELLA QUALITATIVE: NORMAL
EXTERNAL SYPHILIS RPR SCREEN: NORMAL
HIV1 P24 AG SERPL QL IA: NORMAL

## 2022-05-05 LAB — EXTERNAL GTT 1 HOUR: 76

## 2022-05-31 ENCOUNTER — HOSPITAL ENCOUNTER (OUTPATIENT)
Facility: HOSPITAL | Age: 22
Setting detail: OBSERVATION
Discharge: HOME | End: 2022-06-01
Attending: OBSTETRICS & GYNECOLOGY | Admitting: OBSTETRICS & GYNECOLOGY

## 2022-05-31 PROBLEM — N39.0 UTI (URINARY TRACT INFECTION): Status: ACTIVE | Noted: 2022-05-31

## 2022-05-31 LAB
AMPHET+METHAMPHET UR QL: NEGATIVE
AMPHETAMINES UR QL: NEGATIVE
BACTERIA UR QL AUTO: ABNORMAL /HPF
BARBITURATES UR QL SCN: NEGATIVE
BASOPHILS # BLD AUTO: 0.02 10*3/MM3 (ref 0–0.2)
BASOPHILS NFR BLD AUTO: 0.2 % (ref 0–1.5)
BENZODIAZ UR QL SCN: NEGATIVE
BILIRUB UR QL STRIP: NEGATIVE
BUPRENORPHINE SERPL-MCNC: NEGATIVE NG/ML
CANNABINOIDS SERPL QL: NEGATIVE
CLARITY UR: ABNORMAL
COCAINE UR QL: NEGATIVE
COLOR UR: ABNORMAL
DEPRECATED RDW RBC AUTO: 42.3 FL (ref 37–54)
EOSINOPHIL # BLD AUTO: 0.09 10*3/MM3 (ref 0–0.4)
EOSINOPHIL NFR BLD AUTO: 0.7 % (ref 0.3–6.2)
ERYTHROCYTE [DISTWIDTH] IN BLOOD BY AUTOMATED COUNT: 13.7 % (ref 12.3–15.4)
GLUCOSE UR STRIP-MCNC: NEGATIVE MG/DL
HCT VFR BLD AUTO: 32.6 % (ref 34–46.6)
HGB BLD-MCNC: 11.2 G/DL (ref 12–15.9)
HGB UR QL STRIP.AUTO: ABNORMAL
HYALINE CASTS UR QL AUTO: ABNORMAL /LPF
IMM GRANULOCYTES # BLD AUTO: 0.04 10*3/MM3 (ref 0–0.05)
IMM GRANULOCYTES NFR BLD AUTO: 0.3 % (ref 0–0.5)
KETONES UR QL STRIP: ABNORMAL
LEUKOCYTE ESTERASE UR QL STRIP.AUTO: ABNORMAL
LYMPHOCYTES # BLD AUTO: 2.27 10*3/MM3 (ref 0.7–3.1)
LYMPHOCYTES NFR BLD AUTO: 17.6 % (ref 19.6–45.3)
MCH RBC QN AUTO: 29.5 PG (ref 26.6–33)
MCHC RBC AUTO-ENTMCNC: 34.4 G/DL (ref 31.5–35.7)
MCV RBC AUTO: 85.8 FL (ref 79–97)
METHADONE UR QL SCN: NEGATIVE
MONOCYTES # BLD AUTO: 0.49 10*3/MM3 (ref 0.1–0.9)
MONOCYTES NFR BLD AUTO: 3.8 % (ref 5–12)
NEUTROPHILS NFR BLD AUTO: 10 10*3/MM3 (ref 1.7–7)
NEUTROPHILS NFR BLD AUTO: 77.4 % (ref 42.7–76)
NITRITE UR QL STRIP: NEGATIVE
NRBC BLD AUTO-RTO: 0 /100 WBC (ref 0–0.2)
OPIATES UR QL: NEGATIVE
OXYCODONE UR QL SCN: NEGATIVE
PCP UR QL SCN: NEGATIVE
PH UR STRIP.AUTO: 7 [PH] (ref 5–8)
PLATELET # BLD AUTO: 204 10*3/MM3 (ref 140–450)
PMV BLD AUTO: 10.5 FL (ref 6–12)
PROPOXYPH UR QL: NEGATIVE
PROT UR QL STRIP: ABNORMAL
RBC # BLD AUTO: 3.8 10*6/MM3 (ref 3.77–5.28)
RBC # UR STRIP: ABNORMAL /HPF
REF LAB TEST METHOD: ABNORMAL
SARS-COV-2 RNA RESP QL NAA+PROBE: NOT DETECTED
SP GR UR STRIP: 1.02 (ref 1–1.03)
SQUAMOUS #/AREA URNS HPF: ABNORMAL /HPF
TRICYCLICS UR QL SCN: NEGATIVE
UROBILINOGEN UR QL STRIP: ABNORMAL
WBC # UR STRIP: ABNORMAL /HPF
WBC NRBC COR # BLD: 12.91 10*3/MM3 (ref 3.4–10.8)

## 2022-05-31 PROCEDURE — P9612 CATHETERIZE FOR URINE SPEC: HCPCS

## 2022-05-31 PROCEDURE — G0378 HOSPITAL OBSERVATION PER HR: HCPCS

## 2022-05-31 PROCEDURE — 80306 DRUG TEST PRSMV INSTRMNT: CPT | Performed by: OBSTETRICS & GYNECOLOGY

## 2022-05-31 PROCEDURE — 59025 FETAL NON-STRESS TEST: CPT

## 2022-05-31 PROCEDURE — 87086 URINE CULTURE/COLONY COUNT: CPT | Performed by: OBSTETRICS & GYNECOLOGY

## 2022-05-31 PROCEDURE — 81001 URINALYSIS AUTO W/SCOPE: CPT | Performed by: OBSTETRICS & GYNECOLOGY

## 2022-05-31 PROCEDURE — 85025 COMPLETE CBC W/AUTO DIFF WBC: CPT | Performed by: OBSTETRICS & GYNECOLOGY

## 2022-05-31 PROCEDURE — 25010000002 CEFTRIAXONE PER 250 MG: Performed by: OBSTETRICS & GYNECOLOGY

## 2022-05-31 PROCEDURE — G0463 HOSPITAL OUTPT CLINIC VISIT: HCPCS

## 2022-05-31 PROCEDURE — 87635 SARS-COV-2 COVID-19 AMP PRB: CPT | Performed by: OBSTETRICS & GYNECOLOGY

## 2022-05-31 PROCEDURE — C9803 HOPD COVID-19 SPEC COLLECT: HCPCS

## 2022-05-31 PROCEDURE — 96361 HYDRATE IV INFUSION ADD-ON: CPT

## 2022-05-31 PROCEDURE — 96360 HYDRATION IV INFUSION INIT: CPT

## 2022-05-31 PROCEDURE — 36415 COLL VENOUS BLD VENIPUNCTURE: CPT | Performed by: OBSTETRICS & GYNECOLOGY

## 2022-05-31 RX ORDER — GUAIFENESIN/DEXTROMETHORPHAN 100-10MG/5
5 SYRUP ORAL EVERY 4 HOURS PRN
Status: DISCONTINUED | OUTPATIENT
Start: 2022-05-31 | End: 2022-06-01 | Stop reason: HOSPADM

## 2022-05-31 RX ORDER — ACETAMINOPHEN 325 MG/1
650 TABLET ORAL EVERY 4 HOURS PRN
Status: DISCONTINUED | OUTPATIENT
Start: 2022-05-31 | End: 2022-05-31

## 2022-05-31 RX ORDER — CETIRIZINE HYDROCHLORIDE 10 MG/1
10 TABLET ORAL DAILY
Status: DISCONTINUED | OUTPATIENT
Start: 2022-05-31 | End: 2022-06-01 | Stop reason: HOSPADM

## 2022-05-31 RX ORDER — SODIUM CHLORIDE 9 MG/ML
150 INJECTION, SOLUTION INTRAVENOUS CONTINUOUS
Status: DISCONTINUED | OUTPATIENT
Start: 2022-05-31 | End: 2022-06-01 | Stop reason: HOSPADM

## 2022-05-31 RX ORDER — FAMOTIDINE 20 MG/1
20 TABLET, FILM COATED ORAL 2 TIMES DAILY PRN
Status: DISCONTINUED | OUTPATIENT
Start: 2022-05-31 | End: 2022-06-01 | Stop reason: HOSPADM

## 2022-05-31 RX ORDER — HYDROXYZINE 50 MG/1
50 TABLET, FILM COATED ORAL NIGHTLY PRN
Status: DISCONTINUED | OUTPATIENT
Start: 2022-05-31 | End: 2022-06-01 | Stop reason: HOSPADM

## 2022-05-31 RX ORDER — ACETAMINOPHEN 325 MG/1
650 TABLET ORAL EVERY 4 HOURS PRN
Status: DISCONTINUED | OUTPATIENT
Start: 2022-05-31 | End: 2022-06-01 | Stop reason: HOSPADM

## 2022-05-31 RX ORDER — SODIUM CHLORIDE 0.9 % (FLUSH) 0.9 %
10 SYRINGE (ML) INJECTION AS NEEDED
Status: DISCONTINUED | OUTPATIENT
Start: 2022-05-31 | End: 2022-06-01 | Stop reason: HOSPADM

## 2022-05-31 RX ORDER — SODIUM CHLORIDE 0.9 % (FLUSH) 0.9 %
10 SYRINGE (ML) INJECTION EVERY 12 HOURS SCHEDULED
Status: DISCONTINUED | OUTPATIENT
Start: 2022-05-31 | End: 2022-06-01 | Stop reason: HOSPADM

## 2022-05-31 RX ADMIN — ACETAMINOPHEN 325MG 650 MG: 325 TABLET ORAL at 20:16

## 2022-05-31 RX ADMIN — SODIUM CHLORIDE 1000 ML: 9 INJECTION, SOLUTION INTRAVENOUS at 19:55

## 2022-05-31 RX ADMIN — CETIRIZINE HYDROCHLORIDE 10 MG: 10 TABLET, FILM COATED ORAL at 21:38

## 2022-05-31 RX ADMIN — CEFTRIAXONE 2 G: 2 INJECTION, POWDER, FOR SOLUTION INTRAMUSCULAR; INTRAVENOUS at 21:38

## 2022-05-31 RX ADMIN — GUAIFENESIN AND DEXTROMETHORPHAN 5 ML: 100; 10 SYRUP ORAL at 23:44

## 2022-05-31 RX ADMIN — SODIUM CHLORIDE 150 ML/HR: 9 INJECTION, SOLUTION INTRAVENOUS at 20:25

## 2022-05-31 RX ADMIN — HYDROXYZINE HYDROCHLORIDE 50 MG: 50 TABLET ORAL at 23:45

## 2022-06-01 ENCOUNTER — HOSPITAL ENCOUNTER (OUTPATIENT)
Facility: HOSPITAL | Age: 22
End: 2022-06-01
Attending: OBSTETRICS & GYNECOLOGY | Admitting: OBSTETRICS & GYNECOLOGY

## 2022-06-01 VITALS
BODY MASS INDEX: 29.52 KG/M2 | HEIGHT: 65 IN | OXYGEN SATURATION: 99 % | SYSTOLIC BLOOD PRESSURE: 110 MMHG | DIASTOLIC BLOOD PRESSURE: 54 MMHG | HEART RATE: 78 BPM | RESPIRATION RATE: 20 BRPM | TEMPERATURE: 98 F | WEIGHT: 177.2 LBS

## 2022-06-01 LAB — BACTERIA SPEC AEROBE CULT: NORMAL

## 2022-06-01 PROCEDURE — 96361 HYDRATE IV INFUSION ADD-ON: CPT

## 2022-06-01 PROCEDURE — G0378 HOSPITAL OBSERVATION PER HR: HCPCS

## 2022-06-01 RX ORDER — PRENATAL VIT/IRON FUM/FOLIC AC 27MG-0.8MG
1 TABLET ORAL DAILY
Status: CANCELLED | OUTPATIENT
Start: 2022-06-01

## 2022-06-01 RX ORDER — CEPHALEXIN 500 MG/1
500 CAPSULE ORAL 2 TIMES DAILY
Qty: 20 CAPSULE | Refills: 0 | Status: ON HOLD | OUTPATIENT
Start: 2022-06-01 | End: 2022-06-29

## 2022-06-01 RX ADMIN — SODIUM CHLORIDE 150 ML/HR: 9 INJECTION, SOLUTION INTRAVENOUS at 02:17

## 2022-06-01 RX ADMIN — GUAIFENESIN AND DEXTROMETHORPHAN 5 ML: 100; 10 SYRUP ORAL at 04:15

## 2022-06-01 NOTE — PLAN OF CARE
Goal Outcome Evaluation:   NST REACTIVE. PAIN EASED WITH TYLENOL PO. IV ABX STARTED, ON OBSERVATION.

## 2022-06-01 NOTE — DISCHARGE SUMMARY
Discharge Summary    Admit Date: 2022  7:24 PM    Admit Diagnosis: Abdominal pain affecting pregnancy [O26.899, R10.9]  UTI (urinary tract infection) [N39.0]    Date of Discharge:  2022    Discharge Diagnosis: Same        Hospital Course  Patient is a 21 y.o. female, G 3, P 1102. Patient was admitted due to cramping and back pain with urinary tract infection.. Patient doing better after 2 doses of Rocephin. Symptoms have improved. Patient tolerating a regular diet and ambulating without difficulty. Will discharge to home today on Keflex.         Vital Signs  Temp:  [98 °F (36.7 °C)-98.2 °F (36.8 °C)] 98 °F (36.7 °C)  Heart Rate:  [78-85] 78  Resp:  [18-20] 20  BP: (110-137)/(54-61) 110/54    Review of Systems    The following systems were reviewed and negative; Contraction, LOF, VB,  HA,  scotomata, N/V      The following systems were reviewed and positive; Good fetal movement           Physical Exam:      General Appearance:   NAD   Head:   NC   Eyes:           PEARLA   Ears:  deferred   Throat: deferred   Neck: No JVD   Back:    No CVAT   Lungs:    CTAB    Heart:   RRR    Breast Exam:  deferred   Abdomen:    gravid uterus.  Non-tender   Genitalia:   deferred   Extremities:  No c/c/e   Pulses:  Normal                    A/P:  1.  Genitourinary infection in pregnancy in the second trimester-patient states she feels much better after IV Rocephin.  We will send home on Keflex with follow-up in the office next week as scheduled.  She is to aggressively hydrate at home to flush kidneys.  2.  Reassuring fetal tracing.  Follow-up in office as scheduled.  Will discharge home.  3.  History of  labor initially started on [place] states she only took 4 injections and is been out of her medications we will have her contact the office to discuss this.        Condition on Discharge:  Stable    Urine Output Good    Discharge Diet: Regular    Follow-up Appointments  Patient will follow up in clinic this week.         Zoe Arriaga DO  06/01/22  09:37 EDT

## 2022-06-01 NOTE — PLAN OF CARE
Goal Outcome Evaluation:  Plan of Care Reviewed With: patient        Progress: improving  Outcome Evaluation: IV WAS REMOVED WITH NO  REDNESS NOTED TO SITE.  PT GIVEN DISCHARGE INSTRUCTIONS AND VERBALIZED UNDERSTANDING.

## 2022-06-01 NOTE — NON STRESS TEST
Bianca Nieves, a  at 26w5d with an JAME of 2022, by Ultrasound, was seen at [facility] for a nonstress test.    Chief Complaint   Patient presents with   • Abdominal Pain   • Back Pain   • Urinary Tract Infection     PRESENTS TO L&D WITH C/O LOWER ABD PAIN, BACK PAIN, AND UTI THAT DID NOT IMPROVE WITH FULL COURSE OF MACROBID. DENIES CTX, VB, OR LOF. STATES BABY IS MOVING WELL.        Patient Active Problem List   Diagnosis   • Right upper quadrant abdominal pain   • Leukocytosis   • Abdominal pain affecting pregnancy   • Pregnancy   • Abdominal pain during pregnancy   • Visit for wound check   • UTI (urinary tract infection)       Start Time:   Stop Time:

## 2022-06-01 NOTE — H&P
OB Progress Note      Hospital Course: G 3, now P 1102. Patient was admitted on 5/31/2022  7:24 PM with IUP at 26w6d weeks gestation secondary to Abdominal pain affecting pregnancy [O26.899, R10.9]  UTI (urinary tract infection) [N39.0].  Patient was given Macrobid which she states she completed.  Urine culture states that she has an E. coli infection sensitive to Macrobid.  It is intermediate resistance to ceftriaxone and resistant to Augmentin.  Sensitive to cefepime. patient states she is feeling better today after 2 doses of Rocephin.  She denies nausea and vomiting she has had no fever and she is eating and drinking without difficulty.      Signs in last 24 hours:    Vital Signs Range for the last 24 hours              Temp:  [98 °F (36.7 °C)-98.2 °F (36.8 °C)] 98 °F (36.7 °C)  Heart Rate:  [78-85] 78  Resp:  [18-20] 20  BP: (110-137)/(54-61) 110/54     Radiology     Imaging Results (Last 24 Hours)     ** No results found for the last 24 hours. **           Labs     Lab Results (last 24 hours)     Procedure Component Value Units Date/Time    COVID PRE-OP / PRE-PROCEDURE SCREENING ORDER (NO ISOLATION) - Swab, Nasopharynx [[id number]]  (Normal) Collected: 05/31/22 2216    Specimen: Swab from Nasopharynx Updated: 05/31/22 2245    Narrative:      The following orders were created for panel order COVID PRE-OP / PRE-PROCEDURE SCREENING ORDER (NO ISOLATION) - Swab, Nasopharynx.  Procedure                               Abnormality         Status                     ---------                               -----------         ------                     COVID-19,CEPHEID/JOSE,CO...[[id number]]  Normal              Final result                 Please view results for these tests on the individual orders.    COVID-19,CEPHEID/JOSE,COR/BOBBY/PAD/ERNESTINA IN-HOUSE(OR EMERGENT/ADD-ON),NP SWAB IN TRANSPORT MEDIA 3-4 HR TAT, RT-PCR - Swab, Nasopharynx [[id number]]  (Normal) Collected: 05/31/22 2216    Specimen: Swab from Nasopharynx  Updated: 05/31/22 2245     COVID19 Not Detected    Narrative:      Fact sheet for providers: https://www.fda.gov/media/515403/download     Fact sheet for patients: https://www.fda.gov/media/093035/download  Fact sheet for providers: https://www.fda.gov/media/479208/download    Fact sheet for patients: https://www.fda.gov/media/213801/download    Test performed by PCR.    Chlamydia trachomatis, Neisseria gonorrhoeae, PCR w/ confirmation - Swab, Vagina [[id number]] Resulted: 02/28/22    Specimen: Swab from Vagina Updated: 05/31/22 2244     External Chlamydia Screen Negative    Gonorrhea Screen - Swab, [[id number]] Resulted: 02/28/22    Specimen: Swab Updated: 05/31/22 2244     External Gonorrhea Screen Negative    GTT 1 Hour [[id number]] Resulted: 05/05/22    Specimen: Blood Updated: 05/31/22 2244     External GTT 1 Hour 76     Comment: 1 HR       Hepatitis B Surface Antigen [[id number]] Resulted: 02/28/22    Specimen: Blood Updated: 05/31/22 2244     External Hepatitis B Surface Ag Negative    RPR [[id number]] Resulted: 02/28/22    Specimen: Blood Updated: 05/31/22 2244     External RPR Non-Reactive    Rubella Antibody, IgG [[id number]] Resulted: 02/28/22    Specimen: Blood Updated: 05/31/22 2244     External Rubella Qual Immune    Urinalysis With Culture If Indicated - Urine, Catheter In/Out [[id number]]  (Abnormal) Collected: 05/31/22 1940    Specimen: Urine, Catheter In/Out Updated: 05/31/22 2016     Color, UA Dark Yellow     Appearance, UA Turbid     pH, UA 7.0     Specific Gravity, UA 1.021     Glucose, UA Negative     Ketones, UA Trace     Bilirubin, UA Negative     Blood, UA Moderate (2+)     Protein, UA >=300 mg/dL (3+)     Leuk Esterase, UA Moderate (2+)     Nitrite, UA Negative     Urobilinogen, UA 0.2 E.U./dL    Narrative:      In absence of clinical symptoms, the presence of pyuria, bacteria, and/or nitrites on the urinalysis result does not correlate with infection.    Urinalysis, Microscopic Only - Urine,  Catheter In/Out [[id number]]  (Abnormal) Collected: 05/31/22 1940    Specimen: Urine, Catheter In/Out Updated: 05/31/22 2016     RBC, UA Too Numerous to Count /HPF      WBC, UA Too Numerous to Count /HPF      Bacteria, UA None Seen /HPF      Squamous Epithelial Cells, UA 0-2 /HPF      Hyaline Casts, UA None Seen /LPF      Methodology Automated Microscopy    Urine Drug Screen - Urine, Catheter [[id number]]  (Normal) Collected: 05/31/22 1940    Specimen: Urine, Catheter Updated: 05/31/22 2012     THC, Screen, Urine Negative     Phencyclidine (PCP), Urine Negative     Cocaine Screen, Urine Negative     Methamphetamine, Ur Negative     Opiate Screen Negative     Amphetamine Screen, Urine Negative     Benzodiazepine Screen, Urine Negative     Tricyclic Antidepressants Screen Negative     Methadone Screen, Urine Negative     Barbiturates Screen, Urine Negative     Oxycodone Screen, Urine Negative     Propoxyphene Screen Negative     Buprenorphine, Screen, Urine Negative    Narrative:      Cutoff For Drugs Screened:    Amphetamines               500 ng/ml  Barbiturates               200 ng/ml  Benzodiazepines            150 ng/ml  Cocaine                    150 ng/ml  Methadone                  200 ng/ml  Opiates                    100 ng/ml  Phencyclidine               25 ng/ml  THC                            50 ng/ml  Methamphetamine            500 ng/ml  Tricyclic Antidepressants  300 ng/ml  Oxycodone                  100 ng/ml  Propoxyphene               300 ng/ml  Buprenorphine               10 ng/ml    The normal value for all drugs tested is negative. This report includes unconfirmed screening results, with the cutoff values listed, to be used for medical treatment purposes only.  Unconfirmed results must not be used for non-medical purposes such as employment or legal testing.  Clinical consideration should be applied to any drug of abuse test, particularly when unconfirmed results are used.      Urine Culture -  Urine, Urine, Catheter In/Out [[id number]] Collected: 05/31/22 1940    Specimen: Urine, Catheter In/Out Updated: 05/31/22 2005    CBC & Differential [[id number]]  (Abnormal) Collected: 05/31/22 1946    Specimen: Blood Updated: 05/31/22 1959    Narrative:      The following orders were created for panel order CBC & Differential.  Procedure                               Abnormality         Status                     ---------                               -----------         ------                     CBC Auto Differential[[id number]]        Abnormal            Final result                 Please view results for these tests on the individual orders.    CBC Auto Differential [[id number]]  (Abnormal) Collected: 05/31/22 1946    Specimen: Blood Updated: 05/31/22 1959     WBC 12.91 10*3/mm3      RBC 3.80 10*6/mm3      Hemoglobin 11.2 g/dL      Hematocrit 32.6 %      MCV 85.8 fL      MCH 29.5 pg      MCHC 34.4 g/dL      RDW 13.7 %      RDW-SD 42.3 fl      MPV 10.5 fL      Platelets 204 10*3/mm3      Neutrophil % 77.4 %      Lymphocyte % 17.6 %      Monocyte % 3.8 %      Eosinophil % 0.7 %      Basophil % 0.2 %      Immature Grans % 0.3 %      Neutrophils, Absolute 10.00 10*3/mm3      Lymphocytes, Absolute 2.27 10*3/mm3      Monocytes, Absolute 0.49 10*3/mm3      Eosinophils, Absolute 0.09 10*3/mm3      Basophils, Absolute 0.02 10*3/mm3      Immature Grans, Absolute 0.04 10*3/mm3      nRBC 0.0 /100 WBC             Review of Systems    The following systems were reviewed and negative; Fever, Chills, Contractions,  LOF, Vaginal bleeding, decreased fetal movement      The following systems were reviewed and positive; cramping and back pain has resolved.    Physical Exam:  General:   NAD, A&O x 3  Chest: CTAB no w/r/r  CV: RRR no murmurs  Abd: Soft.  Gravid uterus and NT.    Back: No CVAT  : cervix: Deferred  Ext: no c/c/e    NST: Reactive for gestational age                A/P:  1.  Genitourinary infection in pregnancy in the  second trimester-patient states she feels much better after IV Rocephin.  We will send home on Keflex with follow-up in the office next week as scheduled.  She is to aggressively hydrate at home to flush kidneys.  2.  Reassuring fetal tracing.  Follow-up in office as scheduled.  Will discharge home.      Zoe Arriaga DO  06/01/22  09:33 EDT

## 2022-06-23 ENCOUNTER — PATIENT OUTREACH (OUTPATIENT)
Dept: LABOR AND DELIVERY | Facility: HOSPITAL | Age: 22
End: 2022-06-23

## 2022-06-23 ENCOUNTER — REFERRAL TRIAGE (OUTPATIENT)
Dept: LABOR AND DELIVERY | Facility: HOSPITAL | Age: 22
End: 2022-06-23

## 2022-06-23 NOTE — OUTREACH NOTE
[facility]  Unable to Reach       Questions/Answers    Flowsheet Row Responses   Pending Outreach Confirm Patient Interest   Call Attempt First   Outcome No answer/busy          No voice mail available.    Eli Bernal RN  Maternity Nurse Navigator    6/23/2022, 14:22 EDT

## 2022-06-28 ENCOUNTER — HOSPITAL ENCOUNTER (OUTPATIENT)
Facility: HOSPITAL | Age: 22
Setting detail: OBSERVATION
Discharge: HOME | End: 2022-06-30
Attending: OBSTETRICS & GYNECOLOGY | Admitting: OBSTETRICS & GYNECOLOGY

## 2022-06-28 ENCOUNTER — PATIENT OUTREACH (OUTPATIENT)
Dept: LABOR AND DELIVERY | Facility: HOSPITAL | Age: 22
End: 2022-06-28

## 2022-06-28 LAB
ABO GROUP BLD: NORMAL
AMPHET+METHAMPHET UR QL: NEGATIVE
AMPHETAMINES UR QL: NEGATIVE
BARBITURATES UR QL SCN: NEGATIVE
BENZODIAZ UR QL SCN: NEGATIVE
BLD GP AB SCN SERPL QL: NEGATIVE
BUPRENORPHINE SERPL-MCNC: NEGATIVE NG/ML
CANNABINOIDS SERPL QL: NEGATIVE
COCAINE UR QL: NEGATIVE
METHADONE UR QL SCN: NEGATIVE
NUMBER OF DOSES: NORMAL
OPIATES UR QL: NEGATIVE
OXYCODONE UR QL SCN: NEGATIVE
PCP UR QL SCN: NEGATIVE
PROPOXYPH UR QL: NEGATIVE
RH BLD: NEGATIVE
SARS-COV-2 RNA RESP QL NAA+PROBE: NOT DETECTED
T&S EXPIRATION DATE: NORMAL
TRICYCLICS UR QL SCN: NEGATIVE

## 2022-06-28 PROCEDURE — 86901 BLOOD TYPING SEROLOGIC RH(D): CPT | Performed by: OBSTETRICS & GYNECOLOGY

## 2022-06-28 PROCEDURE — G0378 HOSPITAL OBSERVATION PER HR: HCPCS

## 2022-06-28 PROCEDURE — 86900 BLOOD TYPING SEROLOGIC ABO: CPT | Performed by: OBSTETRICS & GYNECOLOGY

## 2022-06-28 PROCEDURE — 25010000002 BETAMETHASONE ACET & SOD PHOS PER 4 MG: Performed by: OBSTETRICS & GYNECOLOGY

## 2022-06-28 PROCEDURE — 80306 DRUG TEST PRSMV INSTRMNT: CPT | Performed by: OBSTETRICS & GYNECOLOGY

## 2022-06-28 PROCEDURE — 25010000002 RHO D IMMUNE GLOBULIN 1500 UNITS SOLUTION PREFILLED SYRINGE: Performed by: OBSTETRICS & GYNECOLOGY

## 2022-06-28 PROCEDURE — 36415 COLL VENOUS BLD VENIPUNCTURE: CPT | Performed by: OBSTETRICS & GYNECOLOGY

## 2022-06-28 PROCEDURE — 87635 SARS-COV-2 COVID-19 AMP PRB: CPT | Performed by: OBSTETRICS & GYNECOLOGY

## 2022-06-28 PROCEDURE — C9803 HOPD COVID-19 SPEC COLLECT: HCPCS

## 2022-06-28 PROCEDURE — 87081 CULTURE SCREEN ONLY: CPT | Performed by: OBSTETRICS & GYNECOLOGY

## 2022-06-28 PROCEDURE — 86850 RBC ANTIBODY SCREEN: CPT | Performed by: OBSTETRICS & GYNECOLOGY

## 2022-06-28 PROCEDURE — 96372 THER/PROPH/DIAG INJ SC/IM: CPT

## 2022-06-28 RX ORDER — BETAMETHASONE SODIUM PHOSPHATE AND BETAMETHASONE ACETATE 3; 3 MG/ML; MG/ML
12 INJECTION, SUSPENSION INTRA-ARTICULAR; INTRALESIONAL; INTRAMUSCULAR; SOFT TISSUE EVERY 24 HOURS
Status: DISCONTINUED | OUTPATIENT
Start: 2022-06-28 | End: 2022-06-30 | Stop reason: HOSPADM

## 2022-06-28 RX ADMIN — HUMAN RHO(D) IMMUNE GLOBULIN 1500 UNITS: 300 INJECTION, SOLUTION INTRAMUSCULAR at 23:40

## 2022-06-28 RX ADMIN — BETAMETHASONE SODIUM PHOSPHATE AND BETAMETHASONE ACETATE 12 MG: 3; 3 INJECTION, SUSPENSION INTRA-ARTICULAR; INTRALESIONAL; INTRAMUSCULAR at 23:36

## 2022-06-28 RX ADMIN — MICONAZOLE NITRATE 200 MG: 200 SUPPOSITORY VAGINAL at 23:35

## 2022-06-28 NOTE — OUTREACH NOTE
[facility]  Intake    Current Estimated Gestational Age: 30w5d    Questions/Answers    Flowsheet Row Responses   Best Method for Contacting Home   Resources Presently Utilizing: WIC (Women, Infant, Children)   Other: Provided              Eli Bernal RN  Maternity Nurse Navigator    6/28/2022, 16:00 EDT      [facility]  Enrollment    Current Estimated Gestational Age: 30w5d    Questions/Answers    Flowsheet Row Responses   Would like to participate? Yes          Patient called office number. Discussed insurance and community resources with patient. Pt denies any needs or concerns at this time.    Eli Bernal RN  Maternity Nurse Navigator    6/28/2022, 16:00 EDT

## 2022-06-29 ENCOUNTER — PATIENT OUTREACH (OUTPATIENT)
Dept: LABOR AND DELIVERY | Facility: HOSPITAL | Age: 22
End: 2022-06-29

## 2022-06-29 ENCOUNTER — APPOINTMENT (OUTPATIENT)
Dept: ULTRASOUND IMAGING | Facility: HOSPITAL | Age: 22
End: 2022-06-29

## 2022-06-29 LAB — GLUCOSE 1H P CHAL SERPL-MCNC: 263 MG/DL (ref 50–400)

## 2022-06-29 PROCEDURE — 59025 FETAL NON-STRESS TEST: CPT

## 2022-06-29 PROCEDURE — 76819 FETAL BIOPHYS PROFIL W/O NST: CPT | Performed by: RADIOLOGY

## 2022-06-29 PROCEDURE — 82947 ASSAY GLUCOSE BLOOD QUANT: CPT | Performed by: OBSTETRICS & GYNECOLOGY

## 2022-06-29 PROCEDURE — G0463 HOSPITAL OUTPT CLINIC VISIT: HCPCS

## 2022-06-29 PROCEDURE — 76819 FETAL BIOPHYS PROFIL W/O NST: CPT

## 2022-06-29 PROCEDURE — G0378 HOSPITAL OBSERVATION PER HR: HCPCS

## 2022-06-29 RX ORDER — PRENATAL VIT/IRON FUM/FOLIC AC 27MG-0.8MG
1 TABLET ORAL DAILY
Status: DISCONTINUED | OUTPATIENT
Start: 2022-06-30 | End: 2022-06-30 | Stop reason: HOSPADM

## 2022-06-29 NOTE — OUTREACH NOTE
[facility]  IP Antepartum    Current Estimated Gestational Age: 30w6d    Questions/Answers    Flowsheet Row Responses   Best Method for Contacting Home   Support Person Present No   Community Resources/Benefits currently in use: WIC   Understanding of diagnosis/reason for admission: Patient understands, no questions or concerns at this time          Spoke with patient at bedside. Pt denies any needs or concerns at this time.    Eli Bernal RN  Maternity Nurse Navigator    6/29/2022, 10:05 EDT

## 2022-06-30 ENCOUNTER — HOSPITAL ENCOUNTER (OUTPATIENT)
Facility: HOSPITAL | Age: 22
End: 2022-06-30
Attending: OBSTETRICS & GYNECOLOGY | Admitting: OBSTETRICS & GYNECOLOGY

## 2022-06-30 VITALS
HEART RATE: 69 BPM | TEMPERATURE: 97.6 F | BODY MASS INDEX: 28.66 KG/M2 | HEIGHT: 65 IN | RESPIRATION RATE: 18 BRPM | SYSTOLIC BLOOD PRESSURE: 117 MMHG | WEIGHT: 172 LBS | DIASTOLIC BLOOD PRESSURE: 56 MMHG | OXYGEN SATURATION: 100 %

## 2022-06-30 PROCEDURE — G0378 HOSPITAL OBSERVATION PER HR: HCPCS

## 2022-06-30 PROCEDURE — 59025 FETAL NON-STRESS TEST: CPT

## 2022-06-30 RX ORDER — MICONAZOLE NITRATE 200 MG-2 %
200 KIT VAGINAL NIGHTLY
Qty: 1 EACH | Refills: 0 | Status: SHIPPED | OUTPATIENT
Start: 2022-06-30 | End: 2022-07-03

## 2022-06-30 RX ORDER — NIFEDIPINE 30 MG/1
30 TABLET, FILM COATED, EXTENDED RELEASE ORAL DAILY
Qty: 30 TABLET | Refills: 1 | Status: SHIPPED | OUTPATIENT
Start: 2022-06-30 | End: 2022-07-18 | Stop reason: HOSPADM

## 2022-07-02 LAB — BACTERIA SPEC AEROBE CULT: ABNORMAL

## 2022-07-07 NOTE — DISCHARGE SUMMARY
Discharge Summary    Admit Date: 6/28/2022  8:43 PM    Admit Diagnosis: Pregnancy [Z34.90]    Date of Discharge:  7/7/2022    Discharge Diagnosis: Same        Hospital Course  Patient is a 21 y.o. female, G3, P 1102. Patient was admitted on 6-28 secondary to pre term labor and insufficient PNC. Celestone x2.Rhogam. +GBS. Patient stable. Will discharge to home today.         Vital Signs   See Chart    Review of Systems    The following systems were reviewed and negative;  ENT, respiratory, cardiovascular, gastrointestinal, genitourinary, breast, endocrine and allergies / immunologic.      Physical Exam:      General Appearance:    Alert, cooperative, in no acute distress   Head:    Normocephalic, without obvious abnormality, atraumatic   Eyes:            Lids and lashes normal, conjunctivae and sclerae normal, no   icterus, no pallor, corneas clear, PERRLA   Ears:    Ears appear intact with no abnormalities noted   Throat:   No oral lesions, no thrush, oral mucosa moist   Neck:   No adenopathy, supple, trachea midline, no thyromegaly, no     carotid bruit, no JVD   Back:     No kyphosis present, no scoliosis present, no skin lesions,       erythema or scars, no tenderness to percussion or                   palpation,   range of motion normal   Lungs:     Clear to auscultation,respirations regular, even and                   unlabored    Heart:    Regular rhythm and normal rate, normal S1 and S2, no            murmur, no gallop, no rub, no click   Breast Exam:    Deferred   Abdomen:     Normal bowel sounds, no masses, no organomegaly, soft        non-tender, non-distended, no guarding, no rebound                 tenderness   Genitalia:    Deferred   Extremities:   Moves all extremities well, no edema, no cyanosis, no              redness   Pulses:   Pulses palpable and equal bilaterally   Skin:   No bleeding, bruising or rash   Lymph nodes:   No palpable adenopathy   Neurologic:   Cranial nerves 2 - 12 grossly intact,  sensation intact, DTR        present and equal bilaterally             Condition on Discharge:  Stable    Urine Output Good    Discharge Diet: Regular    Follow-up Appointments  Patient will follow up in clinic next week.        Tu Richard MD.   07/07/22  08:50 EDT

## 2022-07-16 ENCOUNTER — HOSPITAL ENCOUNTER (INPATIENT)
Facility: HOSPITAL | Age: 22
LOS: 2 days | Discharge: HOME | End: 2022-07-18
Attending: OBSTETRICS & GYNECOLOGY | Admitting: OBSTETRICS & GYNECOLOGY

## 2022-07-16 ENCOUNTER — APPOINTMENT (OUTPATIENT)
Dept: ULTRASOUND IMAGING | Facility: HOSPITAL | Age: 22
End: 2022-07-16

## 2022-07-16 PROBLEM — O60.00 PRETERM LABOR: Status: ACTIVE | Noted: 2022-07-16

## 2022-07-16 LAB
ABO GROUP BLD: NORMAL
AMPHET+METHAMPHET UR QL: NEGATIVE
AMPHETAMINES UR QL: NEGATIVE
BACTERIA UR QL AUTO: ABNORMAL /HPF
BARBITURATES UR QL SCN: NEGATIVE
BENZODIAZ UR QL SCN: NEGATIVE
BILIRUB UR QL STRIP: NEGATIVE
BLD GP AB SCN SERPL QL: POSITIVE
BUPRENORPHINE SERPL-MCNC: NEGATIVE NG/ML
CANNABINOIDS SERPL QL: NEGATIVE
CLARITY UR: CLEAR
COCAINE UR QL: NEGATIVE
COLOR UR: YELLOW
DEPRECATED RDW RBC AUTO: 44.3 FL (ref 37–54)
ERYTHROCYTE [DISTWIDTH] IN BLOOD BY AUTOMATED COUNT: 13.8 % (ref 12.3–15.4)
GLUCOSE BLDC GLUCOMTR-MCNC: 132 MG/DL (ref 70–130)
GLUCOSE BLDC GLUCOMTR-MCNC: 133 MG/DL (ref 70–130)
GLUCOSE UR STRIP-MCNC: NEGATIVE MG/DL
HCT VFR BLD AUTO: 33.6 % (ref 34–46.6)
HGB BLD-MCNC: 11.4 G/DL (ref 12–15.9)
HGB UR QL STRIP.AUTO: NEGATIVE
HIV1+2 AB SER QL: NORMAL
HYALINE CASTS UR QL AUTO: ABNORMAL /LPF
KETONES UR QL STRIP: NEGATIVE
LEUKOCYTE ESTERASE UR QL STRIP.AUTO: ABNORMAL
MCH RBC QN AUTO: 29.8 PG (ref 26.6–33)
MCHC RBC AUTO-ENTMCNC: 33.9 G/DL (ref 31.5–35.7)
MCV RBC AUTO: 87.7 FL (ref 79–97)
METHADONE UR QL SCN: NEGATIVE
NITRITE UR QL STRIP: NEGATIVE
OPIATES UR QL: NEGATIVE
OXYCODONE UR QL SCN: NEGATIVE
PCP UR QL SCN: NEGATIVE
PH UR STRIP.AUTO: 7 [PH] (ref 5–8)
PLATELET # BLD AUTO: 150 10*3/MM3 (ref 140–450)
PMV BLD AUTO: 10.1 FL (ref 6–12)
PROPOXYPH UR QL: NEGATIVE
PROT UR QL STRIP: NEGATIVE
RBC # BLD AUTO: 3.83 10*6/MM3 (ref 3.77–5.28)
RBC # UR STRIP: ABNORMAL /HPF
REF LAB TEST METHOD: ABNORMAL
RESIDUAL RHIG DETECTED: NORMAL
RH BLD: NEGATIVE
SARS-COV-2 RNA RESP QL NAA+PROBE: NOT DETECTED
SP GR UR STRIP: 1.01 (ref 1–1.03)
SQUAMOUS #/AREA URNS HPF: ABNORMAL /HPF
T&S EXPIRATION DATE: NORMAL
TRICYCLICS UR QL SCN: NEGATIVE
UROBILINOGEN UR QL STRIP: ABNORMAL
WBC # UR STRIP: ABNORMAL /HPF
WBC NRBC COR # BLD: 12.78 10*3/MM3 (ref 3.4–10.8)

## 2022-07-16 PROCEDURE — 87635 SARS-COV-2 COVID-19 AMP PRB: CPT | Performed by: OBSTETRICS & GYNECOLOGY

## 2022-07-16 PROCEDURE — 59025 FETAL NON-STRESS TEST: CPT

## 2022-07-16 PROCEDURE — 81001 URINALYSIS AUTO W/SCOPE: CPT | Performed by: OBSTETRICS & GYNECOLOGY

## 2022-07-16 PROCEDURE — 25010000002 TERBUTALINE PER 1 MG

## 2022-07-16 PROCEDURE — 82962 GLUCOSE BLOOD TEST: CPT

## 2022-07-16 PROCEDURE — 87147 CULTURE TYPE IMMUNOLOGIC: CPT | Performed by: OBSTETRICS & GYNECOLOGY

## 2022-07-16 PROCEDURE — 86850 RBC ANTIBODY SCREEN: CPT | Performed by: OBSTETRICS & GYNECOLOGY

## 2022-07-16 PROCEDURE — 86870 RBC ANTIBODY IDENTIFICATION: CPT | Performed by: OBSTETRICS & GYNECOLOGY

## 2022-07-16 PROCEDURE — G0463 HOSPITAL OUTPT CLINIC VISIT: HCPCS

## 2022-07-16 PROCEDURE — 80306 DRUG TEST PRSMV INSTRMNT: CPT | Performed by: OBSTETRICS & GYNECOLOGY

## 2022-07-16 PROCEDURE — 25010000002 BETAMETHASONE ACET & SOD PHOS PER 4 MG: Performed by: OBSTETRICS & GYNECOLOGY

## 2022-07-16 PROCEDURE — 86901 BLOOD TYPING SEROLOGIC RH(D): CPT | Performed by: OBSTETRICS & GYNECOLOGY

## 2022-07-16 PROCEDURE — 76819 FETAL BIOPHYS PROFIL W/O NST: CPT

## 2022-07-16 PROCEDURE — 0 PENICILLIN G POTASSIUM PER 600000 UNITS: Performed by: OBSTETRICS & GYNECOLOGY

## 2022-07-16 PROCEDURE — 86900 BLOOD TYPING SEROLOGIC ABO: CPT | Performed by: OBSTETRICS & GYNECOLOGY

## 2022-07-16 PROCEDURE — 85027 COMPLETE CBC AUTOMATED: CPT | Performed by: OBSTETRICS & GYNECOLOGY

## 2022-07-16 PROCEDURE — 87086 URINE CULTURE/COLONY COUNT: CPT | Performed by: OBSTETRICS & GYNECOLOGY

## 2022-07-16 PROCEDURE — G0432 EIA HIV-1/HIV-2 SCREEN: HCPCS | Performed by: OBSTETRICS & GYNECOLOGY

## 2022-07-16 RX ORDER — MAGNESIUM HYDROXIDE 1200 MG/15ML
1000 LIQUID ORAL ONCE AS NEEDED
Status: DISCONTINUED | OUTPATIENT
Start: 2022-07-16 | End: 2022-07-18

## 2022-07-16 RX ORDER — LIDOCAINE HYDROCHLORIDE 10 MG/ML
5 INJECTION, SOLUTION EPIDURAL; INFILTRATION; INTRACAUDAL; PERINEURAL AS NEEDED
Status: DISCONTINUED | OUTPATIENT
Start: 2022-07-16 | End: 2022-07-16

## 2022-07-16 RX ORDER — CALCIUM CARBONATE 200(500)MG
2 TABLET,CHEWABLE ORAL DAILY PRN
Status: DISCONTINUED | OUTPATIENT
Start: 2022-07-16 | End: 2022-07-18 | Stop reason: HOSPADM

## 2022-07-16 RX ORDER — FAMOTIDINE 10 MG/ML
20 INJECTION, SOLUTION INTRAVENOUS ONCE AS NEEDED
Status: CANCELLED | OUTPATIENT
Start: 2022-07-16

## 2022-07-16 RX ORDER — OXYCODONE AND ACETAMINOPHEN 7.5; 325 MG/1; MG/1
1 TABLET ORAL EVERY 4 HOURS PRN
Status: CANCELLED | OUTPATIENT
Start: 2022-07-16 | End: 2022-07-23

## 2022-07-16 RX ORDER — NIFEDIPINE 10 MG/1
10 CAPSULE ORAL EVERY 8 HOURS SCHEDULED
Status: DISPENSED | OUTPATIENT
Start: 2022-07-16 | End: 2022-07-18

## 2022-07-16 RX ORDER — DEXTROSE MONOHYDRATE 25 G/50ML
25 INJECTION, SOLUTION INTRAVENOUS
Status: DISCONTINUED | OUTPATIENT
Start: 2022-07-16 | End: 2022-07-18 | Stop reason: HOSPADM

## 2022-07-16 RX ORDER — SODIUM CHLORIDE 0.9 % (FLUSH) 0.9 %
10 SYRINGE (ML) INJECTION AS NEEDED
Status: DISCONTINUED | OUTPATIENT
Start: 2022-07-16 | End: 2022-07-18

## 2022-07-16 RX ORDER — METHYLERGONOVINE MALEATE 0.2 MG/ML
200 INJECTION INTRAVENOUS ONCE AS NEEDED
Status: CANCELLED | OUTPATIENT
Start: 2022-07-16

## 2022-07-16 RX ORDER — METHYLERGONOVINE MALEATE 0.2 MG/ML
INJECTION INTRAVENOUS
Status: DISCONTINUED
Start: 2022-07-16 | End: 2022-07-16 | Stop reason: WASHOUT

## 2022-07-16 RX ORDER — OXYTOCIN/0.9 % SODIUM CHLORIDE 30/500 ML
999 PLASTIC BAG, INJECTION (ML) INTRAVENOUS ONCE
Status: CANCELLED | OUTPATIENT
Start: 2022-07-16 | End: 2022-07-16

## 2022-07-16 RX ORDER — IBUPROFEN 600 MG/1
600 TABLET ORAL EVERY 6 HOURS PRN
Status: CANCELLED | OUTPATIENT
Start: 2022-07-16

## 2022-07-16 RX ORDER — ONDANSETRON 2 MG/ML
4 INJECTION INTRAMUSCULAR; INTRAVENOUS EVERY 6 HOURS PRN
Status: DISCONTINUED | OUTPATIENT
Start: 2022-07-16 | End: 2022-07-18 | Stop reason: HOSPADM

## 2022-07-16 RX ORDER — ACETAMINOPHEN 325 MG/1
650 TABLET ORAL EVERY 4 HOURS PRN
Status: DISCONTINUED | OUTPATIENT
Start: 2022-07-16 | End: 2022-07-18

## 2022-07-16 RX ORDER — FAMOTIDINE 20 MG/1
20 TABLET, FILM COATED ORAL DAILY
Status: DISCONTINUED | OUTPATIENT
Start: 2022-07-16 | End: 2022-07-18 | Stop reason: HOSPADM

## 2022-07-16 RX ORDER — TERBUTALINE SULFATE 1 MG/ML
0.25 INJECTION, SOLUTION SUBCUTANEOUS AS NEEDED
Status: DISCONTINUED | OUTPATIENT
Start: 2022-07-16 | End: 2022-07-18 | Stop reason: HOSPADM

## 2022-07-16 RX ORDER — NIFEDIPINE 30 MG/1
30 TABLET, FILM COATED, EXTENDED RELEASE ORAL DAILY PRN
Status: DISCONTINUED | OUTPATIENT
Start: 2022-07-16 | End: 2022-07-16

## 2022-07-16 RX ORDER — NIFEDIPINE 10 MG/1
10 CAPSULE ORAL EVERY 8 HOURS SCHEDULED
Status: DISCONTINUED | OUTPATIENT
Start: 2022-07-16 | End: 2022-07-16

## 2022-07-16 RX ORDER — HYDROXYZINE 50 MG/1
50 TABLET, FILM COATED ORAL NIGHTLY PRN
Status: CANCELLED | OUTPATIENT
Start: 2022-07-16

## 2022-07-16 RX ORDER — BETAMETHASONE SODIUM PHOSPHATE AND BETAMETHASONE ACETATE 3; 3 MG/ML; MG/ML
12 INJECTION, SUSPENSION INTRA-ARTICULAR; INTRALESIONAL; INTRAMUSCULAR; SOFT TISSUE EVERY 24 HOURS
Status: COMPLETED | OUTPATIENT
Start: 2022-07-16 | End: 2022-07-17

## 2022-07-16 RX ORDER — INSULIN ASPART 100 [IU]/ML
0-7 INJECTION, SOLUTION INTRAVENOUS; SUBCUTANEOUS EVERY 6 HOURS
Status: DISCONTINUED | OUTPATIENT
Start: 2022-07-16 | End: 2022-07-17

## 2022-07-16 RX ORDER — ONDANSETRON 4 MG/1
4 TABLET, FILM COATED ORAL EVERY 8 HOURS PRN
Status: DISCONTINUED | OUTPATIENT
Start: 2022-07-16 | End: 2022-07-16

## 2022-07-16 RX ORDER — NICOTINE POLACRILEX 4 MG
15 LOZENGE BUCCAL
Status: DISCONTINUED | OUTPATIENT
Start: 2022-07-16 | End: 2022-07-18 | Stop reason: HOSPADM

## 2022-07-16 RX ORDER — SODIUM CHLORIDE 0.9 % (FLUSH) 0.9 %
10 SYRINGE (ML) INJECTION AS NEEDED
Status: DISCONTINUED | OUTPATIENT
Start: 2022-07-16 | End: 2022-07-18 | Stop reason: HOSPADM

## 2022-07-16 RX ORDER — MORPHINE SULFATE 2 MG/ML
2 INJECTION, SOLUTION INTRAMUSCULAR; INTRAVENOUS
Status: DISCONTINUED | OUTPATIENT
Start: 2022-07-16 | End: 2022-07-18 | Stop reason: HOSPADM

## 2022-07-16 RX ORDER — PRENATAL VIT/IRON FUM/FOLIC AC 27MG-0.8MG
1 TABLET ORAL DAILY
Status: DISCONTINUED | OUTPATIENT
Start: 2022-07-16 | End: 2022-07-18 | Stop reason: HOSPADM

## 2022-07-16 RX ORDER — ACETAMINOPHEN 325 MG/1
650 TABLET ORAL EVERY 4 HOURS PRN
Status: DISCONTINUED | OUTPATIENT
Start: 2022-07-16 | End: 2022-07-18 | Stop reason: HOSPADM

## 2022-07-16 RX ORDER — HYDROXYZINE 50 MG/1
50 TABLET, FILM COATED ORAL NIGHTLY PRN
Status: DISCONTINUED | OUTPATIENT
Start: 2022-07-16 | End: 2022-07-16 | Stop reason: SDUPTHER

## 2022-07-16 RX ORDER — FAMOTIDINE 20 MG/1
20 TABLET, FILM COATED ORAL ONCE AS NEEDED
Status: CANCELLED | OUTPATIENT
Start: 2022-07-16

## 2022-07-16 RX ORDER — MISOPROSTOL 100 UG/1
1000 TABLET ORAL ONCE AS NEEDED
Status: CANCELLED | OUTPATIENT
Start: 2022-07-16

## 2022-07-16 RX ORDER — OXYTOCIN/0.9 % SODIUM CHLORIDE 30/500 ML
250 PLASTIC BAG, INJECTION (ML) INTRAVENOUS CONTINUOUS
Status: CANCELLED | OUTPATIENT
Start: 2022-07-16 | End: 2022-07-16

## 2022-07-16 RX ORDER — MAGNESIUM CARB/ALUMINUM HYDROX 105-160MG
300 TABLET,CHEWABLE ORAL ONCE
Status: DISCONTINUED | OUTPATIENT
Start: 2022-07-16 | End: 2022-07-16

## 2022-07-16 RX ORDER — TERBUTALINE SULFATE 1 MG/ML
INJECTION, SOLUTION SUBCUTANEOUS
Status: COMPLETED
Start: 2022-07-16 | End: 2022-07-16

## 2022-07-16 RX ORDER — ONDANSETRON 2 MG/ML
4 INJECTION INTRAMUSCULAR; INTRAVENOUS EVERY 8 HOURS PRN
Status: DISCONTINUED | OUTPATIENT
Start: 2022-07-16 | End: 2022-07-16

## 2022-07-16 RX ORDER — CARBOPROST TROMETHAMINE 250 UG/ML
250 INJECTION, SOLUTION INTRAMUSCULAR
Status: CANCELLED | OUTPATIENT
Start: 2022-07-16

## 2022-07-16 RX ORDER — HYDROXYZINE 50 MG/1
50 TABLET, FILM COATED ORAL NIGHTLY PRN
Status: DISCONTINUED | OUTPATIENT
Start: 2022-07-16 | End: 2022-07-18 | Stop reason: HOSPADM

## 2022-07-16 RX ORDER — SODIUM CHLORIDE, SODIUM LACTATE, POTASSIUM CHLORIDE, CALCIUM CHLORIDE 600; 310; 30; 20 MG/100ML; MG/100ML; MG/100ML; MG/100ML
125 INJECTION, SOLUTION INTRAVENOUS CONTINUOUS
Status: DISCONTINUED | OUTPATIENT
Start: 2022-07-16 | End: 2022-07-18

## 2022-07-16 RX ORDER — OXYTOCIN/0.9 % SODIUM CHLORIDE 30/500 ML
2-20 PLASTIC BAG, INJECTION (ML) INTRAVENOUS
Status: DISCONTINUED | OUTPATIENT
Start: 2022-07-16 | End: 2022-07-18

## 2022-07-16 RX ORDER — TRISODIUM CITRATE DIHYDRATE AND CITRIC ACID MONOHYDRATE 500; 334 MG/5ML; MG/5ML
30 SOLUTION ORAL ONCE AS NEEDED
Status: DISCONTINUED | OUTPATIENT
Start: 2022-07-16 | End: 2022-07-18 | Stop reason: HOSPADM

## 2022-07-16 RX ORDER — ONDANSETRON 4 MG/1
4 TABLET, FILM COATED ORAL EVERY 6 HOURS PRN
Status: DISCONTINUED | OUTPATIENT
Start: 2022-07-16 | End: 2022-07-18 | Stop reason: HOSPADM

## 2022-07-16 RX ORDER — FAMOTIDINE 10 MG/ML
20 INJECTION, SOLUTION INTRAVENOUS DAILY
Status: DISCONTINUED | OUTPATIENT
Start: 2022-07-16 | End: 2022-07-18 | Stop reason: HOSPADM

## 2022-07-16 RX ORDER — SODIUM CHLORIDE 0.9 % (FLUSH) 0.9 %
10 SYRINGE (ML) INJECTION EVERY 12 HOURS SCHEDULED
Status: DISCONTINUED | OUTPATIENT
Start: 2022-07-16 | End: 2022-07-16

## 2022-07-16 RX ADMIN — NIFEDIPINE 10 MG: 10 CAPSULE ORAL at 10:05

## 2022-07-16 RX ADMIN — SODIUM CHLORIDE 5 MILLION UNITS: 9 INJECTION, SOLUTION INTRAVENOUS at 06:40

## 2022-07-16 RX ADMIN — SODIUM CHLORIDE 3 MILLION UNITS: 900 INJECTION INTRAVENOUS at 19:59

## 2022-07-16 RX ADMIN — SODIUM CHLORIDE, POTASSIUM CHLORIDE, SODIUM LACTATE AND CALCIUM CHLORIDE 125 ML/HR: 600; 310; 30; 20 INJECTION, SOLUTION INTRAVENOUS at 10:06

## 2022-07-16 RX ADMIN — SODIUM CHLORIDE, POTASSIUM CHLORIDE, SODIUM LACTATE AND CALCIUM CHLORIDE 125 ML/HR: 600; 310; 30; 20 INJECTION, SOLUTION INTRAVENOUS at 06:18

## 2022-07-16 RX ADMIN — BETAMETHASONE SODIUM PHOSPHATE AND BETAMETHASONE ACETATE 12 MG: 3; 3 INJECTION, SUSPENSION INTRA-ARTICULAR; INTRALESIONAL; INTRAMUSCULAR; SOFT TISSUE at 06:19

## 2022-07-16 RX ADMIN — SODIUM CHLORIDE, POTASSIUM CHLORIDE, SODIUM LACTATE AND CALCIUM CHLORIDE 125 ML/HR: 600; 310; 30; 20 INJECTION, SOLUTION INTRAVENOUS at 18:52

## 2022-07-16 RX ADMIN — SODIUM CHLORIDE 3 MILLION UNITS: 900 INJECTION INTRAVENOUS at 15:17

## 2022-07-16 RX ADMIN — FAMOTIDINE 20 MG: 20 TABLET, FILM COATED ORAL at 17:00

## 2022-07-16 RX ADMIN — NIFEDIPINE 10 MG: 10 CAPSULE ORAL at 23:00

## 2022-07-16 RX ADMIN — TERBUTALINE SULFATE 1 MG: 1 INJECTION, SOLUTION SUBCUTANEOUS at 06:18

## 2022-07-16 RX ADMIN — SODIUM CHLORIDE 3 MILLION UNITS: 900 INJECTION INTRAVENOUS at 10:05

## 2022-07-16 RX ADMIN — PRENATAL VIT W/ FE FUMARATE-FA TAB 27-0.8 MG 1 TABLET: 27-0.8 TAB at 09:50

## 2022-07-16 NOTE — NON STRESS TEST
Bianca Nieves, a  at 33w2d with an JAME of 2022, by Ultrasound, was seen at [facility] DELIVERY for a nonstress test.    Chief Complaint   Patient presents with   • Contractions       Patient Active Problem List   Diagnosis   • Right upper quadrant abdominal pain   • Leukocytosis   • Abdominal pain affecting pregnancy   • Pregnancy   • Abdominal pain during pregnancy   • Visit for wound check   • UTI (urinary tract infection)       Start Time: 0600  Stop Time: 0630     Interpretation A  Nonstress Test Interpretation A: Reactive  Comments A: Simran RAE

## 2022-07-16 NOTE — H&P
JOSE Abarca  Obstetric History and Physical    Chief Complaint   Patient presents with   • Contractions       Subjective     Patient is a 21 y.o. female  currently at 33w2d, who presents with contractions.  Pt states that she was having cramping all day yesterday which became much worse at 0100 this morning. She was sexually active last night. States that she had 2 episodes of leakage of fluid this morning, which has not continued. Denies VB. Active FM.     Her pregnancy is c/b:  - Hx PTL/PTD in prior pregnancy - declined austyn  - Hx PPH following prior delivery with D&C required for retained POC  - PTL this pregnancy - s/p BMZ   - GBS pos  - Limited prenatal care - had 50g GTT while admitted for PTL - 50g GTT elevated, suspected due to BMZ; has been checking glucose at home - reports fasting glucose 100; 1hr postprandials 120s    The following portions of the patients history were reviewed and updated as appropriate: past medical history and past surgical history .     She denies history of asthma, HTN, HSV.      Prenatal Information:   Maternal Prenatal Labs  Blood Type No results found for: ABO   Rh Status No results found for: RH   Antibody Screen No results found for: ABSCRN   Rapid Urin Drug Screen Barbiturates Screen, Urine   Date Value Ref Range Status   2022 Negative Negative Final     Benzodiazepine Screen, Urine   Date Value Ref Range Status   2022 Negative Negative Final     Methadone Screen, Urine   Date Value Ref Range Status   2022 Negative Negative Final     Opiate Screen   Date Value Ref Range Status   2022 Negative Negative Final     THC, Screen, Urine   Date Value Ref Range Status   2022 Negative Negative Final     Cocaine Screen, Urine   Date Value Ref Range Status   2022 Negative Negative Final     Amphetamine Screen, Urine   Date Value Ref Range Status   2022 Negative Negative Final     Propoxyphene Screen   Date Value Ref Range Status    07/16/2022 Negative Negative Final     Buprenorphine, Screen, Urine   Date Value Ref Range Status   07/16/2022 Negative Negative Final     Methamphetamine, Ur   Date Value Ref Range Status   07/16/2022 Negative Negative Final     Oxycodone Screen, Urine   Date Value Ref Range Status   07/16/2022 Negative Negative Final     Tricyclic Antidepressants Screen   Date Value Ref Range Status   07/16/2022 Negative Negative Final      Group B Strep Culture No results found for: GBSANTIGEN           External Prenatal Results     Pregnancy Outside Results - Transcribed From Office Records - See Scanned Records For Details     Test Value Date Time    ABO  O  06/28/22 2057    Rh  Negative  06/28/22 2057    Antibody Screen  Negative  06/28/22 2057      ^ Negative  02/28/22     Varicella IgG       Rubella ^ Immune  02/28/22     Hgb  11.2 g/dL 05/31/22 1946    Hct  32.6 % 05/31/22 1946    Glucose Fasting GTT       Glucose Tolerance Test 1 hour ^ 76  05/05/22     Glucose Tolerance Test 3 hour       Gonorrhea (discrete) ^ Negative  02/28/22     Chlamydia (discrete) ^ Negative  02/28/22     RPR ^ Non-Reactive  02/28/22     VDRL       Syphilis Antibody       HBsAg ^ Negative  02/28/22     Herpes Simplex Virus PCR       Herpes Simplex VIrus Culture       HIV ^ Non-Reactive  02/13/19     Hep C RNA Quant PCR       Hep C Antibody       AFP       Group B Strep  Streptococcus agalactiae (Group B)  06/28/22 2244    GBS Susceptibility to Clindamycin       GBS Susceptibility to Erythromycin       Fetal Fibronectin       Genetic Testing, Maternal Blood             Drug Screening     Test Value Date Time    Urine Drug Screen       Amphetamine Screen  Negative  07/16/22 0521       Negative  06/28/22 2214       Negative  05/31/22 1940    Barbiturate Screen  Negative  07/16/22 0521       Negative  06/28/22 2214       Negative  05/31/22 1940    Benzodiazepine Screen  Negative  07/16/22 0521       Negative  06/28/22 2214       Negative  05/31/22  194    Methadone Screen  Negative  22 0521       Negative  22 2214       Negative  22 194    Phencyclidine Screen  Negative  22 0521       Negative  22 2214       Negative  22    Opiates Screen  Negative  22 0521       Negative  22 2214       Negative  22    THC Screen  Negative  22 0521       Negative  22 2214       Negative  22    Cocaine Screen       Propoxyphene Screen  Negative  22 0521       Negative  22 2214       Negative  22 194    Buprenorphine Screen  Negative  22 0521       Negative  22 2214       Negative  22    Methamphetamine Screen       Oxycodone Screen  Negative  22 0521       Negative  22 2214       Negative  22    Tricyclic Antidepressants Screen  Negative  22 0521       Negative  22 2214       Negative  22          Legend    ^: Historical                          Past OB History:     OB History    Para Term  AB Living   3 2 1 1 0 2   SAB IAB Ectopic Molar Multiple Live Births   0 0 0 0 0 2      # Outcome Date GA Lbr Lee/2nd Weight Sex Delivery Anes PTL Lv   3 Current            2 Term 19 39w0d / 00:05 3096 g (6 lb 13.2 oz) M Vag-Spont EPI N CARRILLO      Name: NIDHI VENTURA      Apgar1: 9  Apgar5: 9   1  17 36w6d 11:32 / 00:13 2546 g (5 lb 9.8 oz) F Vag-Spont EPI N CARRILLO      Complications: Retained placenta      Name: AUDREYVIANEY      Apgar1: 8  Apgar5: 9       Past Medical History: Past Medical History:   Diagnosis Date   • Anxiety    • Depression    • H/O seasonal allergies    • PPH (postpartum hemorrhage)    • Psoriasis    • Psoriasis    • Psoriasis    • Urinary tract infection       Past Surgical History Past Surgical History:   Procedure Laterality Date   • DILATATION AND CURETTAGE N/A 2017    Procedure: DILATATION AND CURETTAGE;  Surgeon: Juan Pablo Wood MD;  Location:   [facility] OR;  Service:    • HEAD/NECK LESION/CYST EXCISION N/A 2020    Procedure: FACIAL LESION/CYST EXCISION;  Surgeon: Jay Cohen MD;  Location:  [facility] OR;  Service: General;  Laterality: N/A;      Family History: Family History   Problem Relation Age of Onset   • No Known Problems Mother    • Alcohol abuse Father    • Diabetes Father    • Asthma Maternal Grandmother    • Diabetes Paternal Grandmother    • Asthma Paternal Grandmother       Social History:  reports that she has quit smoking. Her smoking use included cigarettes. She has a 2.00 pack-year smoking history. She has never used smokeless tobacco.   reports no history of alcohol use.   reports no history of drug use.        Review of Systems  Complete ROS including constitutional, skin, HENT, Eyes, CV, Resp, GI, , MS, Endo/heme/allergies, Neuro, and Psych is negative unless otherwise indicated above or detailed in HPI      Objective     Vital Signs Range for the last 24 hours  Temperature: Temp:  [97.9 °F (36.6 °C)] 97.9 °F (36.6 °C)   Temp Source: Temp src: Oral   BP: BP: (122)/(71) 122/71   Pulse: Heart Rate:  [105] 105   Respirations: Resp:  [18] 18   Weight: Weight:  [81.6 kg (180 lb)] 81.6 kg (180 lb)     Physical Examination:    Gen: NAD   CV: RRR   Resp: CTAB, normal work of breathing on RA   Abd: soft, nontender, gravid   Extr: no edema bilaterally      Cervix: 4-5/80/bulging bag   SSE: no pooling, no leakage with valsalva  Presentation: Vertex on BSUS     NST: 135/mod variability/+ accels/- decels  [place]: 3ctx/10min    US Fetal Biophysical Profile;Without Non-Stress Testing    Result Date: 2022: 1. OBSTETRIC ULTRASOUND THIRD TRIMESTER. 2. FETAL BIOPHYSICAL PROFILE. INDICATION: 21-year-old female, pregnant in 3rd trimester, presenting with  labor, decreased fetal movement. Assess fetal growth and well-being. TECHNIQUE: Transabdominal obstetric ultrasound examination. GENERAL EVALUATION: *  Cardiac activity: Present.  146 bpm. *  Presentation: Cephalic. *  Placenta site: Posterior. *  Amniotic fluid: BRANDON 9.4 cm. DVP 3.4 cm. FETAL BIOMETRY: BPD: 34 weeks, 6 days. HC: 34 weeks, 3 days. AC: 33 weeks, 3 days. FL: 31 weeks, 4 days. Fetal age by previous ultrasound: Unavailable. Fetal age by current ultrasound: 32 weeks, 6 days, JAME 2022. FETAL WEIGHT CALCULATION: EFW: 2126 g (4 pounds, 11 ounces). Percentile: 37th percentile. EFW by Hadlock [BPD-HC-AC-FL]. ________________________________________ BIOPHYSICAL PROFILE: Fetal Breathing Movements (FBM): 2/2 Gross Body Movements (GBM): 2/2 Fetal Tone (FT): 2/2 Amniotic Fluid Volume (AFV): 2/2 TOTAL SCORE: 8/8 ________________________________________     1.  Single living fetus at 32 weeks, 6 days in cephalic position. 2.  Fetal heart rate measures 146 bpm. 3.  AFV is within normal limits. 4.  Placenta is posterior. 5.  Normal fetal biophysical profile: 8/8. Signer Name: Blade Kennedy MD  Signed: 2022 10:24 AM  Workstation [facility]: [facility]  [facility]    US Fetal Biophysical Profile;Without Non-Stress Testing    Result Date: 2022  EXAMINATION: US FETAL BIOPHYSICAL PROFILE;WITHOUT NON-STRESS TESTING-  CLINICAL INDICATION: PTL   COMPARISON: 2017  FINDINGS: Transabdominal sonographic imaging of the pregnant pelvis.  Single living intrauterine pregnancy in the breech lie.  Placenta is anterior.  Estimated age 30 weeks 6 days.  Biophysical profile score 8 out of 8.      Single living intrauterine pregnancy with biophysical profile score 8 out of 8.  This report was finalized on 2022 3:35 PM by Dr. Agustín Liu MD.        Assessment/Plan:  Pt is 21 y.o.  33w2d admitted with  labor  1. Admit to L&D  2. Will initiate rescue course of steroids and will give procardia for tocolysis while on BMZ  3. GBS pos - PCN for ppx - will discontinue once cervical exam stabilizes  4. q6hr glucose checks with insulin sliding scale ordered    5. BID NST      Clara Ace MD  7/16/2022  08:05 EDT

## 2022-07-16 NOTE — NON STRESS TEST
Bianca Nieves, a  at 33w2d with an JAME of 2022, by Ultrasound, was seen at [facility] LABOR DELIVERY for a nonstress test.    Chief Complaint   Patient presents with   • Contractions       Patient Active Problem List   Diagnosis   • Right upper quadrant abdominal pain   • Leukocytosis   • Abdominal pain affecting pregnancy   • Pregnancy   • Abdominal pain during pregnancy   • Visit for wound check   • UTI (urinary tract infection)   •  labor       Start Time: 755  Stop Time: 815    Interpretation A  Nonstress Test Interpretation A: Reactive  Comments A: VERIFIED BY OCTAVIA BORJAS RN               [id number]

## 2022-07-16 NOTE — PAYOR COMM NOTE
"[facility]  CIELO NEUMANN  PHONE  [phone]  FAX  [phone]  NPI:  [id number]    REQUEST FOR INPATIENT AUTH    Jasmin Ventura (21 y.o. Female)             Date of Birth   2000    Social Security Number       Address   [address] [address]    Home Phone   [phone]    MRN   [id number]       [place]ze   Latter day    Marital Status   Single                            Admission Date   22    Admission Type   Elective    Admitting Provider   Clara Ace MD    Attending Provider   Clara Ace MD    Department, Room/Bed   [facility] LABOR DELIVERY, L227/       Discharge Date       Discharge Disposition       Discharge Destination                               Attending Provider: Clara Ace MD    Allergies: No Known Allergies    Isolation: None   Infection: None   Code Status: Prior   Advance Care Planning Activity    Ht: 165.1 cm (65\")   Wt: 81.6 kg (180 lb)    Admission Cmt: None   Principal Problem: None                Active Insurance as of 2022     Primary Coverage     Payor Plan Insurance Group Employer/Plan Group    [facility] [employer] [facility]      Payor Plan Address Payor Plan Phone Number Payor Plan Fax Number Effective Dates    [address]   2016 - None Entered    [facility] [id number]       Subscriber Name Subscriber Birth Date Member ID       JASMIN VENTURA 2000 [id number]                 Emergency Contacts      (Rel.) Home Phone Work Phone Mobile Phone    Bonnie Neumann (Mother) [phone] -- [phone]               History & Physical      Clara Ace MD at [address] [place] [address]  Obstetric History and Physical    Chief Complaint   Patient presents with   • Contractions       Subjective     Patient is a 21 y.o. female  currently at 33w2d, who presents with contractions.  Pt states that she was having cramping all day yesterday which " became much worse at 0100 this morning. She was sexually active last night. States that she had 2 episodes of leakage of fluid this morning, which has not continued. Denies VB. Active FM.     Her pregnancy is c/b:  - Hx PTL/PTD in prior pregnancy - declined austyn  - Hx PPH following prior delivery with D&C required for retained POC  - PTL this pregnancy - s/p BMZ 6/28-29  - GBS pos  - Limited prenatal care - had 50g GTT while admitted for PTL - 50g GTT elevated, suspected due to BMZ; has been checking glucose at home - reports fasting glucose 100; 1hr postprandials 120s    The following portions of the patients history were reviewed and updated as appropriate: past medical history and past surgical history .     She denies history of asthma, HTN, HSV.      Prenatal Information:   Maternal Prenatal Labs  Blood Type No results found for: ABO   Rh Status No results found for: RH   Antibody Screen No results found for: ABSCRN   Rapid Urin Drug Screen Barbiturates Screen, Urine   Date Value Ref Range Status   07/16/2022 Negative Negative Final     Benzodiazepine Screen, Urine   Date Value Ref Range Status   07/16/2022 Negative Negative Final     Methadone Screen, Urine   Date Value Ref Range Status   07/16/2022 Negative Negative Final     Opiate Screen   Date Value Ref Range Status   07/16/2022 Negative Negative Final     THC, Screen, Urine   Date Value Ref Range Status   07/16/2022 Negative Negative Final     Cocaine Screen, Urine   Date Value Ref Range Status   07/16/2022 Negative Negative Final     Amphetamine Screen, Urine   Date Value Ref Range Status   07/16/2022 Negative Negative Final     Propoxyphene Screen   Date Value Ref Range Status   07/16/2022 Negative Negative Final     Buprenorphine, Screen, Urine   Date Value Ref Range Status   07/16/2022 Negative Negative Final     Methamphetamine, Ur   Date Value Ref Range Status   07/16/2022 Negative Negative Final     Oxycodone Screen, Urine   Date Value Ref Range  Status   07/16/2022 Negative Negative Final     Tricyclic Antidepressants Screen   Date Value Ref Range Status   07/16/2022 Negative Negative Final      Group B Strep Culture No results found for: GBSANTIGEN           External Prenatal Results     Pregnancy Outside Results - Transcribed From Office Records - See Scanned Records For Details     Test Value Date Time    ABO  O  06/28/22 2057    Rh  Negative  06/28/22 2057    Antibody Screen  Negative  06/28/22 2057      ^ Negative  02/28/22     Varicella IgG       Rubella ^ Immune  02/28/22     Hgb  11.2 g/dL 05/31/22 1946    Hct  32.6 % 05/31/22 1946    Glucose Fasting GTT       Glucose Tolerance Test 1 hour ^ 76  05/05/22     Glucose Tolerance Test 3 hour       Gonorrhea (discrete) ^ Negative  02/28/22     Chlamydia (discrete) ^ Negative  02/28/22     RPR ^ Non-Reactive  02/28/22     VDRL       Syphilis Antibody       HBsAg ^ Negative  02/28/22     Herpes Simplex Virus PCR       Herpes Simplex VIrus Culture       HIV ^ Non-Reactive  02/13/19     Hep C RNA Quant PCR       Hep C Antibody       AFP       Group B Strep  Streptococcus agalactiae (Group B)  06/28/22 2244    GBS Susceptibility to Clindamycin       GBS Susceptibility to Erythromycin       Fetal Fibronectin       Genetic Testing, Maternal Blood             Drug Screening     Test Value Date Time    Urine Drug Screen       Amphetamine Screen  Negative  07/16/22 0521       Negative  06/28/22 2214       Negative  05/31/22 1940    Barbiturate Screen  Negative  07/16/22 0521       Negative  06/28/22 2214       Negative  05/31/22 1940    Benzodiazepine Screen  Negative  07/16/22 0521       Negative  06/28/22 2214       Negative  05/31/22 1940    Methadone Screen  Negative  07/16/22 0521       Negative  06/28/22 2214       Negative  05/31/22 1940    Phencyclidine Screen  Negative  07/16/22 0521       Negative  06/28/22 2214       Negative  05/31/22 1940    Opiates Screen  Negative  07/16/22 0521       Negative   22 2214       Negative  22    THC Screen  Negative  22 0521       Negative  22 2214       Negative  22    Cocaine Screen       Propoxyphene Screen  Negative  22 0521       Negative  22 2214       Negative  22    Buprenorphine Screen  Negative  22 0521       Negative  22 2214       Negative  22    Methamphetamine Screen       Oxycodone Screen  Negative  22 0521       Negative  22 2214       Negative  22    Tricyclic Antidepressants Screen  Negative  22 0521       Negative  22 2214       Negative  22          Legend    ^: Historical                          Past OB History:     OB History    Para Term  AB Living   3 2 1 1 0 2   SAB IAB Ectopic Molar Multiple Live Births   0 0 0 0 0 2      # Outcome Date GA Lbr Lee/2nd Weight Sex Delivery Anes PTL Lv   3 Current            2 Term 19 39w0d / 00:05 3096 g (6 lb 13.2 oz) M Vag-Spont EPI N CARRILLO      Name: NIDHI VENTURA      Apgar1: 9  Apgar5: 9   1  17 36w6d 11:32 / 00:13 2546 g (5 lb 9.8 oz) F Vag-Spont EPI N CARRILLO      Complications: Retained placenta      Name: VIANEY VENTURA      Apgar1: 8  Apgar5: 9       Past Medical History: Past Medical History:   Diagnosis Date   • Anxiety    • Depression    • H/O seasonal allergies    • PPH (postpartum hemorrhage)    • Psoriasis    • Psoriasis    • Psoriasis    • Urinary tract infection       Past Surgical History Past Surgical History:   Procedure Laterality Date   • DILATATION AND CURETTAGE N/A 2017    Procedure: DILATATION AND CURETTAGE;  Surgeon: Juan Pablo Wood MD;  Location:  [facility] OR;  Service:    • HEAD/NECK LESION/CYST EXCISION N/A 2020    Procedure: FACIAL LESION/CYST EXCISION;  Surgeon: Jay Cohen MD;  Location: [facility] OR;  Service: General;  Laterality: N/A;      Family History: Family History   Problem Relation Age of Onset   • No  Known Problems Mother    • Alcohol abuse Father    • Diabetes Father    • Asthma Maternal Grandmother    • Diabetes Paternal Grandmother    • Asthma Paternal Grandmother       Social History:  reports that she has quit smoking. Her smoking use included cigarettes. She has a 2.00 pack-year smoking history. She has never used smokeless tobacco.   reports no history of alcohol use.   reports no history of drug use.        Review of Systems  Complete ROS including constitutional, skin, HENT, Eyes, CV, Resp, GI, , MS, Endo/heme/allergies, Neuro, and Psych is negative unless otherwise indicated above or detailed in HPI      Objective     Vital Signs Range for the last 24 hours  Temperature: Temp:  [97.9 °F (36.6 °C)] 97.9 °F (36.6 °C)   Temp Source: Temp src: Oral   BP: BP: (122)/(71) 122/71   Pulse: Heart Rate:  [105] 105   Respirations: Resp:  [18] 18   Weight: Weight:  [81.6 kg (180 lb)] 81.6 kg (180 lb)     Physical Examination:    Gen: NAD   CV: RRR   Resp: CTAB, normal work of breathing on RA   Abd: soft, nontender, gravid   Extr: no edema bilaterally      Cervix: 4-5/80/bulging bag   SSE: no pooling, no leakage with valsalva  Presentation: Vertex on BSUS     NST: 135/mod variability/+ accels/- decels  Thornhill: 3ctx/10min    US Fetal Biophysical Profile;Without Non-Stress Testing    Result Date: 2022: 1. OBSTETRIC ULTRASOUND THIRD TRIMESTER. 2. FETAL BIOPHYSICAL PROFILE. INDICATION: 21-year-old female, pregnant in 3rd trimester, presenting with  labor, decreased fetal movement. Assess fetal growth and well-being. TECHNIQUE: Transabdominal obstetric ultrasound examination. GENERAL EVALUATION: *  Cardiac activity: Present. 146 bpm. *  Presentation: Cephalic. *  Placenta site: Posterior. *  Amniotic fluid: BRANDON 9.4 cm. DVP 3.4 cm. FETAL BIOMETRY: BPD: 34 weeks, 6 days. HC: 34 weeks, 3 days. AC: 33 weeks, 3 days. FL: 31 weeks, 4 days. Fetal age by previous ultrasound: Unavailable. Fetal age by  current ultrasound: 32 weeks, 6 days, JAME 2022. FETAL WEIGHT CALCULATION: EFW: 2126 g (4 pounds, 11 ounces). Percentile: 37th percentile. EFW by Hadlock [BPD-HC-AC-FL]. ________________________________________ BIOPHYSICAL PROFILE: Fetal Breathing Movements (FBM): 2/2 Gross Body Movements (GBM): 2/2 Fetal Tone (FT): 2/2 Amniotic Fluid Volume (AFV): 2/2 TOTAL SCORE:  ________________________________________     1.  Single living fetus at 32 weeks, 6 days in cephalic position. 2.  Fetal heart rate measures 146 bpm. 3.  AFV is within normal limits. 4.  Placenta is posterior. 5.  Normal fetal biophysical profile: . Signer Name: Blade Kennedy MD  Signed: 2022 10:24 AM  Workstation Name: [facility]  [facility]    US Fetal Biophysical Profile;Without Non-Stress Testing    Result Date: 2022  EXAMINATION: US FETAL BIOPHYSICAL PROFILE;WITHOUT NON-STRESS TESTING-  CLINICAL INDICATION: PTL   COMPARISON: 2017  FINDINGS: Transabdominal sonographic imaging of the pregnant pelvis.  Single living intrauterine pregnancy in the breech lie.  Placenta is anterior.  Estimated age 30 weeks 6 days.  Biophysical profile score 8 out of 8.      Single living intrauterine pregnancy with biophysical profile score 8 out of 8.  This report was finalized on 2022 3:35 PM by Dr. Agustín Liu MD.        Assessment/Plan:  Pt is 21 y.o.  33w2d admitted with  labor  1. Admit to L&D  2. Will initiate rescue course of steroids and will give procardia for tocolysis while on BMZ  3. GBS pos - PCN for ppx - will discontinue once cervical exam stabilizes  4. q6hr glucose checks with insulin sliding scale ordered   5. BID NST      Clara Ace MD  2022  08:05 EDT      Electronically signed by Clara Ace MD at 22 1104     H&P signed by [employer] Onbase, [place] at 22 0828      Scan on 2022 by [employer], [place]: PRENATAL RECORDS, [facility]  [facility], 07/16/2022          Electronically signed by [employer] On[employer], [place] at 07/16/22 0828     H&P signed by [employer] On[employer], [place] at 07/16/22 0828      Scan on 7/16/2022 by [employer] On[employer], [place]: PRENATAL RECORDS, [facility], 07/16/2022          Electronically signed by [employer] Onbase, [place] at 07/16/22 0828       Emergency Department Notes    No notes of this type exist for this encounter.           Current Facility-Administered Medications   Medication Dose Route Frequency Provider Last Rate Last Admin   • acetaminophen (TYLENOL) tablet 650 mg  650 mg Oral Q4H PRN Clara Ace MD       • betamethasone acetate-betamethasone sodium phosphate (CELESTONE SOLUSPAN) injection 12 mg  12 mg Intramuscular Q24H Clara Ace MD   12 mg at 07/16/22 0619   • calcium carbonate (TUMS) chewable tablet 500 mg (200 mg elemental)  2 tablet Oral Daily PRN Clara Ace MD       • dextrose (D50W) (25 g/50 mL) IV injection 25 g  25 g Intravenous Q15 Min PRN Clara Ace MD       • dextrose (GLUTOSE) oral gel 15 g  15 g Oral Q15 Min PRN Clara Ace MD       • glucagon (human recombinant) (GLUCAGEN DIAGNOSTIC) injection 1 mg  1 mg Intramuscular Q15 Min PRN Clara Ace MD       • hydrOXYzine (ATARAX) tablet 50 mg  50 mg Oral Nightly PRN Clara Ace MD       • Insulin Aspart (novoLOG) injection 0-7 Units  0-7 Units Subcutaneous Q6H Clara Ace MD       • lactated ringers infusion  125 mL/hr Intravenous Continuous Clara Ace  mL/hr at 07/16/22 1006 125 mL/hr at 07/16/22 1006   • NIFEdipine (PROCARDIA) capsule 10 mg  10 mg Oral Q8H Clara Ace MD   10 mg at 07/16/22 1005   • ondansetron (ZOFRAN) tablet 4 mg  4 mg Oral Q8H PRN Clara Ace MD        Or   • ondansetron (ZOFRAN) injection 4 mg  4 mg Intravenous Q8H PRN Latasha Acen, MD       • penicillin G potassium 3 Million Units in sodium chloride 0.9 %  "100 mL IVPB  3 Million Units Intravenous Q4H Clara Ace MD   3 Million Units at 07/16/22 1005   • prenatal vitamin tablet 1 tablet  1 tablet Oral Daily Clara Ace MD   1 tablet at 07/16/22 0950   • sodium chloride 0.9 % flush 10 mL  10 mL Intravenous PRN Clara Ace MD         Orders (last 24 hrs)      Start     Ordered    07/16/22 1400  NIFEdipine (PROCARDIA) capsule 10 mg  Every 8 Hours Scheduled,   Status:  Discontinued         07/16/22 0810    07/16/22 1200  POC Glucose Q6H  Every 6 Hours       07/16/22 0804    07/16/22 1100  penicillin G potassium 3 Million Units in sodium chloride 0.9 % 100 mL IVPB  Every 4 Hours        \"Followed by\" Linked Group Details    07/16/22 0550    07/16/22 1037  Antibody Identification  Once         07/16/22 1036    07/16/22 0930  NIFEdipine (PROCARDIA) capsule 10 mg  Every 8 Hours Scheduled         07/16/22 0836    07/16/22 0900  prenatal vitamin tablet 1 tablet  Daily         07/16/22 0634    07/16/22 0900  Insulin Aspart (novoLOG) injection 0-7 Units  Every 6 Hours         07/16/22 0804    07/16/22 0900  sodium chloride 0.9 % flush 10 mL  Every 12 Hours Scheduled,   Status:  Discontinued         07/16/22 0810    07/16/22 0900  magnesium citrate solution 300 mL  Once,   Status:  Discontinued         07/16/22 0810    07/16/22 0810  hydrOXYzine (ATARAX) tablet 50 mg  Nightly PRN         07/16/22 0810    07/16/22 0810  Place Sequential Compression Device  Once         07/16/22 0810    07/16/22 0810  Maintain Sequential Compression Device  Continuous         07/16/22 0810    07/16/22 0810  Diet Clear Liquid  Diet Effective Now         07/16/22 0810    07/16/22 0810  ondansetron (ZOFRAN) tablet 4 mg  Every 8 Hours PRN        \"Or\" Linked Group Details    07/16/22 0810    07/16/22 0810  ondansetron (ZOFRAN) injection 4 mg  Every 8 Hours PRN        \"Or\" Linked Group Details    07/16/22 0810    07/16/22 0809  calcium carbonate (TUMS) chewable tablet 500 mg " (200 mg elemental)  Daily PRN         22 0810    22 0809  acetaminophen (TYLENOL) tablet 650 mg  Every 4 Hours PRN         22 0810    22 0809  Admit To Obstetrics Inpatient  Once         22 0810    22 08  Vital Signs Per Hospital Policy  Per Hospital Policy         22 0810    22 08  Intermittent Auscultation FOR LOW RISK PATIENTS - NST on Admission Along With Intermittent Auscultation of Fetal Heart Tones.  Per Order Details        Comments: Intermittent Auscultation FOR LOW RISK PATIENTS - NST on Admission Along With Intermittent Auscultation of Fetal Heart Tones.    22 0810    22 0809  For Antepartum Patients Greater Than 24 Weeks - NST Daily and Monitor Fetal Heart Tones for One Hour 3 Times Daily and PRN.  Per Order Details        Comments: For Antepartum Patients Greater Than 24 Weeks - NST Daily & Monitor Fetal Heart Tones For One Hour 3 Times Daily & PRN.    22 0810    22 0809  For Antepartum Patients Less Than 24 Weeks - Document Fetal Heart Tones Daily and PRN.  Per Order Details        Comments: For Antepartum Patients Less Than 24 Weeks - Document Fetal Heart Tones Daily & PRN.    22 0810    22 0809  Notify Provider (Specified)  Until Discontinued         22 0810    22 08  Notify Provider of Tachysystole (Per Hospital Algorithm)  Until Discontinued         22 0810    22 0809  Notify Provider if Membranes Ruptured, Bleeding Greater Than 1 Pad Per Hour, Fetal Heart Tone Abnormality or Severe Pain  Until Discontinued         22 0810    22 0809  Initiate Group Beta Strep (GBS) Prophylaxis Protocol, If Criteria Met  Continuous        Comments: NO TREATMENT RECOMMENDED IF: 1) Maternal GBS Status Known Negative 2) Scheduled  Birth With Intact Membranes, Not in Labor 3) Maternal GBS Status Unknown, No Risk Factors  TREAT WITH ANTIBIOTICS IF:  1) Maternal GBS Status Known Positive  2) Maternal GBS Status Unknown With Risk Factors: a)  Previous Infant Affected By GBS Infection b) GBS Urinary Tract Infection (UTI) or Bacteriuria During Pregnancy c) Unexplained Maternal Fever (100.4F (38C) or Greater) During Labor d)  Prolonged Rupture re of Membranes (18 or More Hours) e) Gestational Age Less Than 37 Weeks    07/16/22 0810    07/16/22 0809  CBC (No Diff)  Once         07/16/22 0810    07/16/22 0809  Type & Screen  Once         07/16/22 0810    07/16/22 0809  Insert Peripheral IV  Once         07/16/22 0810    07/16/22 0809  Saline Lock & Maintain IV Access  Continuous         07/16/22 0810    07/16/22 0808  sodium chloride 0.9 % flush 10 mL  As Needed         07/16/22 0810    07/16/22 0808  lidocaine PF 1% (XYLOCAINE) injection 5 mL  As Needed,   Status:  Discontinued         07/16/22 0810    07/16/22 0803  Do NOT Hold Basal or Correction Scale Insulin When Patient is NPO, Hold Scheduled Mealtime (Bolus) Insulin if NPO  Continuous         07/16/22 0804    07/16/22 0803  Follow [facility] Hypoglycemia Standing Orders For Blood Glucose Less Than 70 mg/dL  Until Discontinued        Comments: ALERT PATIENT - NOT NPO & CAN SAFELY SWALLOW  Administer 4 oz Fruit Juice OR 4 oz Regular Soda OR 8 oz Milk OR 15-30 grams (1 tube) of Glucose Gel.  Recheck Blood Glucose Approximately 15 Minutes After Ingestion, Repeat Treatment & Continue to Recheck Blood Sugar Approximately Every 15 Minutes Until Blood Glucose is 70 or Higher.  Once Blood Glucose is 70 or Higher & if It Will Be More Than 60 Minutes Until Next Meal, Provide Appropriate Snack (Including Carbohydrate Food) Based on Meal Plan Orde nicki. Give Meal Tray As Soon As Possible.    PATIENT HAS IV ACCESS - UNRESPONSIVE, NPO OR UNABLE TO SAFELY SWALLOW  Administer 25g (50ml) D50W IV Push.  Recheck Blood Glucose Approximately 15 Minutes After Administration, if Blood Glucose Remains Less Than 70, Repeat Treatment   Recheck Blood Glucose Approximately 15 Minutes  "After 2nd Administration, if Blood Glucose Remains Less Than 70 After 2nd Dose of D50W, Contact Provider for Further Treatment Orders & Consider Adding IVF With D5W for Mainte tenance    PATIENT WITHOUT IV ACCESS - UNRESPONSIVE, NPO OR UNABLE TO SAFELY SWALLOW  Administer 1mg Glucagon SQ & Establish IV Access.  Turn Patient on Side - Nausea / Vomiting May Occur.  Recheck Blood Glucose Approximately 15 Minutes After Administration.  If Blood Glucose Remains Less Than 70, Administer 25g D50W IV Push (50ml).  Recheck Blood Glucose Approximately 15 Minutes After Administration of D50W, if Blood Glucose Remains Less Than 70, Contact Provider for Further Treatment Orders & C  Consider Adding IVF With D5 for Maintenance    Document Event & Patient Response to Interventions in EMR, Document Medications on MAR  Notify Provider if Hypoglycemia Treatment Needed    07/16/22 0804    07/16/22 0802  dextrose (GLUTOSE) oral gel 15 g  Every 15 Minutes PRN         07/16/22 0804    07/16/22 0802  dextrose (D50W) (25 g/50 mL) IV injection 25 g  Every 15 Minutes PRN         07/16/22 0804    07/16/22 0802  glucagon (human recombinant) (GLUCAGEN DIAGNOSTIC) injection 1 mg  Every 15 Minutes PRN         07/16/22 0804    07/16/22 0711  US Fetal Biophysical Profile;Without Non-Stress Testing  1 Time Imaging         07/16/22 0712    07/16/22 0645  betamethasone acetate-betamethasone sodium phosphate (CELESTONE SOLUSPAN) injection 12 mg  Every 24 Hours         07/16/22 0550    07/16/22 0645  penicillin G potassium 5 Million Units in sodium chloride 0.9 % 100 mL IVPB  Once        \"Followed by\" Linked Group Details    07/16/22 0550    07/16/22 0645  lactated ringers infusion  Continuous         07/16/22 0550    07/16/22 0632  NIFEdipine CC (ADALAT CC) 24 hr tablet 30 mg  Daily PRN,   Status:  Discontinued         07/16/22 0634    07/16/22 0609  terbutaline (BRETHINE) 1 MG/ML injection  - ADS Override Pull        Note to Pharmacy: Created by " cabinet override    07/16/22 0609    07/16/22 0549  Fetal Nonstress Test  Once        Comments: Patient presents with:  Contractions      07/16/22 0550    07/16/22 0549  COVID PRE-OP / PRE-PROCEDURE SCREENING ORDER (NO ISOLATION) - Swab, Nasopharynx  Once         07/16/22 0550    07/16/22 0549  COVID-19,CEPHEID/JOSE,COR/BOBBY/PAD/ERNESTINA IN-HOUSE(OR EMERGENT/ADD-ON),NP SWAB IN TRANSPORT MEDIA 3-4 HR TAT, RT-PCR - Swab, Nasopharynx  PROCEDURE ONCE         07/16/22 0550    07/16/22 0547  Initiate Observation Status  Once         07/16/22 0550    07/16/22 0536  Urinalysis, Microscopic Only - Urine, Clean Catch  Once         07/16/22 0535    07/16/22 0536  Urine Culture - Urine, Urine, Clean Catch  Once         07/16/22 0535    07/16/22 0521  Urinalysis With Culture If Indicated - Urine, Clean Catch  STAT         07/16/22 0520    07/16/22 0521  Urine Drug Screen - Urine, Clean Catch  STAT         07/16/22 0520    Unscheduled  Position Change - For Intra-Uterine Resusitation for Hypertonus, HyperStimulation or Non-Reassuring Fetal Status  As Needed       07/16/22 0810                Physician Progress Notes (last 24 hours)  Notes from 07/15/22 1430 through 07/16/22 1430   No notes of this type exist for this encounter.         Consult Notes (last 24 hours)  Notes from 07/15/22 1430 through 07/16/22 1430   No notes of this type exist for this encounter.

## 2022-07-16 NOTE — PLAN OF CARE
Goal Outcome Evaluation:  Plan of Care Reviewed With: patient, spouse        Progress: improving  Outcome Evaluation: IV INFUSING AO PER PUMP WITH NO REDNESS NOTED TO SITE.  MEDS GIVEN AO.  PT TOLERATED CLEAR DIET.

## 2022-07-16 NOTE — NON STRESS TEST
Bianca Nieves, a  at 33w2d with an JAME of 2022, by Ultrasound, was seen at [facility] LABOR DELIVERY for a nonstress test.    Chief Complaint   Patient presents with   • Contractions       Patient Active Problem List   Diagnosis   • Right upper quadrant abdominal pain   • Leukocytosis   • Abdominal pain affecting pregnancy   • Pregnancy   • Abdominal pain during pregnancy   • Visit for wound check   • UTI (urinary tract infection)   •  labor       Start Time:   Stop Time:     Interpretation A  Nonstress Test Interpretation A: Reactive  Comments A: Verified by Nithya العراقي RN

## 2022-07-17 LAB
BACTERIA SPEC AEROBE CULT: ABNORMAL
GLUCOSE BLDC GLUCOMTR-MCNC: 112 MG/DL (ref 70–130)
GLUCOSE BLDC GLUCOMTR-MCNC: 128 MG/DL (ref 70–130)
GLUCOSE BLDC GLUCOMTR-MCNC: 134 MG/DL (ref 70–130)

## 2022-07-17 PROCEDURE — 25010000002 BETAMETHASONE ACET & SOD PHOS PER 4 MG: Performed by: OBSTETRICS & GYNECOLOGY

## 2022-07-17 PROCEDURE — 82962 GLUCOSE BLOOD TEST: CPT

## 2022-07-17 PROCEDURE — 59025 FETAL NON-STRESS TEST: CPT

## 2022-07-17 PROCEDURE — 0 PENICILLIN G POTASSIUM PER 600000 UNITS: Performed by: OBSTETRICS & GYNECOLOGY

## 2022-07-17 PROCEDURE — 25010000002 MORPHINE PER 10 MG: Performed by: OBSTETRICS & GYNECOLOGY

## 2022-07-17 PROCEDURE — 25010000002 PENICILLIN G POTASSIUM PER 600000 UNITS: Performed by: OBSTETRICS & GYNECOLOGY

## 2022-07-17 RX ORDER — INSULIN ASPART 100 [IU]/ML
0-7 INJECTION, SOLUTION INTRAVENOUS; SUBCUTANEOUS
Status: DISCONTINUED | OUTPATIENT
Start: 2022-07-17 | End: 2022-07-18

## 2022-07-17 RX ADMIN — SODIUM CHLORIDE 3 MILLION UNITS: 900 INJECTION INTRAVENOUS at 00:00

## 2022-07-17 RX ADMIN — SODIUM CHLORIDE, POTASSIUM CHLORIDE, SODIUM LACTATE AND CALCIUM CHLORIDE 125 ML/HR: 600; 310; 30; 20 INJECTION, SOLUTION INTRAVENOUS at 14:55

## 2022-07-17 RX ADMIN — NIFEDIPINE 10 MG: 10 CAPSULE ORAL at 14:54

## 2022-07-17 RX ADMIN — BETAMETHASONE SODIUM PHOSPHATE AND BETAMETHASONE ACETATE 12 MG: 3; 3 INJECTION, SUSPENSION INTRA-ARTICULAR; INTRALESIONAL; INTRAMUSCULAR; SOFT TISSUE at 06:14

## 2022-07-17 RX ADMIN — MORPHINE SULFATE 2 MG: 2 INJECTION, SOLUTION INTRAMUSCULAR; INTRAVENOUS at 16:52

## 2022-07-17 RX ADMIN — NIFEDIPINE 10 MG: 10 CAPSULE ORAL at 06:14

## 2022-07-17 RX ADMIN — FAMOTIDINE 20 MG: 20 TABLET, FILM COATED ORAL at 08:24

## 2022-07-17 RX ADMIN — NIFEDIPINE 10 MG: 10 CAPSULE ORAL at 21:51

## 2022-07-17 RX ADMIN — SODIUM CHLORIDE 3 MILLION UNITS: 900 INJECTION INTRAVENOUS at 06:15

## 2022-07-17 RX ADMIN — PRENATAL VIT W/ FE FUMARATE-FA TAB 27-0.8 MG 1 TABLET: 27-0.8 TAB at 08:24

## 2022-07-17 RX ADMIN — SODIUM CHLORIDE 3 MILLION UNITS: 900 INJECTION INTRAVENOUS at 04:00

## 2022-07-17 RX ADMIN — SODIUM CHLORIDE 3 MILLION UNITS: 900 INJECTION INTRAVENOUS at 11:26

## 2022-07-17 NOTE — PLAN OF CARE
Goal Outcome Evaluation:   IV infusing AO per pump with no redness noted to site.  Medication given ao.  pt. Tolerated clear diet this shift.

## 2022-07-17 NOTE — PROGRESS NOTES
OB Progress Note      Hospital Course:  . Patient was admitted on 2022  5:05 AM with IUP at 33w3d weeks gestation secondary to  labor.  Upon arrival, she was started on course of rescue BMZ and procardia at rec of [facility]. She has been receiving PCN for GBS ppx (GBS prev positive).     Pt reports that ctx remain intermittent, not worsening. Denies VB/LOF. Active FM.   Denies HA/VC/RUQ Pain/CP/SOB.     signs in last 24 hours:    Vital Signs Range for the last 24 hours              Temp:  [97.9 °F (36.6 °C)-98.5 °F (36.9 °C)] 97.9 °F (36.6 °C)  Heart Rate:  [] 77  Resp:  [20] 20  BP: (102-131)/(59-66) 102/61     Radiology     Imaging Results (Last 24 Hours)     ** No results found for the last 24 hours. **           Labs     Lab Results (last 24 hours)     Procedure Component Value Units Date/Time    Urine Culture - Urine, Urine, Clean Catch [[id number]]  (Abnormal) Collected: 22    Specimen: Urine, Clean Catch Updated: 22     Urine Culture >100,000 CFU/mL Staphylococcus, coagulase negative    Narrative:      By laboratory criteria, additional testing is not indicated and unlikely to produce clinically relevant information. Coagulase negative staphylococci are common skin contaminants, members of the oral aruna, and of low virulence; requires clinical correlation.  Colonization of the urinary tract without infection is common. Treatment is discouraged unless the patient is symptomatic, pregnant, or undergoing an invasive urologic procedure.    POC Glucose Once [[id number]]  (Normal) Collected: 22 0900    Specimen: Blood Updated: 22 0910     Glucose 128 mg/dL      Comment: Meter: [id number] : [id number] Layla Gutierres       POC Glucose Once [[id number]]  (Normal) Collected: 22 0612    Specimen: Blood Updated: 22 0623     Glucose 112 mg/dL      Comment: Meter: [id number] : [id number] John FLORIAN       POC Glucose Once [[id number]]  (Abnormal)  Collected: 07/16/22 2244    Specimen: Blood Updated: 07/16/22 2251     Glucose 133 mg/dL      Comment: Meter: [id number] : [id number] TRIP LERNER       HIV-1 & HIV-2 Antibodies [[id number]]  (Normal) Collected: 07/16/22 2016    Specimen: Blood Updated: 07/16/22 2134    Narrative:      The following orders were created for panel order HIV-1 & HIV-2 Antibodies.  Procedure                               Abnormality         Status                     ---------                               -----------         ------                     HIV-1 / O / 2 Ag / Antib...[[id number]]  Normal              Final result                 Please view results for these tests on the individual orders.    HIV-1 / O / 2 Ag / Antibody 4th Generation [[id number]]  (Normal) Collected: 07/16/22 2016    Specimen: Blood Updated: 07/16/22 2134     HIV-1/ HIV-2 Non-Reactive     Comment: A non-reactive test result does not preclude the possibility of exposure to HIV or infection with HIV. An antibody response to recent exposure may take several months to reach detectable levels.       Narrative:      The HIV antibody/antigen combo assay is a qualitative assay for HIV that includes the p24 antigen as well as antibodies to HIV types 1 and 2. This test is intended to be used as a screening assay in the diagnosis of HIV infection in patients over the age of 2.    HIV-1 Antibody, EIA [[id number]] Resulted: 02/28/22    Specimen: Blood Updated: 07/16/22 2000     External HIV Antibody Non-Reactive    POC Glucose Once [[id number]]  (Abnormal) Collected: 07/16/22 1522    Specimen: Blood Updated: 07/16/22 1527     Glucose 132 mg/dL      Comment: Meter: [id number] : [id number] Dianne Zelaya A               Review of Systems    Per HPI      Physical Exam:      General Appearance:    Alert, cooperative, in no acute distress   Head:    Normocephalic, without obvious abnormality, atraumatic   Lungs:     Clear to auscultation,respirations regular, even and                    unlabored    Heart:    Regular rhythm and normal rate, normal S1 and S2, no            murmur, no gallop, no rub, no click   Breast Exam:    Deferred   Abdomen:     soft        non-tender, gravid uterus, no guarding, no rebound                 tenderness   Genitalia:    Cvx: 5.5/50/-3   Extremities:   Moves all extremities well, no edema, no cyanosis, no              redness   Skin:   No bleeding, bruising or rash      BSUS: cephalic     Assessment:  1.  Patient doing better with PTL, stable over repeat exam    Plan:  1. Continue inpatient management  2. BMZ complete at 0615 on 7/18/22 - continue procardia until BMZ complete   3. GBS pos - given stable cervical exam, will d/c PCN but will need to restart if cervical change  4. BID NST  5. Diabetic diet   5. A1GDM - continue POC glucose fasting and 2hr postprandials with sliding scale insulin.       Clara Ace MD  07/17/22  13:34 EDT

## 2022-07-17 NOTE — NON STRESS TEST
Bianca Nieves, a  at 33w3d with an JAME of 2022, by Ultrasound, was seen at [facility] LABOR DELIVERY for a nonstress test.    Chief Complaint   Patient presents with   • Contractions       Patient Active Problem List   Diagnosis   • Right upper quadrant abdominal pain   • Leukocytosis   • Abdominal pain affecting pregnancy   • Pregnancy   • Abdominal pain during pregnancy   • Visit for wound check   • UTI (urinary tract infection)   •  labor       Start Time: 928  Stop Time: 944    Interpretation A  Nonstress Test Interpretation A: Reactive  Comments A: Verified by iNthya العراقي RN

## 2022-07-17 NOTE — PHARMACY PATIENT ASSISTANCE
Pharmacy checked on cost of Nifedipine 10 mg tablets in the apothecary. PA was required. Sent PA and was approved with a $0 co-pay.     Thank you,   Alicia Doty, Pharmacy Intern  07/17/22  14:10 EDT

## 2022-07-18 ENCOUNTER — HOSPITAL ENCOUNTER (OUTPATIENT)
Facility: HOSPITAL | Age: 22
End: 2022-07-18
Attending: OBSTETRICS & GYNECOLOGY | Admitting: OBSTETRICS & GYNECOLOGY

## 2022-07-18 VITALS
DIASTOLIC BLOOD PRESSURE: 53 MMHG | SYSTOLIC BLOOD PRESSURE: 98 MMHG | BODY MASS INDEX: 29.99 KG/M2 | HEART RATE: 96 BPM | WEIGHT: 180 LBS | TEMPERATURE: 97.9 F | HEIGHT: 65 IN | RESPIRATION RATE: 18 BRPM

## 2022-07-18 PROBLEM — O47.03 THREATENED PRETERM LABOR, THIRD TRIMESTER: Status: ACTIVE | Noted: 2022-07-18

## 2022-07-18 PROBLEM — Z3A.33 33 WEEKS GESTATION OF PREGNANCY: Status: ACTIVE | Noted: 2022-07-18

## 2022-07-18 PROCEDURE — 59025 FETAL NON-STRESS TEST: CPT

## 2022-07-18 RX ADMIN — PRENATAL VIT W/ FE FUMARATE-FA TAB 27-0.8 MG 1 TABLET: 27-0.8 TAB at 09:00

## 2022-07-18 NOTE — NON STRESS TEST
Bianca Nieves, a  at 33w4d with an JAME of 2022, by Ultrasound, was seen at [facility] LABOR DELIVERY for a nonstress test.    Chief Complaint   Patient presents with   • Contractions       Patient Active Problem List   Diagnosis   • Right upper quadrant abdominal pain   • Leukocytosis   • Abdominal pain affecting pregnancy   • Pregnancy   • Abdominal pain during pregnancy   • Visit for wound check   • UTI (urinary tract infection)   •  labor       Start Time: 0845  Stop Time: 0900    Interpretation A  Nonstress Test Interpretation A: Reactive  Comments A: Verified by Nithya العراقي RN  Interpretation B  Nonstress Test Interpretation B: Reactive

## 2022-07-18 NOTE — PAYOR COMM NOTE
"[facility]  CIELO NEUMANN  PHONE  [phone]  FAX  [phone]  NPI:  [id number]    PATIENT D/C 2022    Jasmin Ventura (21 y.o. Female)             Date of Birth   2000    Social Security Number       Address   [address] [facility] [address]    Home Phone   [phone]    MRN   [id number]       Congregational   [employer]    Marital Status   Single                            Admission Date   22    Admission Type   Elective    Admitting Provider   Clara Ace MD    Attending Provider       Department, Room/Bed   [facility] LABOR DELIVERY, L227/1       Discharge Date   2022    Discharge Disposition   Home or Self Care    Discharge Destination                               Attending Provider: (none)   Allergies: No Known Allergies    Isolation: None   Infection: None   Code Status: CPR   Advance Care Planning Activity    Ht: 165.1 cm (65\")   Wt: 81.6 kg (180 lb)    Admission Cmt: None   Principal Problem: Threatened  labor, third trimester [O47.03]                 Active Insurance as of 2022     Primary Coverage     Payor Plan Insurance Group Employer/Plan Group    [employer] [place] [facility] [place]      Payor Plan Address Payor Plan Phone Number Payor Plan Fax Number Effective Dates    [address]   2016 - None Entered    [place] [facility] [address]       Subscriber Name Subscriber Birth Date Member ID       JASMIN VENTURA 2000 [id number]                 Emergency Contacts      (Rel.) Home Phone Work Phone Mobile Phone    Bonnie Neumann (Mother) [phone] -- [phone]               Discharge Summary      Terell Gilbert MD at 22 1020            Date of Discharge: 22     Discharge Diagnosis:     Threatened  labor, third trimester     labor    33 weeks gestation of pregnancy    Problem List:    Threatened  labor, third trimester     labor    33 weeks gestation of " pregnancy      Presenting Problem/History of Present Illness  Pregnancy [Z34.90]   labor [O60.00]      Hospital Course  Patient is a 21 y.o. female presented with the above diagnosis cervix unchanged since admission, stable and FHR reactive and reassuring. No contractions,  okay to discharge home today.   Procedures Performed         Consults:   Consults     Date and Time Order Name Status Description    2022  4:09 PM Inpatient Anesthesiology Consult            Pertinent Test Results:    Condition on Discharge: Stable  Vital Signs  Temp:  [97.9 °F (36.6 °C)-98.1 °F (36.7 °C)] 97.9 °F (36.6 °C)  Heart Rate:  [82-96] 96  Resp:  [18] 18  BP: ()/(53-68) 98/53    Physical Exam:     Constitutional:  Well-developed and well-nourished.  No respiratory distress.      HENT:  Head:  Normocephalic and atraumatic.  Mouth:  Moist mucous membranes.    Eyes:  Conjunctivae and EOM are normal.  No scleral icterus.    Neck:  Neck supple.  No JVD present.    Cardiovascular:  Normal rate, regular rhythm and normal heart sounds with no murmur. No edema.  Pulmonary/Chest:  No respiratory distress, no wheezes, no crackles, with normal breath sounds and good air movement.  Abdominal:  Soft.  Bowel sounds are normal.  No distension and no tenderness.   Musculoskeletal:  No edema, no tenderness, and no deformity.  No red or swollen joints anywhere.    Neurological:  Alert and oriented to person, place, and time. No facial droop.  No slurred speech.   Skin:  Skin is warm and dry. No rash noted. No pallor.   Pelvic Exam: Cx: 3/60/-3     Home or Self Care    Discharge Medications     Discharge Medications      Continue These Medications      Instructions Start Date   Prenatal Adult Gummy/DHA/FA 0.4-25 MG chewable tablet   1 each, Oral, Daily         ASK your doctor about these medications      Instructions Start Date   NIFEdipine CC 30 MG 24 hr tablet  Commonly known as: ADALAT CC   30 mg, Oral, Daily             Discharge Diet    Regular    Activity at Discharge  Pelvic rest  Regular diet  Follow up in the office 1 week.     Follow-up Appointments  1 week with BPP.       Time: Discharge 30 min    Electronically signed by Terell Gilbert MD at 07/18/22 5009

## 2022-07-18 NOTE — DISCHARGE SUMMARY
Date of Discharge: 22     Discharge Diagnosis:     Threatened  labor, third trimester     labor    33 weeks gestation of pregnancy    Problem List:    Threatened  labor, third trimester     labor    33 weeks gestation of pregnancy      Presenting Problem/History of Present Illness  Pregnancy [Z34.90]   labor [O60.00]      Hospital Course  Patient is a 21 y.o. female presented with the above diagnosis cervix unchanged since admission, stable and FHR reactive and reassuring. No contractions,  okay to discharge home today.   Procedures Performed         Consults:   Consults     Date and Time Order Name Status Description    2022  4:09 PM Inpatient Anesthesiology Consult            Pertinent Test Results:    Condition on Discharge: Stable  Vital Signs  Temp:  [97.9 °F (36.6 °C)-98.1 °F (36.7 °C)] 97.9 °F (36.6 °C)  Heart Rate:  [82-96] 96  Resp:  [18] 18  BP: ()/(53-68) 98/53    Physical Exam:     Constitutional:  Well-developed and well-nourished.  No respiratory distress.      HENT:  Head:  Normocephalic and atraumatic.  Mouth:  Moist mucous membranes.    Eyes:  Conjunctivae and EOM are normal.  No scleral icterus.    Neck:  Neck supple.  No JVD present.    Cardiovascular:  Normal rate, regular rhythm and normal heart sounds with no murmur. No edema.  Pulmonary/Chest:  No respiratory distress, no wheezes, no crackles, with normal breath sounds and good air movement.  Abdominal:  Soft.  Bowel sounds are normal.  No distension and no tenderness.   Musculoskeletal:  No edema, no tenderness, and no deformity.  No red or swollen joints anywhere.    Neurological:  Alert and oriented to person, place, and time. No facial droop.  No slurred speech.   Skin:  Skin is warm and dry. No rash noted. No pallor.   Pelvic Exam: Cx: 3/60/-3     Home or Self Care    Discharge Medications     Discharge Medications      Continue These Medications      Instructions Start Date   Prenatal  Adult Gummy/DHA/FA 0.4-25 MG chewable tablet   1 each, Oral, Daily         ASK your doctor about these medications      Instructions Start Date   NIFEdipine CC 30 MG 24 hr tablet  Commonly known as: ADALAT CC   30 mg, Oral, Daily             Discharge Diet   Regular    Activity at Discharge  Pelvic rest  Regular diet  Follow up in the office 1 week.     Follow-up Appointments  1 week with BPP.       Time: Discharge 30 min

## 2022-07-18 NOTE — NON STRESS TEST
Bianca MUSTAPHA Ezequiel, a  at 33w3d with an JAME of 2022, by Ultrasound, was seen at [facility] LABOR DELIVERY for a nonstress test.    Chief Complaint   Patient presents with   • Contractions       Patient Active Problem List   Diagnosis   • Right upper quadrant abdominal pain   • Leukocytosis   • Abdominal pain affecting pregnancy   • Pregnancy   • Abdominal pain during pregnancy   • Visit for wound check   • UTI (urinary tract infection)   •  labor       Start Time:   Stop Time:     Interpretation A  Nonstress Test Interpretation A: Reactive  Comments A: Verified by Nithya العراقي RN

## 2022-07-18 NOTE — DISCHARGE INSTR - APPOINTMENTS
Keep scheduled appointment on Friday they are adding a BPP/ultrasound onto your appointment on Friday

## 2022-07-18 NOTE — PLAN OF CARE
Problem: Adult Inpatient Plan of Care  Goal: Plan of Care Review  Outcome: Ongoing, Progressing  Goal: Patient-Specific Goal (Individualized)  Outcome: Ongoing, Progressing  Goal: Absence of Hospital-Acquired Illness or Injury  Outcome: Ongoing, Progressing  Goal: Optimal Comfort and Wellbeing  Outcome: Ongoing, Progressing  Goal: Readiness for Transition of Care  Outcome: Ongoing, Progressing     Problem: Maternal-Fetal Wellbeing  Goal: Optimal Maternal-Fetal Wellbeing  Outcome: Ongoing, Progressing     Problem:  Fall Injury Risk  Goal: Absence of Fall, Infant Drop and Related Injury  Outcome: Ongoing, Progressing     Problem: Bleeding (Labor)  Goal: Hemostasis  Outcome: Ongoing, Progressing     Problem: Change in Fetal Wellbeing (Labor)  Goal: Stable Fetal Wellbeing  Outcome: Ongoing, Progressing     Problem: Delayed Labor Progression (Labor)  Goal: Effective Progression to Delivery  Outcome: Ongoing, Progressing     Problem: Infection (Labor)  Goal: Absence of Infection Signs and Symptoms  Outcome: Ongoing, Progressing     Problem: Labor Pain (Labor)  Goal: Acceptable Pain Control  Outcome: Ongoing, Progressing     Problem: Uterine Tachysystole (Labor)  Goal: Normal Uterine Contraction Pattern  Outcome: Ongoing, Progressing     Problem:  Labor  Goal: Delayed  Delivery  Outcome: Ongoing, Progressing   Goal Outcome Evaluation:

## 2022-08-04 ENCOUNTER — PATIENT OUTREACH (OUTPATIENT)
Dept: LABOR AND DELIVERY | Facility: HOSPITAL | Age: 22
End: 2022-08-04

## 2022-08-04 NOTE — OUTREACH NOTE
[facility]  Unable to Reach       Questions/Answers    Flowsheet Row Responses   Pending Outreach Prenatal Check-in   Call Attempt First   Outcome Missing or invalid number   Unable to reach comments: Will send Hyletehart message.          Sent Yushino message.    Eli Bernal RN  Maternity Nurse Navigator    8/4/2022, 14:22 EDT

## 2022-08-17 ENCOUNTER — HOSPITAL ENCOUNTER (INPATIENT)
Facility: HOSPITAL | Age: 22
LOS: 2 days | Discharge: HOME | End: 2022-08-19
Attending: OBSTETRICS & GYNECOLOGY | Admitting: OBSTETRICS & GYNECOLOGY

## 2022-08-17 ENCOUNTER — ANESTHESIA (OUTPATIENT)
Dept: LABOR AND DELIVERY | Facility: HOSPITAL | Age: 22
End: 2022-08-17

## 2022-08-17 ENCOUNTER — ANESTHESIA EVENT (OUTPATIENT)
Dept: LABOR AND DELIVERY | Facility: HOSPITAL | Age: 22
End: 2022-08-17

## 2022-08-17 PROBLEM — Z3A.37 37 WEEKS GESTATION OF PREGNANCY: Status: ACTIVE | Noted: 2022-08-17

## 2022-08-17 PROBLEM — O60.03 PRETERM LABOR IN THIRD TRIMESTER: Status: ACTIVE | Noted: 2022-08-17

## 2022-08-17 PROBLEM — Z37.9 NORMAL LABOR: Status: ACTIVE | Noted: 2022-08-17

## 2022-08-17 LAB
ABO GROUP BLD: NORMAL
AMPHET+METHAMPHET UR QL: NEGATIVE
AMPHETAMINES UR QL: NEGATIVE
BACTERIA UR QL AUTO: ABNORMAL /HPF
BARBITURATES UR QL SCN: NEGATIVE
BASOPHILS # BLD AUTO: 0.04 10*3/MM3 (ref 0–0.2)
BASOPHILS NFR BLD AUTO: 0.3 % (ref 0–1.5)
BENZODIAZ UR QL SCN: NEGATIVE
BILIRUB UR QL STRIP: NEGATIVE
BLD GP AB SCN SERPL QL: POSITIVE
BUPRENORPHINE SERPL-MCNC: NEGATIVE NG/ML
CANNABINOIDS SERPL QL: NEGATIVE
CLARITY UR: ABNORMAL
COCAINE UR QL: NEGATIVE
COLOR UR: ABNORMAL
DEPRECATED RDW RBC AUTO: 45.7 FL (ref 37–54)
EOSINOPHIL # BLD AUTO: 0.01 10*3/MM3 (ref 0–0.4)
EOSINOPHIL NFR BLD AUTO: 0.1 % (ref 0.3–6.2)
ERYTHROCYTE [DISTWIDTH] IN BLOOD BY AUTOMATED COUNT: 14.4 % (ref 12.3–15.4)
GLUCOSE UR STRIP-MCNC: NEGATIVE MG/DL
HCT VFR BLD AUTO: 34.3 % (ref 34–46.6)
HGB BLD-MCNC: 11.7 G/DL (ref 12–15.9)
HGB UR QL STRIP.AUTO: ABNORMAL
HYALINE CASTS UR QL AUTO: ABNORMAL /LPF
IMM GRANULOCYTES # BLD AUTO: 0.09 10*3/MM3 (ref 0–0.05)
IMM GRANULOCYTES NFR BLD AUTO: 0.6 % (ref 0–0.5)
KETONES UR QL STRIP: ABNORMAL
LEUKOCYTE ESTERASE UR QL STRIP.AUTO: ABNORMAL
LYMPHOCYTES # BLD AUTO: 1.72 10*3/MM3 (ref 0.7–3.1)
LYMPHOCYTES NFR BLD AUTO: 11.2 % (ref 19.6–45.3)
MCH RBC QN AUTO: 29.6 PG (ref 26.6–33)
MCHC RBC AUTO-ENTMCNC: 34.1 G/DL (ref 31.5–35.7)
MCV RBC AUTO: 86.8 FL (ref 79–97)
METHADONE UR QL SCN: NEGATIVE
MONOCYTES # BLD AUTO: 0.59 10*3/MM3 (ref 0.1–0.9)
MONOCYTES NFR BLD AUTO: 3.8 % (ref 5–12)
NEUTROPHILS NFR BLD AUTO: 12.95 10*3/MM3 (ref 1.7–7)
NEUTROPHILS NFR BLD AUTO: 84 % (ref 42.7–76)
NITRITE UR QL STRIP: POSITIVE
NRBC BLD AUTO-RTO: 0 /100 WBC (ref 0–0.2)
OPIATES UR QL: NEGATIVE
OXYCODONE UR QL SCN: NEGATIVE
PCP UR QL SCN: NEGATIVE
PH UR STRIP.AUTO: 6 [PH] (ref 5–8)
PLATELET # BLD AUTO: 180 10*3/MM3 (ref 140–450)
PMV BLD AUTO: 10.4 FL (ref 6–12)
PROPOXYPH UR QL: NEGATIVE
PROT UR QL STRIP: ABNORMAL
RBC # BLD AUTO: 3.95 10*6/MM3 (ref 3.77–5.28)
RBC # UR STRIP: ABNORMAL /HPF
REF LAB TEST METHOD: ABNORMAL
RESIDUAL RHIG DETECTED: NORMAL
RH BLD: NEGATIVE
SARS-COV-2 RNA PNL SPEC NAA+PROBE: NOT DETECTED
SP GR UR STRIP: 1.02 (ref 1–1.03)
SQUAMOUS #/AREA URNS HPF: ABNORMAL /HPF
T&S EXPIRATION DATE: NORMAL
TRICYCLICS UR QL SCN: NEGATIVE
UROBILINOGEN UR QL STRIP: ABNORMAL
WBC # UR STRIP: ABNORMAL /HPF
WBC NRBC COR # BLD: 15.4 10*3/MM3 (ref 3.4–10.8)

## 2022-08-17 PROCEDURE — C1755 CATHETER, INTRASPINAL: HCPCS

## 2022-08-17 PROCEDURE — 87635 SARS-COV-2 COVID-19 AMP PRB: CPT | Performed by: OBSTETRICS & GYNECOLOGY

## 2022-08-17 PROCEDURE — 85025 COMPLETE CBC W/AUTO DIFF WBC: CPT | Performed by: OBSTETRICS & GYNECOLOGY

## 2022-08-17 PROCEDURE — 86870 RBC ANTIBODY IDENTIFICATION: CPT | Performed by: OBSTETRICS & GYNECOLOGY

## 2022-08-17 PROCEDURE — 87186 SC STD MICRODIL/AGAR DIL: CPT | Performed by: OBSTETRICS & GYNECOLOGY

## 2022-08-17 PROCEDURE — 0UQMXZZ REPAIR VULVA, EXTERNAL APPROACH: ICD-10-PCS | Performed by: OBSTETRICS & GYNECOLOGY

## 2022-08-17 PROCEDURE — 86901 BLOOD TYPING SEROLOGIC RH(D): CPT | Performed by: OBSTETRICS & GYNECOLOGY

## 2022-08-17 PROCEDURE — 25010000002 ONDANSETRON PER 1 MG: Performed by: OBSTETRICS & GYNECOLOGY

## 2022-08-17 PROCEDURE — 59025 FETAL NON-STRESS TEST: CPT

## 2022-08-17 PROCEDURE — 80306 DRUG TEST PRSMV INSTRMNT: CPT | Performed by: OBSTETRICS & GYNECOLOGY

## 2022-08-17 PROCEDURE — 25010000002 FENTANYL CITRATE (PF) 50 MCG/ML SOLUTION: Performed by: ANESTHESIOLOGY

## 2022-08-17 PROCEDURE — 25010000002 BUTORPHANOL PER 1 MG: Performed by: OBSTETRICS & GYNECOLOGY

## 2022-08-17 PROCEDURE — 4A1HX4Z MONITORING OF PRODUCTS OF CONCEPTION, CARDIAC ELECTRICAL ACTIVITY, EXTERNAL APPROACH: ICD-10-PCS | Performed by: OBSTETRICS & GYNECOLOGY

## 2022-08-17 PROCEDURE — 86850 RBC ANTIBODY SCREEN: CPT | Performed by: OBSTETRICS & GYNECOLOGY

## 2022-08-17 PROCEDURE — 81001 URINALYSIS AUTO W/SCOPE: CPT | Performed by: OBSTETRICS & GYNECOLOGY

## 2022-08-17 PROCEDURE — 25010000002 ROPIVACAINE PER 1 MG: Performed by: ANESTHESIOLOGY

## 2022-08-17 PROCEDURE — 87086 URINE CULTURE/COLONY COUNT: CPT | Performed by: OBSTETRICS & GYNECOLOGY

## 2022-08-17 PROCEDURE — C1755 CATHETER, INTRASPINAL: HCPCS | Performed by: ANESTHESIOLOGY

## 2022-08-17 PROCEDURE — 86900 BLOOD TYPING SEROLOGIC ABO: CPT | Performed by: OBSTETRICS & GYNECOLOGY

## 2022-08-17 PROCEDURE — 25010000002 AMPICILLIN PER 500 MG: Performed by: OBSTETRICS & GYNECOLOGY

## 2022-08-17 RX ORDER — OXYTOCIN/0.9 % SODIUM CHLORIDE 30/500 ML
2-20 PLASTIC BAG, INJECTION (ML) INTRAVENOUS
Status: DISCONTINUED | OUTPATIENT
Start: 2022-08-17 | End: 2022-08-17 | Stop reason: HOSPADM

## 2022-08-17 RX ORDER — SODIUM CHLORIDE 0.9 % (FLUSH) 0.9 %
1-10 SYRINGE (ML) INJECTION AS NEEDED
Status: DISCONTINUED | OUTPATIENT
Start: 2022-08-17 | End: 2022-08-19 | Stop reason: HOSPADM

## 2022-08-17 RX ORDER — SODIUM CHLORIDE, SODIUM LACTATE, POTASSIUM CHLORIDE, CALCIUM CHLORIDE 600; 310; 30; 20 MG/100ML; MG/100ML; MG/100ML; MG/100ML
125 INJECTION, SOLUTION INTRAVENOUS CONTINUOUS
Status: DISCONTINUED | OUTPATIENT
Start: 2022-08-17 | End: 2022-08-19 | Stop reason: HOSPADM

## 2022-08-17 RX ORDER — DOCUSATE SODIUM 100 MG/1
100 CAPSULE, LIQUID FILLED ORAL 2 TIMES DAILY
Status: DISCONTINUED | OUTPATIENT
Start: 2022-08-17 | End: 2022-08-19 | Stop reason: HOSPADM

## 2022-08-17 RX ORDER — OXYTOCIN/0.9 % SODIUM CHLORIDE 30/500 ML
250 PLASTIC BAG, INJECTION (ML) INTRAVENOUS CONTINUOUS
Status: DISPENSED | OUTPATIENT
Start: 2022-08-17 | End: 2022-08-17

## 2022-08-17 RX ORDER — METHYLERGONOVINE MALEATE 0.2 MG/ML
INJECTION INTRAVENOUS
Status: DISCONTINUED
Start: 2022-08-17 | End: 2022-08-19 | Stop reason: HOSPADM

## 2022-08-17 RX ORDER — OXYTOCIN/0.9 % SODIUM CHLORIDE 30/500 ML
125 PLASTIC BAG, INJECTION (ML) INTRAVENOUS CONTINUOUS PRN
Status: DISCONTINUED | OUTPATIENT
Start: 2022-08-17 | End: 2022-08-19 | Stop reason: HOSPADM

## 2022-08-17 RX ORDER — MISOPROSTOL 100 UG/1
TABLET ORAL
Status: COMPLETED
Start: 2022-08-17 | End: 2022-08-17

## 2022-08-17 RX ORDER — OXYTOCIN/0.9 % SODIUM CHLORIDE 30/500 ML
PLASTIC BAG, INJECTION (ML) INTRAVENOUS
Status: DISCONTINUED
Start: 2022-08-17 | End: 2022-08-19 | Stop reason: HOSPADM

## 2022-08-17 RX ORDER — TERBUTALINE SULFATE 1 MG/ML
0.25 INJECTION, SOLUTION SUBCUTANEOUS AS NEEDED
Status: DISCONTINUED | OUTPATIENT
Start: 2022-08-17 | End: 2022-08-17 | Stop reason: HOSPADM

## 2022-08-17 RX ORDER — ACETAMINOPHEN 325 MG/1
650 TABLET ORAL EVERY 4 HOURS PRN
Status: DISCONTINUED | OUTPATIENT
Start: 2022-08-17 | End: 2022-08-17 | Stop reason: HOSPADM

## 2022-08-17 RX ORDER — BISACODYL 10 MG
10 SUPPOSITORY, RECTAL RECTAL DAILY PRN
Status: DISCONTINUED | OUTPATIENT
Start: 2022-08-18 | End: 2022-08-19 | Stop reason: HOSPADM

## 2022-08-17 RX ORDER — IBUPROFEN 800 MG/1
800 TABLET ORAL EVERY 8 HOURS SCHEDULED
Status: DISCONTINUED | OUTPATIENT
Start: 2022-08-17 | End: 2022-08-17 | Stop reason: HOSPADM

## 2022-08-17 RX ORDER — EPHEDRINE SULFATE 5 MG/ML
10 INJECTION INTRAVENOUS
Status: DISCONTINUED | OUTPATIENT
Start: 2022-08-17 | End: 2022-08-17 | Stop reason: HOSPADM

## 2022-08-17 RX ORDER — ROPIVACAINE HYDROCHLORIDE 2 MG/ML
INJECTION, SOLUTION EPIDURAL; INFILTRATION; PERINEURAL
Status: COMPLETED
Start: 2022-08-17 | End: 2022-08-17

## 2022-08-17 RX ORDER — METOCLOPRAMIDE 10 MG/1
10 TABLET ORAL ONCE
Status: COMPLETED | OUTPATIENT
Start: 2022-08-17 | End: 2022-08-17

## 2022-08-17 RX ORDER — MISOPROSTOL 100 UG/1
600 TABLET ORAL AS NEEDED
Status: DISCONTINUED | OUTPATIENT
Start: 2022-08-17 | End: 2022-08-17 | Stop reason: HOSPADM

## 2022-08-17 RX ORDER — ROPIVACAINE HYDROCHLORIDE 2 MG/ML
INJECTION, SOLUTION EPIDURAL; INFILTRATION; PERINEURAL CONTINUOUS PRN
Status: DISCONTINUED | OUTPATIENT
Start: 2022-08-17 | End: 2022-08-17 | Stop reason: SURG

## 2022-08-17 RX ORDER — FENTANYL CITRATE 50 UG/ML
INJECTION, SOLUTION INTRAMUSCULAR; INTRAVENOUS
Status: COMPLETED
Start: 2022-08-17 | End: 2022-08-17

## 2022-08-17 RX ORDER — METHYLERGONOVINE MALEATE 0.2 MG/ML
200 INJECTION INTRAVENOUS ONCE AS NEEDED
Status: DISCONTINUED | OUTPATIENT
Start: 2022-08-17 | End: 2022-08-17 | Stop reason: HOSPADM

## 2022-08-17 RX ORDER — CARBOPROST TROMETHAMINE 250 UG/ML
250 INJECTION, SOLUTION INTRAMUSCULAR AS NEEDED
Status: DISCONTINUED | OUTPATIENT
Start: 2022-08-17 | End: 2022-08-17 | Stop reason: HOSPADM

## 2022-08-17 RX ORDER — LIDOCAINE HYDROCHLORIDE AND EPINEPHRINE 15; 5 MG/ML; UG/ML
INJECTION, SOLUTION EPIDURAL AS NEEDED
Status: DISCONTINUED | OUTPATIENT
Start: 2022-08-17 | End: 2022-08-17 | Stop reason: SURG

## 2022-08-17 RX ORDER — ONDANSETRON 2 MG/ML
4 INJECTION INTRAMUSCULAR; INTRAVENOUS EVERY 6 HOURS PRN
Status: DISCONTINUED | OUTPATIENT
Start: 2022-08-17 | End: 2022-08-17 | Stop reason: HOSPADM

## 2022-08-17 RX ORDER — BUPIVACAINE HYDROCHLORIDE 5 MG/ML
INJECTION, SOLUTION EPIDURAL; INTRACAUDAL
Status: COMPLETED
Start: 2022-08-17 | End: 2022-08-17

## 2022-08-17 RX ORDER — HYDROCODONE BITARTRATE AND ACETAMINOPHEN 5; 325 MG/1; MG/1
1 TABLET ORAL EVERY 6 HOURS PRN
Status: DISCONTINUED | OUTPATIENT
Start: 2022-08-17 | End: 2022-08-19 | Stop reason: HOSPADM

## 2022-08-17 RX ORDER — BUPIVACAINE HYDROCHLORIDE 5 MG/ML
INJECTION, SOLUTION EPIDURAL; INTRACAUDAL AS NEEDED
Status: DISCONTINUED | OUTPATIENT
Start: 2022-08-17 | End: 2022-08-17 | Stop reason: SURG

## 2022-08-17 RX ORDER — MAGNESIUM HYDROXIDE 1200 MG/15ML
1000 LIQUID ORAL ONCE AS NEEDED
Status: DISCONTINUED | OUTPATIENT
Start: 2022-08-17 | End: 2022-08-17 | Stop reason: HOSPADM

## 2022-08-17 RX ORDER — HYDROCORTISONE 25 MG/G
1 CREAM TOPICAL AS NEEDED
Status: DISCONTINUED | OUTPATIENT
Start: 2022-08-17 | End: 2022-08-19 | Stop reason: HOSPADM

## 2022-08-17 RX ORDER — FENTANYL CITRATE 50 UG/ML
INJECTION, SOLUTION INTRAMUSCULAR; INTRAVENOUS AS NEEDED
Status: DISCONTINUED | OUTPATIENT
Start: 2022-08-17 | End: 2022-08-17 | Stop reason: SURG

## 2022-08-17 RX ORDER — HYDROCODONE BITARTRATE AND ACETAMINOPHEN 5; 325 MG/1; MG/1
1 TABLET ORAL EVERY 4 HOURS PRN
Status: DISCONTINUED | OUTPATIENT
Start: 2022-08-17 | End: 2022-08-19 | Stop reason: HOSPADM

## 2022-08-17 RX ORDER — FENTANYL CITRATE 50 UG/ML
100 INJECTION, SOLUTION INTRAMUSCULAR; INTRAVENOUS ONCE
Status: DISCONTINUED | OUTPATIENT
Start: 2022-08-17 | End: 2022-08-17 | Stop reason: HOSPADM

## 2022-08-17 RX ORDER — SODIUM CHLORIDE 0.9 % (FLUSH) 0.9 %
3-10 SYRINGE (ML) INJECTION AS NEEDED
Status: DISCONTINUED | OUTPATIENT
Start: 2022-08-17 | End: 2022-08-17 | Stop reason: HOSPADM

## 2022-08-17 RX ORDER — BUTORPHANOL TARTRATE 1 MG/ML
1 INJECTION, SOLUTION INTRAMUSCULAR; INTRAVENOUS
Status: DISCONTINUED | OUTPATIENT
Start: 2022-08-17 | End: 2022-08-17 | Stop reason: HOSPADM

## 2022-08-17 RX ORDER — ONDANSETRON 4 MG/1
4 TABLET, FILM COATED ORAL EVERY 6 HOURS PRN
Status: DISCONTINUED | OUTPATIENT
Start: 2022-08-17 | End: 2022-08-17 | Stop reason: HOSPADM

## 2022-08-17 RX ORDER — DIPHENHYDRAMINE HCL 25 MG
25 CAPSULE ORAL NIGHTLY PRN
Status: DISCONTINUED | OUTPATIENT
Start: 2022-08-17 | End: 2022-08-19 | Stop reason: HOSPADM

## 2022-08-17 RX ORDER — PRENATAL VIT/IRON FUM/FOLIC AC 27MG-0.8MG
1 TABLET ORAL DAILY
Status: DISCONTINUED | OUTPATIENT
Start: 2022-08-18 | End: 2022-08-19 | Stop reason: HOSPADM

## 2022-08-17 RX ORDER — ONDANSETRON 2 MG/ML
4 INJECTION INTRAMUSCULAR; INTRAVENOUS ONCE AS NEEDED
Status: DISCONTINUED | OUTPATIENT
Start: 2022-08-17 | End: 2022-08-17 | Stop reason: HOSPADM

## 2022-08-17 RX ORDER — ROPIVACAINE HYDROCHLORIDE 2 MG/ML
14 INJECTION, SOLUTION EPIDURAL; INFILTRATION; PERINEURAL CONTINUOUS
Status: DISCONTINUED | OUTPATIENT
Start: 2022-08-17 | End: 2022-08-19 | Stop reason: HOSPADM

## 2022-08-17 RX ORDER — OXYTOCIN/0.9 % SODIUM CHLORIDE 30/500 ML
999 PLASTIC BAG, INJECTION (ML) INTRAVENOUS ONCE
Status: COMPLETED | OUTPATIENT
Start: 2022-08-17 | End: 2022-08-17

## 2022-08-17 RX ORDER — HYDROCODONE BITARTRATE AND ACETAMINOPHEN 7.5; 325 MG/1; MG/1
1 TABLET ORAL EVERY 4 HOURS PRN
Status: DISCONTINUED | OUTPATIENT
Start: 2022-08-17 | End: 2022-08-17 | Stop reason: HOSPADM

## 2022-08-17 RX ADMIN — WITCH HAZEL 1 PAD: 500 SOLUTION RECTAL; TOPICAL at 22:58

## 2022-08-17 RX ADMIN — BUTORPHANOL TARTRATE 2 MG: 2 INJECTION, SOLUTION INTRAMUSCULAR; INTRAVENOUS at 17:37

## 2022-08-17 RX ADMIN — FENTANYL CITRATE 100 MCG: 50 INJECTION, SOLUTION INTRAMUSCULAR; INTRAVENOUS at 18:24

## 2022-08-17 RX ADMIN — ONDANSETRON 4 MG: 2 INJECTION INTRAMUSCULAR; INTRAVENOUS at 17:41

## 2022-08-17 RX ADMIN — BUPIVACAINE HYDROCHLORIDE 5 ML: 5 INJECTION, SOLUTION EPIDURAL; INTRACAUDAL; PERINEURAL at 18:24

## 2022-08-17 RX ADMIN — AMPICILLIN SODIUM 2 G: 2 INJECTION, POWDER, FOR SOLUTION INTRAVENOUS at 19:04

## 2022-08-17 RX ADMIN — METOCLOPRAMIDE 10 MG: 10 TABLET ORAL at 21:03

## 2022-08-17 RX ADMIN — Medication 250 ML/HR: at 21:01

## 2022-08-17 RX ADMIN — DOCUSATE SODIUM 100 MG: 100 CAPSULE ORAL at 21:03

## 2022-08-17 RX ADMIN — IBUPROFEN 800 MG: 800 TABLET, FILM COATED ORAL at 21:03

## 2022-08-17 RX ADMIN — MISOPROSTOL 600 MCG: 100 TABLET ORAL at 19:50

## 2022-08-17 RX ADMIN — ROPIVACAINE HYDROCHLORIDE 14 ML/HR: 2 INJECTION, SOLUTION EPIDURAL; INFILTRATION at 18:24

## 2022-08-17 RX ADMIN — Medication 999 ML/HR: at 19:44

## 2022-08-17 RX ADMIN — HYDROCODONE BITARTRATE AND ACETAMINOPHEN 1 TABLET: 5; 325 TABLET ORAL at 22:58

## 2022-08-17 RX ADMIN — LIDOCAINE HYDROCHLORIDE AND EPINEPHRINE 5 ML: 15; 5 INJECTION, SOLUTION EPIDURAL at 18:24

## 2022-08-17 NOTE — H&P
Obstetric History and Physical    Chief Complaint   Patient presents with   • Laboring     ARRIVED BY AMBULANCE WITH DR WRIGHT. +FM  NO BLEEDING OR LOF       Subjective     Patient is a 21 y.o. female  currently at 37w6d, who presents with  contractions. She was brought in from the office via ambulance because of  labor. Pregnancy is complicated by history of  labor, s/p BMX x 2 at 33 weeks.     Her prenatal care is complicated by   labor . Her previous obstetric/gynecological history is noted for is remarkable for  previous  delivery at 36 weeks.     The following portions of the patients history were reviewed and updated as appropriate: current medications, allergies, past medical history, past surgical history, past family history, past social history and problem list .       Prenatal Information:   Maternal Prenatal Labs  Blood Type ABO Type   Date Value Ref Range Status   2022 O  Final      Rh Status RH type   Date Value Ref Range Status   2022 Negative  Final      Antibody Screen Antibody Screen   Date Value Ref Range Status   2022 Positive  Final      Rapid Urin Drug Screen No results found for: AMPMETHU, BARBITSCNUR, LABBENZSCN, LABMETHSCN, LABOPIASCN, THCURSCR, COCAINEUR, AMPHETSCREEN, PROPOXSCN, BUPRENORSCNU, METAMPSCNUR, OXYCODONESCN, TRICYCLICSCN   Group B Strep Culture No results found for: GBSANTIGEN           External Prenatal Results     Pregnancy Outside Results - Transcribed From Office Records - See Scanned Records For Details     Test Value Date Time    ABO  O  22    Rh  Negative  22 173    Antibody Screen  Positive  22 173       Positive  22       Negative  22      ^ Negative  22     Varicella IgG       Rubella ^ Immune  22     Hgb  11.7 g/dL 22       11.4 g/dL 22 0828       11.2 g/dL 22 1946    Hct  34.3 % 22 173       33.6 % 22        32.6 % 05/31/22 1946    Glucose Fasting GTT       Glucose Tolerance Test 1 hour ^ 76  05/05/22     Glucose Tolerance Test 3 hour       Gonorrhea (discrete) ^ Negative  02/28/22     Chlamydia (discrete) ^ Negative  02/28/22     RPR ^ Non-Reactive  02/28/22     VDRL       Syphilis Antibody       HBsAg ^ Negative  02/28/22     Herpes Simplex Virus PCR       Herpes Simplex VIrus Culture       HIV  Non-Reactive  07/16/22 2016      ^ Non-Reactive  02/28/22     Hep C RNA Quant PCR       Hep C Antibody       AFP       Group B Strep  Streptococcus agalactiae (Group B)  06/28/22 2244    GBS Susceptibility to Clindamycin       GBS Susceptibility to Erythromycin       Fetal Fibronectin       Genetic Testing, Maternal Blood             Drug Screening     Test Value Date Time    Urine Drug Screen       Amphetamine Screen  Negative  07/16/22 0521       Negative  06/28/22 2214       Negative  05/31/22 1940    Barbiturate Screen  Negative  07/16/22 0521       Negative  06/28/22 2214       Negative  05/31/22 1940    Benzodiazepine Screen  Negative  07/16/22 0521       Negative  06/28/22 2214       Negative  05/31/22 1940    Methadone Screen  Negative  07/16/22 0521       Negative  06/28/22 2214       Negative  05/31/22 1940    Phencyclidine Screen  Negative  07/16/22 0521       Negative  06/28/22 2214       Negative  05/31/22 1940    Opiates Screen  Negative  07/16/22 0521       Negative  06/28/22 2214       Negative  05/31/22 1940    THC Screen  Negative  07/16/22 0521       Negative  06/28/22 2214       Negative  05/31/22 1940    Cocaine Screen       Propoxyphene Screen  Negative  07/16/22 0521       Negative  06/28/22 2214       Negative  05/31/22 1940    Buprenorphine Screen  Negative  07/16/22 0521       Negative  06/28/22 2214       Negative  05/31/22 1940    Methamphetamine Screen       Oxycodone Screen  Negative  07/16/22 0521       Negative  06/28/22 2214       Negative  05/31/22 1940    Tricyclic Antidepressants Screen   Negative  22 0521       Negative  22 2214       Negative  22 1940          Legend    ^: Historical                          Past OB History:     OB History    Para Term  AB Living   3 2 1 1 0 2   SAB IAB Ectopic Molar Multiple Live Births   0 0 0 0 0 2      # Outcome Date GA Lbr Lee/2nd Weight Sex Delivery Anes PTL Lv   3 Current            2 Term 19 39w0d / 00:05 3096 g (6 lb 13.2 oz) M Vag-Spont EPI N CARRILLO      Name: HOA VENTURADONAVON      Apgar1: 9  Apgar5: 9   1  17 36w6d 11:32 / 00:13 2546 g (5 lb 9.8 oz) F Vag-Spont EPI N CARRILLO      Complications: Retained placenta      Name: HOA VENTURAKISHAN      Apgar1: 8  Apgar5: 9       Past Medical History: Past Medical History:   Diagnosis Date   • Anxiety    • Depression    • H/O seasonal allergies    • PPH (postpartum hemorrhage)    • Psoriasis    • Psoriasis    • Psoriasis    • Urinary tract infection       Past Surgical History Past Surgical History:   Procedure Laterality Date   • DILATATION AND CURETTAGE N/A 2017    Procedure: DILATATION AND CURETTAGE;  Surgeon: Juan Pablo Wood MD;  Location: [facility] OR;  Service:    • HEAD/NECK LESION/CYST EXCISION N/A 2020    Procedure: FACIAL LESION/CYST EXCISION;  Surgeon: Jay Cohen MD;  Location: [facility];  Service: General;  Laterality: N/A;      Family History: Family History   Problem Relation Age of Onset   • No Known Problems Mother    • Alcohol abuse Father    • Diabetes Father    • Asthma Maternal Grandmother    • Diabetes Paternal Grandmother    • Asthma Paternal Grandmother       Social History:  reports that she has quit smoking. Her smoking use included cigarettes. She has a 2.00 pack-year smoking history. She has never used smokeless tobacco.   reports no history of alcohol use.   reports no history of drug use.        Review of Systems   Gastrointestinal: Positive for abdominal pain.   Musculoskeletal: Positive for back pain.   All other systems  reviewed and are negative.        Objective     Vital Signs Range for the last 24 hours  Temperature:     Temp Source:     BP: BP: (128-139)/(71-89) 128/71   Pulse: Heart Rate:  [] 106   Respirations:     Weight:       Physical Examination:     Physical exam:    Constitutional:  Well-developed and well-nourished.  No respiratory distress.      HENT:  Head:  Normocephalic and atraumatic.  Mouth:  Moist mucous membranes.    Eyes:  Conjunctivae and EOM are normal.  No scleral icterus.    Neck:  Neck supple.  No JVD present.    Cardiovascular:  Normal rate, regular rhythm and normal heart sounds with no murmur. No edema.  Pulmonary/Chest:  No respiratory distress, no wheezes, no crackles, with normal breath sounds and good air movement.  Abdominal:  Soft.  Bowel sounds are normal.  No distension and no tenderness.   Musculoskeletal:  No edema, no tenderness, and no deformity.  No red or swollen joints anywhere.    Neurological:  Alert and oriented to person, place, and time. No facial droop.  No slurred speech.   Skin:  Skin is warm and dry. No rash noted. No pallor.   Pelvic Exam: normal external female genitalia.    Presentation: cephalic   Cervix: Exam by:  Dr Gilbert   Dilation:  8   Effacement: Cervical Effacement: 100%   Station:  -2     Laboratory Results:   CBC and coagulation:  Results from last 7 days   Lab Units 08/17/22  1736   WBC 10*3/mm3 15.40*   HEMOGLOBIN g/dL 11.7*   HEMATOCRIT % 34.3   MCV fL 86.8   MCHC g/dL 34.1   PLATELETS 10*3/mm3 180       Renal and electrolytes:      CrCl cannot be calculated (Patient's most recent lab result is older than the maximum 30 days allowed.).    Liver and pancreatic function:        Invalid input(s): PROT  Endocrine function:  No results found for: HGBA1C  Point of care bedside glucose levels:      Lab Results   Component Value Date    TSH 1.550 07/07/2020    FREET4 1.01 07/07/2020     Cardiac:        Cultures:  Lab Results   Component Value Date    COLORU Yellow  07/16/2022    CLARITYU Clear 07/16/2022    SPECGRAV 1.025 11/11/2020    PHUR 7.0 07/16/2022    GLUCOSEU Negative 07/16/2022    KETONESU Negative 07/16/2022    BLOODU Negative 07/16/2022    NITRITEU Negative 07/16/2022    LEUKOCYTESUR Trace (A) 07/16/2022    BILIRUBINUR Negative 07/16/2022    UROBILINOGEN 1.0 E.U./dL 07/16/2022    RBCUA None Seen 07/16/2022    WBCUA 6-12 (A) 07/16/2022    BACTERIA 1+ (A) 07/16/2022     Microbiology Results (last 10 days)     Procedure Component Value - Date/Time    COVID PRE-OP / PRE-PROCEDURE SCREENING ORDER (NO ISOLATION) - Swab, Nasopharynx [[id number]]  (Normal) Collected: 08/17/22 1735    Lab Status: Final result Specimen: Swab from Nasopharynx Updated: 08/17/22 1804    Narrative:      The following orders were created for panel order COVID PRE-OP / PRE-PROCEDURE SCREENING ORDER (NO ISOLATION) - Swab, Nasopharynx.  Procedure                               Abnormality         Status                     ---------                               -----------         ------                     COVID-19,CEPHEID/JOSE,CO...[[id number]]  Normal              Final result                 Please view results for these tests on the individual orders.    COVID-19,CEPHEID/JOSE,COR/BOBBY/PAD/ERNESTINA IN-HOUSE(OR EMERGENT/ADD-ON),NP SWAB IN TRANSPORT MEDIA 3-4 HR TAT, RT-PCR - Swab, Nasopharynx [[id number]]  (Normal) Collected: 08/17/22 1735    Lab Status: Final result Specimen: Swab from Nasopharynx Updated: 08/17/22 1804     COVID19 Not Detected    Narrative:      Fact sheet for providers: https://www.fda.gov/media/008061/download     Fact sheet for patients: https://www.fda.gov/media/679991/download  Fact sheet for providers: https://www.fda.gov/media/735206/download    Fact sheet for patients: https://www.fda.gov/media/030335/download    Test performed by PCR.          Lab Results   Component Value Date    PREGTESTUR Negative 12/12/2017     Pain Management Panel     Pain Management Panel Latest Ref Rng &  Units 2022    AMPHETAMINES SCREEN, URINE Negative Negative Negative    BARBITURATES SCREEN Negative Negative Negative    BENZODIAZEPINE SCREEN, URINE Negative Negative Negative    BUPRENORPHINEUR Negative Negative Negative    COCAINE SCREEN, URINE Negative Negative Negative    METHADONE SCREEN, URINE Negative Negative Negative    METHAMPHETAMINEUR Negative Negative Negative          Radiology Review:  No radiology results for the last 30 days.      Assessment & Plan        labor in third trimester    37 weeks gestation of pregnancy    Normal labor      Assessment:  1.  Intrauterine pregnancy at 37w6d weeks gestation with reactive fetal status.    2.   labor  without ROM  3.  Obstetrical history significant for is remarkable for .  4.  GBS status: positive on 2022. She received several doses of IV ampicillin a month ago when she was admitted for  labor.   Plan:  1. Vaginal anticipated, IV antibiotics  2. Plan of care has been reviewed with patient and family.   3. AROM clear.   3.  Risks, benefits of treatment plan have been discussed.  4.  All questions have been answered.    Terell Gilbert MD  2022  18:52 EDT

## 2022-08-17 NOTE — ANESTHESIA PREPROCEDURE EVALUATION
Anesthesia Evaluation     Patient summary reviewed and Nursing notes reviewed   no history of anesthetic complications:  NPO Solid Status: > 8 hours  NPO Liquid Status: > 8 hours           Airway   Mallampati: II  TM distance: >3 FB  Neck ROM: full  No difficulty expected  Dental - normal exam     Pulmonary - normal exam    breath sounds clear to auscultation  (+) a smoker Former,   Cardiovascular - negative cardio ROS and normal exam    Rhythm: regular  Rate: normal        Neuro/Psych  (+) psychiatric history,    GI/Hepatic/Renal/Endo - negative ROS     Musculoskeletal (-) negative ROS    Abdominal  - normal exam   Substance History - negative use     OB/GYN    (+) Pregnant (IUP 37 6/7 weeks gest),         Other - negative ROS                     Anesthesia Plan    ASA 2     epidural       Anesthetic plan, risks, benefits, and alternatives have been provided, discussed and informed consent has been obtained with: patient.  Pre-procedure education provided  Plan discussed with CRNA.        CODE STATUS:

## 2022-08-17 NOTE — NON STRESS TEST
Bianca Nieves, a  at 37w6d with an JAME of 2022, by Ultrasound, was seen at [facility] for a nonstress test.    Chief Complaint   Patient presents with   • Laboring     ARRIVED BY AMBULANCE WITH DR WRIGHT. +FM  NO BLEEDING OR LOF       Patient Active Problem List   Diagnosis   • Right upper quadrant abdominal pain   • Leukocytosis   • Abdominal pain affecting pregnancy   • Pregnancy   • Abdominal pain during pregnancy   • Visit for wound check   • UTI (urinary tract infection)   •  labor   • Threatened  labor, third trimester   • 33 weeks gestation of pregnancy       Start Time: 1740  Stop Time: 1800    Interpretation A  Nonstress Test Interpretation A: Reactive  Comments A: JORDON SCHWARTZ RN

## 2022-08-17 NOTE — ANESTHESIA PROCEDURE NOTES
Labor Epidural    Pre-sedation assessment completed: 8/17/2022 6:16 PM    Patient reassessed immediately prior to procedure    Patient location during procedure: OB  Start Time: 8/17/2022 6:17 PM  Stop Time: 8/17/2022 6:28 PM  Indication:at surgeon's request  Performed By  Anesthesiologist: Jorge Lou DO  Preanesthetic Checklist  Completed: patient identified, IV checked, site marked, risks and benefits discussed, surgical consent, monitors and equipment checked, pre-op evaluation and timeout performed  Prep:  Pt Position:sitting  Sterile Tech:gloves, mask, sterile barrier and cap  Prep:povidone-iodine 7.5% surgical scrub  Monitoring:blood pressure monitoring  Epidural Block Procedure:  Approach:midline  Guidance:landmark technique and palpation technique  Location:L3-L4  Needle Type:Tuohy  Needle Gauge:17 G  Loss of Resistance Medium: air  Loss of Resistance: 6cm  Cath Depth at skin:12 cm  Paresthesia: none  Aspiration:negative  Test Dose:negative  Med administered at 8/17/2022 6:20 PM  Number of Attempts: 1  Post Assessment:  Dressing:occlusive dressing applied and secured with tape  Pt Tolerance:patient tolerated the procedure well with no apparent complications  Complications:no

## 2022-08-18 LAB
ABO GROUP BLD: NORMAL
BASOPHILS # BLD AUTO: 0.04 10*3/MM3 (ref 0–0.2)
BASOPHILS NFR BLD AUTO: 0.3 % (ref 0–1.5)
DEPRECATED RDW RBC AUTO: 46 FL (ref 37–54)
EOSINOPHIL # BLD AUTO: 0.08 10*3/MM3 (ref 0–0.4)
EOSINOPHIL NFR BLD AUTO: 0.7 % (ref 0.3–6.2)
ERYTHROCYTE [DISTWIDTH] IN BLOOD BY AUTOMATED COUNT: 14.4 % (ref 12.3–15.4)
FETAL BLEED: NEGATIVE
HCT VFR BLD AUTO: 32.3 % (ref 34–46.6)
HGB BLD-MCNC: 10.7 G/DL (ref 12–15.9)
IMM GRANULOCYTES # BLD AUTO: 0.06 10*3/MM3 (ref 0–0.05)
IMM GRANULOCYTES NFR BLD AUTO: 0.5 % (ref 0–0.5)
LYMPHOCYTES # BLD AUTO: 1.68 10*3/MM3 (ref 0.7–3.1)
LYMPHOCYTES NFR BLD AUTO: 14.5 % (ref 19.6–45.3)
MCH RBC QN AUTO: 29.4 PG (ref 26.6–33)
MCHC RBC AUTO-ENTMCNC: 33.1 G/DL (ref 31.5–35.7)
MCV RBC AUTO: 88.7 FL (ref 79–97)
MONOCYTES # BLD AUTO: 0.55 10*3/MM3 (ref 0.1–0.9)
MONOCYTES NFR BLD AUTO: 4.8 % (ref 5–12)
NEUTROPHILS NFR BLD AUTO: 79.2 % (ref 42.7–76)
NEUTROPHILS NFR BLD AUTO: 9.16 10*3/MM3 (ref 1.7–7)
NRBC BLD AUTO-RTO: 0 /100 WBC (ref 0–0.2)
NUMBER OF DOSES: NORMAL
PLATELET # BLD AUTO: 152 10*3/MM3 (ref 140–450)
PMV BLD AUTO: 10.8 FL (ref 6–12)
RBC # BLD AUTO: 3.64 10*6/MM3 (ref 3.77–5.28)
RH BLD: NEGATIVE
WBC NRBC COR # BLD: 11.57 10*3/MM3 (ref 3.4–10.8)

## 2022-08-18 PROCEDURE — 86901 BLOOD TYPING SEROLOGIC RH(D): CPT | Performed by: OBSTETRICS & GYNECOLOGY

## 2022-08-18 PROCEDURE — 86900 BLOOD TYPING SEROLOGIC ABO: CPT | Performed by: OBSTETRICS & GYNECOLOGY

## 2022-08-18 PROCEDURE — 25010000002 AMPICILLIN PER 500 MG: Performed by: OBSTETRICS & GYNECOLOGY

## 2022-08-18 PROCEDURE — 85025 COMPLETE CBC W/AUTO DIFF WBC: CPT | Performed by: OBSTETRICS & GYNECOLOGY

## 2022-08-18 PROCEDURE — 85461 HEMOGLOBIN FETAL: CPT | Performed by: OBSTETRICS & GYNECOLOGY

## 2022-08-18 PROCEDURE — 25010000002 RHO D IMMUNE GLOBULIN 1500 UNIT/2ML SOLUTION PREFILLED SYRINGE: Performed by: OBSTETRICS & GYNECOLOGY

## 2022-08-18 RX ORDER — PRENATAL VIT/IRON FUM/FOLIC AC 27MG-0.8MG
1 TABLET ORAL DAILY
Status: CANCELLED | OUTPATIENT
Start: 2022-08-18

## 2022-08-18 RX ORDER — IBUPROFEN 800 MG/1
800 TABLET ORAL EVERY 8 HOURS SCHEDULED
Status: DISCONTINUED | OUTPATIENT
Start: 2022-08-18 | End: 2022-08-19 | Stop reason: HOSPADM

## 2022-08-18 RX ORDER — ACETAMINOPHEN 325 MG/1
325 TABLET ORAL EVERY 6 HOURS PRN
Status: CANCELLED | OUTPATIENT
Start: 2022-08-18

## 2022-08-18 RX ORDER — ACETAMINOPHEN 325 MG/1
325 TABLET ORAL EVERY 6 HOURS PRN
COMMUNITY

## 2022-08-18 RX ADMIN — Medication 1 APPLICATION: at 05:00

## 2022-08-18 RX ADMIN — IBUPROFEN 800 MG: 800 TABLET, FILM COATED ORAL at 14:00

## 2022-08-18 RX ADMIN — AMPICILLIN SODIUM 1 G: 1 INJECTION, POWDER, FOR SOLUTION INTRAMUSCULAR; INTRAVENOUS at 04:50

## 2022-08-18 RX ADMIN — IBUPROFEN 800 MG: 800 TABLET, FILM COATED ORAL at 08:27

## 2022-08-18 RX ADMIN — IBUPROFEN 800 MG: 800 TABLET, FILM COATED ORAL at 21:54

## 2022-08-18 RX ADMIN — PRENATAL VIT W/ FE FUMARATE-FA TAB 27-0.8 MG 1 TABLET: 27-0.8 TAB at 08:27

## 2022-08-18 RX ADMIN — HYDROCODONE BITARTRATE AND ACETAMINOPHEN 1 TABLET: 5; 325 TABLET ORAL at 21:54

## 2022-08-18 RX ADMIN — DOCUSATE SODIUM 100 MG: 100 CAPSULE ORAL at 21:00

## 2022-08-18 RX ADMIN — AMPICILLIN SODIUM 1 G: 1 INJECTION, POWDER, FOR SOLUTION INTRAMUSCULAR; INTRAVENOUS at 01:35

## 2022-08-18 RX ADMIN — HUMAN RHO(D) IMMUNE GLOBULIN 1500 UNITS: 1500 SOLUTION INTRAMUSCULAR; INTRAVENOUS at 11:24

## 2022-08-18 RX ADMIN — DOCUSATE SODIUM 100 MG: 100 CAPSULE ORAL at 08:28

## 2022-08-18 NOTE — L&D DELIVERY NOTE
[facility]  Vaginal Delivery Note    Delivery     Delivery: Vaginal, Spontaneous     YOB: 2024    Time of Birth: 7:40 PM      Anesthesia: Epidural     Delivering clinician: Terell Gilbert    Forceps?   No   Vacuum? No    Shoulder dystocia present: No        Delivery narrative:      Infant    Findings: male  infant     Infant observations: Weight: No birth weight on file.   Length:   in  Observations/Comments:        Apgars: 8  @ 1 minute /    9  @ 5 minutes   Infant Name:      Placenta, Cord, and Fluid    Placenta delivered  Spontaneous  at  8/17/2022  7:43 PM     Cord: 3 vessels  present.   Nuchal Cord?  yes; Number of nuchal loops present:  1    Cord blood obtained: Yes                   Repair    Episiotomy: None    Lacerations: Yes  Laceration Information  Laceration Repaired?   Perineal: None      Periurethral:       Labial: right  Yes    Sulcus:       Vaginal:       Cervical:         Suture used for repair: 3-0 Vicryl   Estimated Blood Loss: 150  mls.   Suture used for repair:      Complications  none    Disposition  Mother to postpartum in stable condition.    Terell Gilbert MD  08/17/22  20:05 EDT

## 2022-08-18 NOTE — NURSING NOTE
pt called out to nurses station, states IV is burning. upon assessment, IV site is swollen, tender to touch. elevated extremity, attempted to aspirate IV, removed IV. photographed area and placed in medical record. warm compress applied. orders placed per protocol. new IV established. Notified Dr. Gilbert.

## 2022-08-18 NOTE — PLAN OF CARE
Goal Outcome Evaluation:  Plan of Care Reviewed With: patient        Progress: improving  Outcome Evaluation: VOIDING WITHOUT DIFFICULTY/ SMALL RUBRA/ BONDING WELL WITH INFANT

## 2022-08-18 NOTE — PROGRESS NOTES
" [facility]  Vaginal Delivery Progress Note    Subjective   Subjective  Postpartum Day 1: Vaginal Delivery    The patient feels well.  Her pain is well controlled with nonsteroidal anti-inflammatory drugs.   She is ambulating well.  Patient describes her bleeding as thin lochia.        Objective     Objective   Vital Signs Range for the last 24 hours  Temperature: Temp:  [97.5 °F (36.4 °C)-99.5 °F (37.5 °C)] 98.4 °F (36.9 °C)   Temp Source: Temp src: Oral   BP: BP: (111-139)/(53-89) 120/79   Pulse: Heart Rate:  [] 69   Respirations: Resp:  [16-18] 18   Weight: Weight:  [81.6 kg (180 lb)] 81.6 kg (180 lb)     Admit Height:  Height: 165.1 cm (65\")    Physical Exam:  General:  no acute distresss.  Abdomen: Fundus: appropriate, firm, non tender  Extremities: normal, atraumatic, no cyanosis, and trace edema.     [unfilled]       Lab Results   Component Value Date    ABO O 2022    RH Negative 2022        Lab Results   Component Value Date    HGB 10.7 (L) 2022    HCT 32.3 (L) 2022         Assessment & Plan   Principal Problem:     labor in third trimester  Active Problems:    Normal labor    37 weeks gestation of pregnancy      Bianca MUSTAPHA Gutierrezey is Day 1  post-partum  Vaginal, Spontaneous   .      Plan:  Continue current care.      Zoe Arriaga DO  2022  09:15 EDT    "

## 2022-08-19 ENCOUNTER — PATIENT OUTREACH (OUTPATIENT)
Dept: LABOR AND DELIVERY | Facility: HOSPITAL | Age: 22
End: 2022-08-19

## 2022-08-19 VITALS
RESPIRATION RATE: 18 BRPM | WEIGHT: 180 LBS | DIASTOLIC BLOOD PRESSURE: 59 MMHG | OXYGEN SATURATION: 98 % | BODY MASS INDEX: 29.99 KG/M2 | TEMPERATURE: 97.7 F | HEART RATE: 60 BPM | SYSTOLIC BLOOD PRESSURE: 111 MMHG | HEIGHT: 65 IN

## 2022-08-19 RX ORDER — IBUPROFEN 600 MG/1
600 TABLET ORAL EVERY 6 HOURS
Qty: 30 TABLET | Refills: 0 | Status: SHIPPED | OUTPATIENT
Start: 2022-08-19

## 2022-08-19 RX ORDER — DOCUSATE SODIUM 100 MG/1
100 CAPSULE, LIQUID FILLED ORAL 2 TIMES DAILY PRN
Qty: 60 CAPSULE | Refills: 1 | Status: SHIPPED | OUTPATIENT
Start: 2022-08-19

## 2022-08-19 RX ADMIN — PRENATAL VIT W/ FE FUMARATE-FA TAB 27-0.8 MG 1 TABLET: 27-0.8 TAB at 08:20

## 2022-08-19 RX ADMIN — IBUPROFEN 800 MG: 800 TABLET, FILM COATED ORAL at 13:04

## 2022-08-19 RX ADMIN — IBUPROFEN 800 MG: 800 TABLET, FILM COATED ORAL at 06:03

## 2022-08-19 RX ADMIN — DOCUSATE SODIUM 100 MG: 100 CAPSULE ORAL at 08:20

## 2022-08-19 RX ADMIN — MAGNESIUM HYDROXIDE 10 ML: 2400 SUSPENSION ORAL at 08:22

## 2022-08-19 NOTE — OUTREACH NOTE
[facility]  IP Postpartum    Questions/Answers    Flowsheet Row Responses   Best Method for Contacting Home   Support Person Present Yes   Does the patient have a car seat at the hospital Yes   Delivery Note Reviewed Reviewed   Were birth expectations met? Yes   Is there a need for additional support/resources? No   Lactation Note Reviewed Other   Note Reviewed Other Comment NA   Is additional support needed? No   Any questions or concerns? No   Is the patient going to use Meds to Beds? Yes   Any concerns related discharge meds/ability to  prescriptions? No   OB Discharge Navigator Reviewed  Reviewed   Confirm Postpartum OB appointment Yes   Postpartum OB appointment date 22   Confirm initial well-child Pediatrician appointment date/time: Yes   Initial Well Child Pediatrician Appointment Date 22   Additional post-discharge F/U appointments Yes   Post-Discharge F/U appointments details Infant has audiology appt on 10/05/2022 for hearing screen.   Does patient have transportation to appointments? Yes   Any other assistance needed to ensure she is able to attend appointments? No   Does patient have supplies needed at home for  care? Clothing, Crib, Diapers, Formula          Spoke with patient at bedside. Pt denies any needs or concerns at this time.    Eli Bernal RN  Maternity Nurse Navigator    2022, 13:34 EDT

## 2022-08-19 NOTE — DISCHARGE INSTR - APPOINTMENTS
Please return to [facility] for a follow up appointment on Friday Sept.9 at 9:20 with Dr. Gilbert.

## 2022-08-19 NOTE — PLAN OF CARE
Problem: Adult Inpatient Plan of Care  Goal: Plan of Care Review  8/19/2022 1024 by Aracelis Andujar RN  Outcome: Adequate for Care Transition  8/19/2022 1021 by Aracelis Andujar RN  Outcome: Ongoing, Progressing  Flowsheets (Taken 8/18/2022 1555 by Daisy Gates RN)  Plan of Care Reviewed With: patient  Outcome Evaluation: VOIDING WITHOUT DIFFICULTY/ SMALL RUBRA/ BONDING WELL WITH INFANT  Goal: Patient-Specific Goal (Individualized)  8/19/2022 1024 by Aracelis Andujar RN  Outcome: Adequate for Care Transition  8/19/2022 1021 by Aracelis Andujar RN  Outcome: Ongoing, Progressing  Goal: Absence of Hospital-Acquired Illness or Injury  8/19/2022 1024 by Aracelis Andujar RN  Outcome: Adequate for Care Transition  8/19/2022 1021 by Aracelis Andujar RN  Outcome: Ongoing, Progressing  Intervention: Identify and Manage Fall Risk  Recent Flowsheet Documentation  Taken 8/19/2022 0820 by Aracelis Andujar RN  Safety Promotion/Fall Prevention: safety round/check completed  Taken 8/19/2022 0730 by Aracelis Andujar RN  Safety Promotion/Fall Prevention: safety round/check completed  Intervention: Prevent and Manage VTE (Venous Thromboembolism) Risk  Recent Flowsheet Documentation  Taken 8/19/2022 0820 by Aracelis Andujar RN  Activity Management: up ad sury  Goal: Optimal Comfort and Wellbeing  8/19/2022 1024 by Aracelis Andujra RN  Outcome: Adequate for Care Transition  8/19/2022 1021 by Aracelis Andujar RN  Outcome: Ongoing, Progressing  Intervention: Monitor Pain and Promote Comfort  Recent Flowsheet Documentation  Taken 8/19/2022 0820 by Aracelis Andujar RN  Pain Management Interventions: pain management plan reviewed with patient/caregiver  Intervention: Provide Person-Centered Care  Recent Flowsheet Documentation  Taken 8/19/2022 0820 by Aracelis Andujar RN  Trust Relationship/Rapport:   care explained   choices provided   emotional support provided   empathic listening provided   questions  answered   questions encouraged   reassurance provided   thoughts/feelings acknowledged  Goal: Readiness for Transition of Care  8/19/2022 1024 by Aracelis Andujar, RN  Outcome: Adequate for Care Transition  8/19/2022 1021 by Aracelis Andujar, RN  Outcome: Ongoing, Progressing   Goal Outcome Evaluation:

## 2022-08-19 NOTE — DISCHARGE SUMMARY
JOSE Abarca  Delivery Discharge Summary    Primary OB Clinician:     EDC: Estimated Date of Delivery: 22    Gestational Age:37w6d    Antepartum complications:  labor    Date of Delivery: 2022   Time of Delivery: 7:40 PM     Delivered By:  Terell Gilbert     Delivery Type: Vaginal, Spontaneous      Tubal Ligation: n/a    Baby:   male    Apgar:  8  @ 1 minute /   Apgar:  9  @ 5 minutes   Weight: 3388 g (7 lb 7.5 oz)     Anesthesia: Epidural      Intrapartum complications: None    Rh Immune globulin given: yes    Subjective     Subjective   Postpartum day Day 2 S/P   Subjective  Patient reports:  Pain is controlled .  She is up out of bed this am. Tolerating diet. Tolerating po -- normal. Voiding - without difficulty; flatus reported.  Vaginal bleeding is as much as expected. Denies chest pain/shortness of breath. Has history of anxiety/depression for which she has taken medications in the past, but not recently. Mood currently stable. Declines medications    Breastfeeding: declines.  Contraception: mirena IUD at 6w visit    Objective    Objective  Vitals: Vital Signs Range for the last 24 hours  Temperature: Temp:  [97.7 °F (36.5 °C)-98.2 °F (36.8 °C)] 97.7 °F (36.5 °C)   Temp Source: Temp src: Oral   BP: BP: (111-112)/(59-63) 111/59   Pulse: Heart Rate:  [60-79] 60   Respirations: Resp:  [18-20] 18   Weight:          Physical Exam    Lungs clear to auscultation bilaterally   Abdomen Soft, ND, fundus firm to palpation below umbilicus    Respiratory Clear to ausculation bilaterally   CV Regular rate and rhythm    Extremities extremities normal, atraumatic, no cyanosis + edema equal bilaterally no erythema or warmth.       [unfilled]       Lab Results   Component Value Date    ABO O 2022    RH Negative 2022        Lab Results   Component Value Date    HGB 10.7 (L) 2022    HCT 32.3 (L) 2022       Assesment and Plan:        labor in third trimester    Normal labor    37 weeks  gestation of pregnancy    Assessment & Plan    Assessment:    Bianca Nieves is Day 2  post-partum  Vaginal, Spontaneous   .      Plan:    Continue post op care and plan for discharge today.     Follow-up appointment with [facility] in 3 weeks.    Infection/bleeding precautions reviewed.     Discharge Date: 8/19/2022; Discharge Time: 09:39 EDT        Clara Ace MD  8/19/2022  09:39 EDT

## 2022-08-19 NOTE — PLAN OF CARE
Problem: Adult Inpatient Plan of Care  Goal: Plan of Care Review  Outcome: Ongoing, Progressing  Flowsheets (Taken 8/18/2022 1555 by Daisy Gates RN)  Plan of Care Reviewed With: patient  Outcome Evaluation: VOIDING WITHOUT DIFFICULTY/ SMALL RUBRA/ BONDING WELL WITH INFANT  Goal: Patient-Specific Goal (Individualized)  Outcome: Ongoing, Progressing  Goal: Absence of Hospital-Acquired Illness or Injury  Outcome: Ongoing, Progressing  Intervention: Identify and Manage Fall Risk  Recent Flowsheet Documentation  Taken 8/19/2022 0820 by Aracelis Andujar RN  Safety Promotion/Fall Prevention: safety round/check completed  Taken 8/19/2022 0730 by Aracelis Andujar RN  Safety Promotion/Fall Prevention: safety round/check completed  Intervention: Prevent and Manage VTE (Venous Thromboembolism) Risk  Recent Flowsheet Documentation  Taken 8/19/2022 0820 by Aracelis Andujar RN  Activity Management: up ad sury  Goal: Optimal Comfort and Wellbeing  Outcome: Ongoing, Progressing  Intervention: Monitor Pain and Promote Comfort  Recent Flowsheet Documentation  Taken 8/19/2022 0820 by Aracelis Andujar RN  Pain Management Interventions: pain management plan reviewed with patient/caregiver  Intervention: Provide Person-Centered Care  Recent Flowsheet Documentation  Taken 8/19/2022 0820 by Aracelis Andujar RN  Trust Relationship/Rapport:   care explained   choices provided   emotional support provided   empathic listening provided   questions answered   questions encouraged   reassurance provided   thoughts/feelings acknowledged  Goal: Readiness for Transition of Care  Outcome: Ongoing, Progressing   Goal Outcome Evaluation:

## 2022-08-20 LAB
BACTERIA SPEC AEROBE CULT: ABNORMAL
BACTERIA SPEC AEROBE CULT: ABNORMAL

## 2022-08-26 ENCOUNTER — PATIENT OUTREACH (OUTPATIENT)
Dept: LABOR AND DELIVERY | Facility: HOSPITAL | Age: 22
End: 2022-08-26

## 2022-08-26 NOTE — OUTREACH NOTE
[facility]  Unable to Reach       Questions/Answers    Flowsheet Row Responses   Pending Outreach Postpartum Check-in   Call Attempt First   Outcome No answer/busy   Unable to reach comments: No Voice mail available.        No voice mail available.    Eli Bernal RN  Maternity Nurse Navigator    8/26/2022, 12:58 EDT

## 2022-08-29 ENCOUNTER — PATIENT OUTREACH (OUTPATIENT)
Dept: LABOR AND DELIVERY | Facility: HOSPITAL | Age: 22
End: 2022-08-29

## 2022-08-29 NOTE — OUTREACH NOTE
[facility]  Unable to Reach       Questions/Answers    Flowsheet Row Responses   Pending Outreach Postpartum Check-in   Call Attempt Second   Outcome No answer/busy   Unable to reach comments: No voice mail available.          Unable to reach. No voice mail available.    Eli Bernal RN  Maternity Nurse Navigator    8/29/2022, 10:26 EDT

## 2022-11-02 ENCOUNTER — TELEMEDICINE (OUTPATIENT)
Dept: PSYCHIATRY | Facility: CLINIC | Age: 22
End: 2022-11-02

## 2022-11-02 DIAGNOSIS — F31.81 BIPOLAR II DISORDER: Primary | ICD-10-CM

## 2022-11-02 DIAGNOSIS — F41.1 GENERALIZED ANXIETY DISORDER: ICD-10-CM

## 2022-11-02 DIAGNOSIS — F33.2 SEVERE EPISODE OF RECURRENT MAJOR DEPRESSIVE DISORDER, WITHOUT PSYCHOTIC FEATURES: ICD-10-CM

## 2022-11-02 PROCEDURE — 90792 PSYCH DIAG EVAL W/MED SRVCS: CPT

## 2022-11-02 RX ORDER — LAMOTRIGINE 100 MG/1
100 TABLET ORAL DAILY
Qty: 30 TABLET | Refills: 0 | Status: SHIPPED | OUTPATIENT
Start: 2022-11-02 | End: 2022-12-01 | Stop reason: SDUPTHER
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
## 2022-12-01 ENCOUNTER — TELEMEDICINE (OUTPATIENT)
Dept: PSYCHIATRY | Facility: CLINIC | Age: 22
End: 2022-12-01

## 2022-12-01 DIAGNOSIS — F31.81 BIPOLAR II DISORDER: Primary | ICD-10-CM

## 2022-12-01 DIAGNOSIS — Z79.899 MEDICATION MANAGEMENT: ICD-10-CM

## 2022-12-01 DIAGNOSIS — G47.9 SLEEP DISTURBANCE: ICD-10-CM

## 2022-12-01 DIAGNOSIS — F32.1 CURRENT MODERATE EPISODE OF MAJOR DEPRESSIVE DISORDER, UNSPECIFIED WHETHER RECURRENT: ICD-10-CM

## 2022-12-01 DIAGNOSIS — F41.1 GENERALIZED ANXIETY DISORDER: ICD-10-CM

## 2022-12-01 PROCEDURE — 99214 OFFICE O/P EST MOD 30 MIN: CPT

## 2022-12-01 RX ORDER — ESCITALOPRAM OXALATE 10 MG/1
10 TABLET ORAL DAILY
Qty: 30 TABLET | Refills: 0 | Status: SHIPPED | OUTPATIENT
Start: 2022-12-01 | End: 2022-12-29 | Stop reason: ALTCHOICE

## 2022-12-01 RX ORDER — TRAZODONE HYDROCHLORIDE 50 MG/1
50 TABLET ORAL NIGHTLY
Qty: 30 TABLET | Refills: 0 | Status: SHIPPED | OUTPATIENT
Start: 2022-12-01 | End: 2022-12-29

## 2022-12-01 RX ORDER — LAMOTRIGINE 100 MG/1
100 TABLET ORAL DAILY
Qty: 30 TABLET | Refills: 0 | Status: SHIPPED | OUTPATIENT
Start: 2022-12-01 | End: 2022-12-29 | Stop reason: SDUPTHER

## 2022-12-20 NOTE — PROGRESS NOTES
"This provider is located at the [facility] (through [facility]), [address] [place], [address] using a secure video visit through [employer]. The patient's condition being consulted/diagnosed/treated is appropriate for telemedicine. The provider identified herself as well as her credentials.   The patient, and/or patients guardian, consent to be seen remotely, and when consent is given they understand that the consent allows for patient identifiable information to be sent to a third party as needed. They may refuse to be seen remotely at any time. The electronic data is encrypted and password protected, and the patient and/or guardian has been advised of the potential risks to privacy not withstanding such measures.   The use of a video visit has been reviewed with the patient and verbal informed consent has been obtained.   Patient identifiers used: Name and .    Subjective   Bianca Nieves is a 22 y.o. female who presents today for follow up    Chief Complaint:    Chief Complaint   Patient presents with   • Anxiety   • Depression        History of Present Illness:    History of Present Illness  Patient is a 22-year-old female presenting for a 1 month follow-up for depression and anxiety.  Her main concern today is anxiety and \"trouble falling asleep.\"  She states \"I feel like I am not as depressed anymore.\"  PHQ screening performed at this time and again reduced from 16-10, (originally scored at 24).  KHOI screening performed at this time and unchanged with a continued score of 16.  She denies SI, HI, SIB, and also denies hallucinations, (previously her children running in the hallway).  Unfortunately patient states she has struggled with psoriasis most of her life and is having a flareup.  Left elbow is excoriated.  She is unsure if this is due to her environment or to Lamictal.  She states she does not feel Lexapro has been effective at all in reducing her " anxiety.    She continues to work full-time and raise her children.  Denies alcohol or drug use. 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       Last Menstrual Period: last week      The following portions of the patient's history were reviewed and updated as appropriate: allergies, current medications, past family history, past medical history, past social history, past surgical history and problem list.    Past Psychiatric History:  Began Treatment:currently  Diagnoses:Bipolar II  Psychiatrist:Denies  Therapist:Denies  Admission History:Denies  Medication Trials:paxil, hydroxyzine, zoloft  Self Harm: last week  Suicide Attempts:Denies   Psychosis, Anxiety, Depression: depression and anxiety, denies psychosis    Past Medical History:  Past Medical History:   Diagnosis Date   • Anxiety    • Depression    • H/O seasonal allergies    • PPH (postpartum hemorrhage)    • Psoriasis    • Psoriasis    • Psoriasis    • Urinary tract infection        Substance Abuse History:   Types:Denies all, including illicit  Withdrawal Symptoms:Denies  Longest Period Sober:Not Applicable   AA: Not applicable     Social History:  Social History     Socioeconomic History   • Marital status: Significant Other     Spouse name: Bill David   • Number of children: 3   • Highest education level: High school graduate   Tobacco Use   • Smoking status: Every Day     Packs/day: 0.50     Years: 4.00     Pack years: 2.00     Types: Cigarettes   • Smokeless tobacco: Never   • Tobacco comments:     PT STATES IS AROUND SMOKING EVERY DAY   Vaping Use   • Vaping Use: Never used   Substance and Sexual Activity   • Alcohol use: No   • Drug use: No   • Sexual activity: Yes       Family History:  Family History   Problem Relation Age of Onset   • No Known Problems Mother    • Alcohol abuse Father    • Diabetes Father    • Asthma Maternal Grandmother    • Diabetes Paternal  Grandmother    • Asthma Paternal Grandmother        Past Surgical History:  Past Surgical History:   Procedure Laterality Date   • DILATATION AND CURETTAGE N/A 2017    Procedure: DILATATION AND CURETTAGE;  Surgeon: Juan Pablo Wood MD;  Location:  [facility] OR;  Service:    • HEAD/NECK LESION/CYST EXCISION N/A 2020    Procedure: FACIAL LESION/CYST EXCISION;  Surgeon: Jay Cohen MD;  Location: [facility] OR;  Service: General;  Laterality: N/A;       Problem List:  Patient Active Problem List   Diagnosis   • Right upper quadrant abdominal pain   • Leukocytosis   • Abdominal pain affecting pregnancy   • Pregnancy   • Abdominal pain during pregnancy   • Visit for wound check   • UTI (urinary tract infection)   •  labor   • Threatened  labor, third trimester   • 33 weeks gestation of pregnancy   • Normal labor   •  labor in third trimester   • 37 weeks gestation of pregnancy   • Generalized anxiety disorder       Allergy:   No Known Allergies     Current Medications:   Current Outpatient Medications   Medication Sig Dispense Refill   • traZODone (DESYREL) 150 MG tablet Take 1 tablet by mouth Every Night for 30 days. 30 tablet 0   • acetaminophen (TYLENOL) 325 MG tablet Take 325 mg by mouth Every 6 (Six) Hours As Needed for Mild Pain .     • docusate sodium (Colace) 100 MG capsule Take 1 capsule by mouth 2 (Two) Times a Day As Needed for Constipation. 60 capsule 1   • ibuprofen (ADVIL,MOTRIN) 600 MG tablet Take 1 tablet by mouth Every 6 (Six) Hours. 30 tablet 0   • Prenatal MV & Min w/FA-DHA (Prenatal Adult Gummy/DHA/FA) 0.4-25 MG chewable tablet Chew 1 each Daily.     • sertraline (Zoloft) 25 MG tablet Take 1 tablet by mouth Daily. 30 tablet 0     No current facility-administered medications for this visit.       Review of Systems:    Review of Systems   Constitutional: Positive for appetite change.   Skin: Positive for rash.        Left elbow   Neurological: Negative for seizures.    Psychiatric/Behavioral: Positive for sleep disturbance, depressed mood and stress. The patient is nervous/anxious.    All other systems reviewed and are negative.        Physical Exam:   Physical Exam  Constitutional:       Appearance: Normal appearance.   HENT:      Head: Normocephalic.      Nose: Nose normal.   Pulmonary:      Effort: Pulmonary effort is normal.   Musculoskeletal:         General: Normal range of motion.      Cervical back: Normal range of motion.   Skin:     Findings: Bruising present.      Comments: Bruise to lower left arm from self-inflicted injury. No longer open. Educated upon signs of infection.    Neurological:      Mental Status: She is alert.   Psychiatric:         Attention and Perception: Attention normal.         Mood and Affect: Mood is anxious and depressed. Affect is flat.         Speech: Speech normal.         Behavior: Behavior is cooperative.         Thought Content: Thought content normal.         Cognition and Memory: Cognition normal.         Judgment: Judgment normal.         Vitals:  not currently breastfeeding. There is no height or weight on file to calculate BMI.  Due to extenuating circumstances and possible current health risks associated with the patient being present in a clinical setting (with current health restrictions in place in regards to possible COVID 19 transmission/exposure), the patient was seen remotely today via a My[employer] Video Visit through [employer].  Unable to obtain vital signs due to nature of remote visit.  Height stated at 65 inches.  Weight stated at 185 pounds.    Last 3 Blood Pressure Readings:  BP Readings from Last 3 Encounters:   08/19/22 111/59   07/18/22 98/53   06/30/22 117/56       PHQ-9 Score:   PHQ-9 Total Score:       KHOI-7 Score:   Feeling nervous, anxious or on edge: Nearly every day  Not being able to stop or control worrying: More than half the days  Worrying too much about different things: More than half the days  Trouble Relaxing:  Nearly every day  Being so restless that it is hard to sit still: More than half the days  Feeling afraid as if something awful might happen: Several days  Becoming easily annoyed or irritable: Nearly every day  KHOI 7 Total Score: 16  If you checked any problems, how difficult have these problems made it for you to do your work, take care of things at home, or get along with other people: Very difficult               Mental Status Exam:   Hygiene:   fair  Cooperation:  Cooperative  Eye Contact:  Good  Psychomotor Behavior:  Appropriate  Affect:  Blunted  Mood: anxious  Hopelessness: Denies  Speech:  Normal  Thought Process:  Goal directed and Linear  Thought Content:  Mood congruent  Suicidal:  None and some previously, not x 1 month  Homicidal:  None  Hallucinations:  None  Delusion:  None  Memory:  Intact  Orientation:  Grossly intact  Reliability:  good  Insight:  Fair  Judgement:  Fair  Impulse Control:  Fair  Physical/Medical Issues:  No        Lab Results:   Admission on 08/17/2022, Discharged on 08/19/2022   Component Date Value Ref Range Status   • THC, Screen, Urine 08/17/2022 Negative  Negative Final   • Phencyclidine (PCP), Urine 08/17/2022 Negative  Negative Final   • Cocaine Screen, Urine 08/17/2022 Negative  Negative Final   • Methamphetamine, Ur 08/17/2022 Negative  Negative Final   • Opiate Screen 08/17/2022 Negative  Negative Final   • Amphetamine Screen, Urine 08/17/2022 Negative  Negative Final   • Benzodiazepine Screen, Urine 08/17/2022 Negative  Negative Final   • Tricyclic Antidepressants Screen 08/17/2022 Negative  Negative Final   • Methadone Screen, Urine 08/17/2022 Negative  Negative Final   • Barbiturates Screen, Urine 08/17/2022 Negative  Negative Final   • Oxycodone Screen, Urine 08/17/2022 Negative  Negative Final   • Propoxyphene Screen 08/17/2022 Negative  Negative Final   • Buprenorphine, Screen, Urine 08/17/2022 Negative  Negative Final   • ABO Type 08/17/2022 O   Final   • RH type  08/17/2022 Negative   Final   • Antibody Screen 08/17/2022 Positive   Final   • T&S Expiration Date 08/17/2022 8/20/2022 11:59:59 PM   Final   • WBC 08/17/2022 15.40 (H)  3.40 - 10.80 10*3/mm3 Final   • RBC 08/17/2022 3.95  3.77 - 5.28 10*6/mm3 Final   • Hemoglobin 08/17/2022 11.7 (L)  12.0 - 15.9 g/dL Final   • Hematocrit 08/17/2022 34.3  34.0 - 46.6 % Final   • MCV 08/17/2022 86.8  79.0 - 97.0 fL Final   • MCH 08/17/2022 29.6  26.6 - 33.0 pg Final   • MCHC 08/17/2022 34.1  31.5 - 35.7 g/dL Final   • RDW 08/17/2022 14.4  12.3 - 15.4 % Final   • RDW-SD 08/17/2022 45.7  37.0 - 54.0 fl Final   • MPV 08/17/2022 10.4  6.0 - 12.0 fL Final   • Platelets 08/17/2022 180  140 - 450 10*3/mm3 Final   • Neutrophil % 08/17/2022 84.0 (H)  42.7 - 76.0 % Final   • Lymphocyte % 08/17/2022 11.2 (L)  19.6 - 45.3 % Final   • Monocyte % 08/17/2022 3.8 (L)  5.0 - 12.0 % Final   • Eosinophil % 08/17/2022 0.1 (L)  0.3 - 6.2 % Final   • Basophil % 08/17/2022 0.3  0.0 - 1.5 % Final   • Immature Grans % 08/17/2022 0.6 (H)  0.0 - 0.5 % Final   • Neutrophils, Absolute 08/17/2022 12.95 (H)  1.70 - 7.00 10*3/mm3 Final   • Lymphocytes, Absolute 08/17/2022 1.72  0.70 - 3.10 10*3/mm3 Final   • Monocytes, Absolute 08/17/2022 0.59  0.10 - 0.90 10*3/mm3 Final   • Eosinophils, Absolute 08/17/2022 0.01  0.00 - 0.40 10*3/mm3 Final   • Basophils, Absolute 08/17/2022 0.04  0.00 - 0.20 10*3/mm3 Final   • Immature Grans, Absolute 08/17/2022 0.09 (H)  0.00 - 0.05 10*3/mm3 Final   • nRBC 08/17/2022 0.0  0.0 - 0.2 /100 WBC Final   • COVID19 08/17/2022 Not Detected  Not Detected - Ref. Range Final   • Residual RhIG Detected 08/17/2022 RESIDUAL RHIG DETECTED   Final   • Color, UA 08/17/2022 Orange (A)  Yellow, Straw Final   • Appearance, UA 08/17/2022 Turbid (A)  Clear Final   • pH, UA 08/17/2022 6.0  5.0 - 8.0 Final   • Specific Gravity, UA 08/17/2022 1.021  1.005 - 1.030 Final   • Glucose, UA 08/17/2022 Negative  Negative Final   • Ketones, UA 08/17/2022 40  mg/dL (2+) (A)  Negative Final   • Bilirubin, UA 08/17/2022 Negative  Negative Final   • Blood, UA 08/17/2022 Moderate (2+) (A)  Negative Final   • Protein, UA 08/17/2022 100 mg/dL (2+) (A)  Negative Final   • Leuk Esterase, UA 08/17/2022 Moderate (2+) (A)  Negative Final   • Nitrite, UA 08/17/2022 Positive (A)  Negative Final   • Urobilinogen, UA 08/17/2022 1.0 E.U./dL  0.2 - 1.0 E.U./dL Final   • RBC, UA 08/17/2022 21-30 (A)  None Seen, 0-2 /HPF Final   • WBC, UA 08/17/2022 Too Numerous to Count (A)  None Seen, 0-2 /HPF Final   • Bacteria, UA 08/17/2022 4+ (A)  None Seen /HPF Final   • Squamous Epithelial Cells, UA 08/17/2022 0-2  None Seen, 0-2 /HPF Final   • Hyaline Casts, UA 08/17/2022 0-2  None Seen /LPF Final   • Methodology 08/17/2022 Manual Light Microscopy   Final   • Urine Culture 08/17/2022 >100,000 CFU/mL Klebsiella pneumoniae ssp pneumoniae (A)   Final   • Urine Culture 08/17/2022 >100,000 CFU/mL Escherichia coli (A)   Final   • WBC 08/18/2022 11.57 (H)  3.40 - 10.80 10*3/mm3 Final   • RBC 08/18/2022 3.64 (L)  3.77 - 5.28 10*6/mm3 Final   • Hemoglobin 08/18/2022 10.7 (L)  12.0 - 15.9 g/dL Final   • Hematocrit 08/18/2022 32.3 (L)  34.0 - 46.6 % Final   • MCV 08/18/2022 88.7  79.0 - 97.0 fL Final   • MCH 08/18/2022 29.4  26.6 - 33.0 pg Final   • MCHC 08/18/2022 33.1  31.5 - 35.7 g/dL Final   • RDW 08/18/2022 14.4  12.3 - 15.4 % Final   • RDW-SD 08/18/2022 46.0  37.0 - 54.0 fl Final   • MPV 08/18/2022 10.8  6.0 - 12.0 fL Final   • Platelets 08/18/2022 152  140 - 450 10*3/mm3 Final   • Neutrophil % 08/18/2022 79.2 (H)  42.7 - 76.0 % Final   • Lymphocyte % 08/18/2022 14.5 (L)  19.6 - 45.3 % Final   • Monocyte % 08/18/2022 4.8 (L)  5.0 - 12.0 % Final   • Eosinophil % 08/18/2022 0.7  0.3 - 6.2 % Final   • Basophil % 08/18/2022 0.3  0.0 - 1.5 % Final   • Immature Grans % 08/18/2022 0.5  0.0 - 0.5 % Final   • Neutrophils, Absolute 08/18/2022 9.16 (H)  1.70 - 7.00 10*3/mm3 Final   • Lymphocytes, Absolute  08/18/2022 1.68  0.70 - 3.10 10*3/mm3 Final   • Monocytes, Absolute 08/18/2022 0.55  0.10 - 0.90 10*3/mm3 Final   • Eosinophils, Absolute 08/18/2022 0.08  0.00 - 0.40 10*3/mm3 Final   • Basophils, Absolute 08/18/2022 0.04  0.00 - 0.20 10*3/mm3 Final   • Immature Grans, Absolute 08/18/2022 0.06 (H)  0.00 - 0.05 10*3/mm3 Final   • nRBC 08/18/2022 0.0  0.0 - 0.2 /100 WBC Final   • ABO Type 08/18/2022 O   Final   • RH type 08/18/2022 Negative   Final   • Fetal Bleed Screen 08/18/2022 Negative   Final   • Number of Doses 08/18/2022 Fetal Screen Negative - Recommend 1 vial of RhIg   Final   Admission on 07/16/2022, Discharged on 07/18/2022   Component Date Value Ref Range Status   • Color, UA 07/16/2022 Yellow  Yellow, Straw Final   • Appearance, UA 07/16/2022 Clear  Clear Final   • pH, UA 07/16/2022 7.0  5.0 - 8.0 Final   • Specific Gravity, UA 07/16/2022 1.015  1.005 - 1.030 Final   • Glucose, UA 07/16/2022 Negative  Negative Final   • Ketones, UA 07/16/2022 Negative  Negative Final   • Bilirubin, UA 07/16/2022 Negative  Negative Final   • Blood, UA 07/16/2022 Negative  Negative Final   • Protein, UA 07/16/2022 Negative  Negative Final   • Leuk Esterase, UA 07/16/2022 Trace (A)  Negative Final   • Nitrite, UA 07/16/2022 Negative  Negative Final   • Urobilinogen, UA 07/16/2022 1.0 E.U./dL  0.2 - 1.0 E.U./dL Final   • THC, Screen, Urine 07/16/2022 Negative  Negative Final   • Phencyclidine (PCP), Urine 07/16/2022 Negative  Negative Final   • Cocaine Screen, Urine 07/16/2022 Negative  Negative Final   • Methamphetamine, Ur 07/16/2022 Negative  Negative Final   • Opiate Screen 07/16/2022 Negative  Negative Final   • Amphetamine Screen, Urine 07/16/2022 Negative  Negative Final   • Benzodiazepine Screen, Urine 07/16/2022 Negative  Negative Final   • Tricyclic Antidepressants Screen 07/16/2022 Negative  Negative Final   • Methadone Screen, Urine 07/16/2022 Negative  Negative Final   • Barbiturates Screen, Urine 07/16/2022  Negative  Negative Final   • Oxycodone Screen, Urine 07/16/2022 Negative  Negative Final   • Propoxyphene Screen 07/16/2022 Negative  Negative Final   • Buprenorphine, Screen, Urine 07/16/2022 Negative  Negative Final   • RBC, UA 07/16/2022 None Seen  None Seen, 0-2 /HPF Final   • WBC, UA 07/16/2022 6-12 (A)  None Seen, 0-2 /HPF Final   • Bacteria, UA 07/16/2022 1+ (A)  None Seen /HPF Final   • Squamous Epithelial Cells, UA 07/16/2022 0-2  None Seen, 0-2 /HPF Final   • Hyaline Casts, UA 07/16/2022 None Seen  None Seen /LPF Final   • Methodology 07/16/2022 Automated Microscopy   Final   • Urine Culture 07/16/2022 >100,000 CFU/mL Staphylococcus, coagulase negative (A)   Final   • COVID19 07/16/2022 Not Detected  Not Detected - Ref. Range Final   • WBC 07/16/2022 12.78 (H)  3.40 - 10.80 10*3/mm3 Final   • RBC 07/16/2022 3.83  3.77 - 5.28 10*6/mm3 Final   • Hemoglobin 07/16/2022 11.4 (L)  12.0 - 15.9 g/dL Final   • Hematocrit 07/16/2022 33.6 (L)  34.0 - 46.6 % Final   • MCV 07/16/2022 87.7  79.0 - 97.0 fL Final   • MCH 07/16/2022 29.8  26.6 - 33.0 pg Final   • MCHC 07/16/2022 33.9  31.5 - 35.7 g/dL Final   • RDW 07/16/2022 13.8  12.3 - 15.4 % Final   • RDW-SD 07/16/2022 44.3  37.0 - 54.0 fl Final   • MPV 07/16/2022 10.1  6.0 - 12.0 fL Final   • Platelets 07/16/2022 150  140 - 450 10*3/mm3 Final   • ABO Type 07/16/2022 O   Final   • RH type 07/16/2022 Negative   Final   • Antibody Screen 07/16/2022 Positive   Final   • T&S Expiration Date 07/16/2022 7/19/2022 11:59:59 PM   Final   • Residual RhIG Detected 07/16/2022 RESIDUAL RHIG DETECTED   Final   • Glucose 07/16/2022 132 (H)  70 - 130 mg/dL Final    Meter: [id number] : [id number] [place] Nathalie JOHN   • HIV-1/ HIV-2 07/16/2022 Non-Reactive  Non-Reactive Final    A non-reactive test result does not preclude the possibility of exposure to HIV or infection with HIV. An antibody response to recent exposure may take several months to reach detectable levels.   • External  HIV Antibody 02/28/2022 Non-Reactive   Final   • Glucose 07/16/2022 133 (H)  70 - 130 mg/dL Final    Meter: [id number] : [id number] TRIP LERNER   • Glucose 07/17/2022 112  70 - 130 mg/dL Final    Meter: [id number] : [id number] John FLORIAN   • Glucose 07/17/2022 128  70 - 130 mg/dL Final    Meter: [id number] : [id number] Layla Gutierres   • Glucose 07/17/2022 134 (H)  70 - 130 mg/dL Final    Meter: [id number] : [id number] Layla Gutierres   Admission on 06/28/2022, Discharged on 06/30/2022   Component Date Value Ref Range Status   • ABO Type 06/28/2022 O   Final   • RH type 06/28/2022 Negative   Final   • Antibody Screen 06/28/2022 Negative   Final   • T&S Expiration Date 06/28/2022 7/1/2022 11:59:59 PM   Final   • Number of Doses 06/28/2022 Recommend 1 vial of RhIg   Final   • THC, Screen, Urine 06/28/2022 Negative  Negative Final   • Phencyclidine (PCP), Urine 06/28/2022 Negative  Negative Final   • Cocaine Screen, Urine 06/28/2022 Negative  Negative Final   • Methamphetamine, Ur 06/28/2022 Negative  Negative Final   • Opiate Screen 06/28/2022 Negative  Negative Final   • Amphetamine Screen, Urine 06/28/2022 Negative  Negative Final   • Benzodiazepine Screen, Urine 06/28/2022 Negative  Negative Final   • Tricyclic Antidepressants Screen 06/28/2022 Negative  Negative Final   • Methadone Screen, Urine 06/28/2022 Negative  Negative Final   • Barbiturates Screen, Urine 06/28/2022 Negative  Negative Final   • Oxycodone Screen, Urine 06/28/2022 Negative  Negative Final   • Propoxyphene Screen 06/28/2022 Negative  Negative Final   • Buprenorphine, Screen, Urine 06/28/2022 Negative  Negative Final   • Group B Strep Culture 06/28/2022 Streptococcus agalactiae (Group B) (A)   Final    This organism is considered to be universally susceptible to penicillin.  No further antibiotic testing will be performed. If Clindamycin or Erythromycin is the drug of choice, notify the laboratory within 7 days to request  susceptibility testing.   • COVID19 06/28/2022 Not Detected  Not Detected - Ref. Range Final   • Glucose, 1Hr PP 06/29/2022 263  50 - 400 mg/dL Final         Assessment & Plan   Diagnoses and all orders for this visit:    1. Generalized anxiety disorder (Primary)  -     sertraline (Zoloft) 25 MG tablet; Take 1 tablet by mouth Daily.  Dispense: 30 tablet; Refill: 0    2. Bipolar II disorder (HCC)  -     Discontinue: lamoTRIgine (LaMICtal) 100 MG tablet; Take 1 tablet by mouth Daily.  Dispense: 30 tablet; Refill: 0  -     sertraline (Zoloft) 25 MG tablet; Take 1 tablet by mouth Daily.  Dispense: 30 tablet; Refill: 0    3. Sleep disturbance  -     traZODone (DESYREL) 150 MG tablet; Take 1 tablet by mouth Every Night for 30 days.  Dispense: 30 tablet; Refill: 0    4. Medication management  -     Discontinue: lamoTRIgine (LaMICtal) 100 MG tablet; Take 1 tablet by mouth Daily.  Dispense: 30 tablet; Refill: 0  -     sertraline (Zoloft) 25 MG tablet; Take 1 tablet by mouth Daily.  Dispense: 30 tablet; Refill: 0  -     traZODone (DESYREL) 150 MG tablet; Take 1 tablet by mouth Every Night for 30 days.  Dispense: 30 tablet; Refill: 0        Visit Diagnoses:    ICD-10-CM ICD-9-CM   1. Generalized anxiety disorder  F41.1 300.02   2. Bipolar II disorder (HCC)  F31.81 296.89   3. Sleep disturbance  G47.9 780.50   4. Medication management  Z79.899 V58.69     Patient continues to need labs previously ordered.  She is advised to see PCP should rash worsen or not improve, Lamictal reduced to 50 mg x 4 days and then discontinued.  Lexapro discontinued, Zoloft initiated.  Trazodone increased to 150 mg nightly.  Patient is educated upon risks versus benefits as well as side effects and when to seek care in an emergency setting.  Patient verbalized understanding. Counseling will continue to be encouraged.  Follow-up scheduled in 2 weeks to revisit mood stabilizer.    GOALS:  Short Term Goals: Patient will be compliant with medication, and  patient will have no significant medication related side effects.  Patient will be engaged in psychotherapy as indicated.  Patient will report subjective improvement of symptoms.  Long term goals: To stabilize mood and treat/improve subjective symptoms, the patient will stay out of the hospital, the patient will be at an optimal level of functioning, and the patient will take all medications as prescribed.  The patient/guardian verbalized understanding and agreement with goals that were mutually set.        TREATMENT PLAN: Continue supportive psychotherapy efforts and medications as indicated.  Pharmacological and Non-Pharmacological treatment options discussed during today's visit. Patient/Guardian acknowledged and verbally consented with current treatment plan and was educated on the importance of compliance with treatment and follow-up appointments.      MEDICATION ISSUES:  Discussed medication options and treatment plan of prescribed medication as well as the risks, benefits, any black box warnings, and side effects including potential falls, possible impaired driving, and metabolic adversities among others. Patient is agreeable to call the office with any worsening of symptoms or onset of side effects, or if any concerns or questions arise.  The contact information for the office is made available to the patient. Patient is agreeable to call 911 or go to the nearest ER should they begin having any SI/HI, or if any urgent concerns arise. No medication side effects or related complaints today.     MEDS ORDERED DURING VISIT:  New Medications Ordered This Visit   Medications   • sertraline (Zoloft) 25 MG tablet     Sig: Take 1 tablet by mouth Daily.     Dispense:  30 tablet     Refill:  0   • traZODone (DESYREL) 150 MG tablet     Sig: Take 1 tablet by mouth Every Night for 30 days.     Dispense:  30 tablet     Refill:  0       Follow Up Appointment:   Return in about 2 weeks (around 1/12/2023) for Recheck.      Unable to complete visit using a video connection to the patient. A phone visit was used to complete this visits. Total time of discussion was 29 minutes.               This document has been electronically signed by MOR Martin  December 29, 2022 08:32 EST  Some of the data in this electronic note has been brought forward from a previous encounter, any necessary changes have been made, it has been reviewed by this APRN, and it is accurate.    Dictated Utilizing Dragon Dictation: Part of this note may be an electronic transcription/translation of spoken language to printed text using the Dragon Dictation System.

## 2022-12-29 ENCOUNTER — TELEMEDICINE (OUTPATIENT)
Dept: PSYCHIATRY | Facility: CLINIC | Age: 22
End: 2022-12-29

## 2022-12-29 DIAGNOSIS — F31.81 BIPOLAR II DISORDER: ICD-10-CM

## 2022-12-29 DIAGNOSIS — G47.9 SLEEP DISTURBANCE: ICD-10-CM

## 2022-12-29 DIAGNOSIS — Z79.899 MEDICATION MANAGEMENT: ICD-10-CM

## 2022-12-29 DIAGNOSIS — F41.1 GENERALIZED ANXIETY DISORDER: Primary | ICD-10-CM

## 2022-12-29 PROCEDURE — 99214 OFFICE O/P EST MOD 30 MIN: CPT

## 2022-12-29 RX ORDER — TRAZODONE HYDROCHLORIDE 150 MG/1
150 TABLET ORAL NIGHTLY
Qty: 30 TABLET | Refills: 0 | Status: SHIPPED | OUTPATIENT
Start: 2022-12-29 | End: 2023-01-28

## 2022-12-29 RX ORDER — LAMOTRIGINE 100 MG/1
100 TABLET ORAL DAILY
Qty: 30 TABLET | Refills: 0 | Status: SHIPPED | OUTPATIENT
Start: 2022-12-29 | End: 2022-12-29 | Stop reason: SINTOL

## 2022-12-29 RX ORDER — SERTRALINE HYDROCHLORIDE 25 MG/1
25 TABLET, FILM COATED ORAL DAILY
Qty: 30 TABLET | Refills: 0 | Status: SHIPPED | OUTPATIENT
Start: 2022-12-29 | End: 2023-12-29

## 2023-05-24 LAB
EXTERNAL HEPATITIS B SURFACE ANTIGEN: NEGATIVE
EXTERNAL RUBELLA QUALITATIVE: NORMAL
EXTERNAL SYPHILIS RPR SCREEN: NORMAL
HIV1 P24 AG SERPL QL IA: NORMAL

## 2023-06-09 LAB — EXTERNAL GTT 1 HOUR: 145

## 2023-06-24 PROBLEM — R10.9 ABDOMINAL PAIN DURING PREGNANCY IN THIRD TRIMESTER: Status: ACTIVE | Noted: 2023-06-24

## 2023-06-24 PROBLEM — O26.893 ABDOMINAL PAIN DURING PREGNANCY IN THIRD TRIMESTER: Status: ACTIVE | Noted: 2023-06-24

## 2023-06-26 PROBLEM — N94.89 UTERINE CRAMPING: Status: ACTIVE | Noted: 2023-06-26

## 2023-07-27 LAB — EXTERNAL GROUP B STREP ANTIGEN: NORMAL

## 2023-07-31 ENCOUNTER — HOSPITAL ENCOUNTER (OUTPATIENT)
Facility: HOSPITAL | Age: 23
Discharge: HOME | End: 2023-07-31
Attending: OBSTETRICS & GYNECOLOGY | Admitting: OBSTETRICS & GYNECOLOGY
Payer: COMMERCIAL

## 2023-07-31 VITALS
HEART RATE: 75 BPM | HEIGHT: 65 IN | BODY MASS INDEX: 31.96 KG/M2 | WEIGHT: 191.8 LBS | RESPIRATION RATE: 16 BRPM | SYSTOLIC BLOOD PRESSURE: 127 MMHG | TEMPERATURE: 97.5 F | DIASTOLIC BLOOD PRESSURE: 55 MMHG

## 2023-07-31 PROBLEM — O47.00 PRETERM CONTRACTIONS: Status: ACTIVE | Noted: 2023-07-31

## 2023-07-31 LAB
BACTERIA UR QL AUTO: ABNORMAL /HPF
BILIRUB UR QL STRIP: NEGATIVE
CLARITY UR: CLEAR
COLOR UR: YELLOW
DEPRECATED RDW RBC AUTO: 45.3 FL (ref 37–54)
ERYTHROCYTE [DISTWIDTH] IN BLOOD BY AUTOMATED COUNT: 13.9 % (ref 12.3–15.4)
GLUCOSE UR STRIP-MCNC: NEGATIVE MG/DL
HCT VFR BLD AUTO: 32.4 % (ref 34–46.6)
HGB BLD-MCNC: 10.7 G/DL (ref 12–15.9)
HGB UR QL STRIP.AUTO: ABNORMAL
HYALINE CASTS UR QL AUTO: ABNORMAL /LPF
KETONES UR QL STRIP: ABNORMAL
LEUKOCYTE ESTERASE UR QL STRIP.AUTO: ABNORMAL
MCH RBC QN AUTO: 29.6 PG (ref 26.6–33)
MCHC RBC AUTO-ENTMCNC: 33 G/DL (ref 31.5–35.7)
MCV RBC AUTO: 89.5 FL (ref 79–97)
NITRITE UR QL STRIP: NEGATIVE
PH UR STRIP.AUTO: 6.5 [PH] (ref 5–8)
PLATELET # BLD AUTO: 161 10*3/MM3 (ref 140–450)
PMV BLD AUTO: 10.7 FL (ref 6–12)
PROT UR QL STRIP: ABNORMAL
RBC # BLD AUTO: 3.62 10*6/MM3 (ref 3.77–5.28)
RBC # UR STRIP: ABNORMAL /HPF
REF LAB TEST METHOD: ABNORMAL
SP GR UR STRIP: 1.03 (ref 1–1.03)
SQUAMOUS #/AREA URNS HPF: ABNORMAL /HPF
UROBILINOGEN UR QL STRIP: ABNORMAL
WBC # UR STRIP: ABNORMAL /HPF
WBC NRBC COR # BLD: 11.76 10*3/MM3 (ref 3.4–10.8)

## 2023-07-31 PROCEDURE — 87186 SC STD MICRODIL/AGAR DIL: CPT | Performed by: OBSTETRICS & GYNECOLOGY

## 2023-07-31 PROCEDURE — 96372 THER/PROPH/DIAG INJ SC/IM: CPT

## 2023-07-31 PROCEDURE — 87086 URINE CULTURE/COLONY COUNT: CPT | Performed by: OBSTETRICS & GYNECOLOGY

## 2023-07-31 PROCEDURE — 25010000002 CEFTRIAXONE PER 250 MG: Performed by: OBSTETRICS & GYNECOLOGY

## 2023-07-31 PROCEDURE — 59025 FETAL NON-STRESS TEST: CPT

## 2023-07-31 PROCEDURE — 36415 COLL VENOUS BLD VENIPUNCTURE: CPT | Performed by: OBSTETRICS & GYNECOLOGY

## 2023-07-31 PROCEDURE — 81001 URINALYSIS AUTO W/SCOPE: CPT | Performed by: OBSTETRICS & GYNECOLOGY

## 2023-07-31 PROCEDURE — G0463 HOSPITAL OUTPT CLINIC VISIT: HCPCS

## 2023-07-31 PROCEDURE — 87077 CULTURE AEROBIC IDENTIFY: CPT | Performed by: OBSTETRICS & GYNECOLOGY

## 2023-07-31 PROCEDURE — 85027 COMPLETE CBC AUTOMATED: CPT | Performed by: OBSTETRICS & GYNECOLOGY

## 2023-07-31 RX ORDER — NITROFURANTOIN MONOHYDRATE/MACROCRYSTALLINE 25; 75 MG/1; MG/1
100 CAPSULE ORAL 2 TIMES DAILY
Qty: 14 CAPSULE | Refills: 0 | Status: SHIPPED | OUTPATIENT
Start: 2023-07-31 | End: 2023-08-07

## 2023-07-31 RX ADMIN — LIDOCAINE HYDROCHLORIDE 1 G: 10 INJECTION, SOLUTION EPIDURAL; INFILTRATION; INTRACAUDAL; PERINEURAL at 20:44

## 2023-08-02 LAB — BACTERIA SPEC AEROBE CULT: ABNORMAL

## 2023-08-04 LAB
EXTERNAL CHLAMYDIA SCREEN: NORMAL
EXTERNAL GONORRHEA SCREEN: NORMAL

## 2023-08-09 ENCOUNTER — HOSPITAL ENCOUNTER (INPATIENT)
Facility: HOSPITAL | Age: 23
LOS: 2 days | Discharge: HOME | End: 2023-08-12
Attending: OBSTETRICS & GYNECOLOGY | Admitting: OBSTETRICS & GYNECOLOGY
Payer: COMMERCIAL

## 2023-08-09 PROBLEM — O47.9 UTERINE CONTRACTIONS: Status: ACTIVE | Noted: 2023-08-09

## 2023-08-09 LAB
ABO GROUP BLD: NORMAL
AMPHET+METHAMPHET UR QL: NEGATIVE
AMPHETAMINES UR QL: NEGATIVE
BARBITURATES UR QL SCN: NEGATIVE
BASOPHILS # BLD AUTO: 0.04 10*3/MM3 (ref 0–0.2)
BASOPHILS NFR BLD AUTO: 0.3 % (ref 0–1.5)
BENZODIAZ UR QL SCN: NEGATIVE
BLD GP AB SCN SERPL QL: POSITIVE
BUPRENORPHINE SERPL-MCNC: NEGATIVE NG/ML
CANNABINOIDS SERPL QL: NEGATIVE
COCAINE UR QL: NEGATIVE
DEPRECATED RDW RBC AUTO: 45 FL (ref 37–54)
EOSINOPHIL # BLD AUTO: 0.13 10*3/MM3 (ref 0–0.4)
EOSINOPHIL NFR BLD AUTO: 1.1 % (ref 0.3–6.2)
ERYTHROCYTE [DISTWIDTH] IN BLOOD BY AUTOMATED COUNT: 14 % (ref 12.3–15.4)
FENTANYL UR-MCNC: NEGATIVE NG/ML
HCT VFR BLD AUTO: 33.7 % (ref 34–46.6)
HGB BLD-MCNC: 11.1 G/DL (ref 12–15.9)
IMM GRANULOCYTES # BLD AUTO: 0.07 10*3/MM3 (ref 0–0.05)
IMM GRANULOCYTES NFR BLD AUTO: 0.6 % (ref 0–0.5)
LYMPHOCYTES # BLD AUTO: 2.99 10*3/MM3 (ref 0.7–3.1)
LYMPHOCYTES NFR BLD AUTO: 24.9 % (ref 19.6–45.3)
MCH RBC QN AUTO: 29.3 PG (ref 26.6–33)
MCHC RBC AUTO-ENTMCNC: 32.9 G/DL (ref 31.5–35.7)
MCV RBC AUTO: 88.9 FL (ref 79–97)
METHADONE UR QL SCN: NEGATIVE
MONOCYTES # BLD AUTO: 0.5 10*3/MM3 (ref 0.1–0.9)
MONOCYTES NFR BLD AUTO: 4.2 % (ref 5–12)
NEUTROPHILS NFR BLD AUTO: 68.9 % (ref 42.7–76)
NEUTROPHILS NFR BLD AUTO: 8.26 10*3/MM3 (ref 1.7–7)
NRBC BLD AUTO-RTO: 0 /100 WBC (ref 0–0.2)
OPIATES UR QL: NEGATIVE
OXYCODONE UR QL SCN: NEGATIVE
PCP UR QL SCN: NEGATIVE
PLATELET # BLD AUTO: 173 10*3/MM3 (ref 140–450)
PMV BLD AUTO: 10.6 FL (ref 6–12)
PROPOXYPH UR QL: NEGATIVE
RBC # BLD AUTO: 3.79 10*6/MM3 (ref 3.77–5.28)
RESIDUAL RHIG DETECTED: NORMAL
RH BLD: NEGATIVE
T&S EXPIRATION DATE: NORMAL
TRICYCLICS UR QL SCN: NEGATIVE
WBC NRBC COR # BLD: 11.99 10*3/MM3 (ref 3.4–10.8)

## 2023-08-09 PROCEDURE — 25010000002 ONDANSETRON PER 1 MG: Performed by: OBSTETRICS & GYNECOLOGY

## 2023-08-09 PROCEDURE — 86901 BLOOD TYPING SEROLOGIC RH(D): CPT | Performed by: OBSTETRICS & GYNECOLOGY

## 2023-08-09 PROCEDURE — 36415 COLL VENOUS BLD VENIPUNCTURE: CPT | Performed by: OBSTETRICS & GYNECOLOGY

## 2023-08-09 PROCEDURE — G0463 HOSPITAL OUTPT CLINIC VISIT: HCPCS

## 2023-08-09 PROCEDURE — 59025 FETAL NON-STRESS TEST: CPT

## 2023-08-09 PROCEDURE — 86900 BLOOD TYPING SEROLOGIC ABO: CPT | Performed by: OBSTETRICS & GYNECOLOGY

## 2023-08-09 PROCEDURE — 86870 RBC ANTIBODY IDENTIFICATION: CPT | Performed by: OBSTETRICS & GYNECOLOGY

## 2023-08-09 PROCEDURE — 85025 COMPLETE CBC W/AUTO DIFF WBC: CPT | Performed by: OBSTETRICS & GYNECOLOGY

## 2023-08-09 PROCEDURE — 86850 RBC ANTIBODY SCREEN: CPT | Performed by: OBSTETRICS & GYNECOLOGY

## 2023-08-09 PROCEDURE — 80307 DRUG TEST PRSMV CHEM ANLYZR: CPT | Performed by: OBSTETRICS & GYNECOLOGY

## 2023-08-09 RX ORDER — FAMOTIDINE 10 MG/ML
20 INJECTION, SOLUTION INTRAVENOUS 2 TIMES DAILY PRN
Status: DISCONTINUED | OUTPATIENT
Start: 2023-08-09 | End: 2023-08-10

## 2023-08-09 RX ORDER — ONDANSETRON 2 MG/ML
4 INJECTION INTRAMUSCULAR; INTRAVENOUS EVERY 6 HOURS PRN
Status: DISCONTINUED | OUTPATIENT
Start: 2023-08-09 | End: 2023-08-10 | Stop reason: SDUPTHER

## 2023-08-09 RX ORDER — SODIUM CHLORIDE, SODIUM LACTATE, POTASSIUM CHLORIDE, CALCIUM CHLORIDE 600; 310; 30; 20 MG/100ML; MG/100ML; MG/100ML; MG/100ML
125 INJECTION, SOLUTION INTRAVENOUS CONTINUOUS
Status: DISCONTINUED | OUTPATIENT
Start: 2023-08-09 | End: 2023-08-10

## 2023-08-09 RX ORDER — HYDROXYZINE HYDROCHLORIDE 25 MG/1
50 TABLET, FILM COATED ORAL 3 TIMES DAILY PRN
Status: DISCONTINUED | OUTPATIENT
Start: 2023-08-09 | End: 2023-08-10

## 2023-08-09 RX ORDER — PRENATAL VIT/IRON FUM/FOLIC AC 27MG-0.8MG
1 TABLET ORAL DAILY
Status: DISCONTINUED | OUTPATIENT
Start: 2023-08-10 | End: 2023-08-10

## 2023-08-09 RX ORDER — ACETAMINOPHEN 500 MG
1000 TABLET ORAL EVERY 6 HOURS PRN
Status: DISCONTINUED | OUTPATIENT
Start: 2023-08-09 | End: 2023-08-10 | Stop reason: SDUPTHER

## 2023-08-09 RX ADMIN — SODIUM CHLORIDE, POTASSIUM CHLORIDE, SODIUM LACTATE AND CALCIUM CHLORIDE 125 ML/HR: 600; 310; 30; 20 INJECTION, SOLUTION INTRAVENOUS at 19:16

## 2023-08-09 RX ADMIN — ACETAMINOPHEN 1000 MG: 500 TABLET ORAL at 21:46

## 2023-08-09 RX ADMIN — ONDANSETRON 4 MG: 2 INJECTION INTRAMUSCULAR; INTRAVENOUS at 19:59

## 2023-08-09 RX ADMIN — SODIUM CHLORIDE, POTASSIUM CHLORIDE, SODIUM LACTATE AND CALCIUM CHLORIDE 125 ML/HR: 600; 310; 30; 20 INJECTION, SOLUTION INTRAVENOUS at 20:04

## 2023-08-09 RX ADMIN — FAMOTIDINE 20 MG: 10 INJECTION INTRAVENOUS at 19:59

## 2023-08-09 RX ADMIN — HYDROXYZINE HYDROCHLORIDE 50 MG: 25 TABLET, FILM COATED ORAL at 21:46

## 2023-08-09 NOTE — NON STRESS TEST
Bianca Nieves, a  at 37w5d with an JAME of 2023, by Ultrasound, was seen at [facility] LABOR DELIVERY for a nonstress test.    Chief Complaint   Patient presents with    Contractions       Patient Active Problem List   Diagnosis    Right upper quadrant abdominal pain    Leukocytosis    Abdominal pain affecting pregnancy    Pregnancy    Abdominal pain during pregnancy    Visit for wound check    UTI (urinary tract infection)     labor    Threatened  labor, third trimester    33 weeks gestation of pregnancy    Normal labor     labor in third trimester    37 weeks gestation of pregnancy    Generalized anxiety disorder    Abdominal pain during pregnancy in third trimester    Uterine cramping     contractions    Uterine contractions       Start Time: 1840  Stop Time: 1900    Interpretation A  Nonstress Test Interpretation A: Reactive  Comments A: confirmed with Mary Grace LOPEZ RN

## 2023-08-10 ENCOUNTER — ANESTHESIA (OUTPATIENT)
Dept: LABOR AND DELIVERY | Facility: HOSPITAL | Age: 23
End: 2023-08-10
Payer: COMMERCIAL

## 2023-08-10 ENCOUNTER — ANESTHESIA EVENT (OUTPATIENT)
Dept: LABOR AND DELIVERY | Facility: HOSPITAL | Age: 23
End: 2023-08-10
Payer: COMMERCIAL

## 2023-08-10 VITALS
HEART RATE: 120 BPM | TEMPERATURE: 97.9 F | SYSTOLIC BLOOD PRESSURE: 126 MMHG | DIASTOLIC BLOOD PRESSURE: 60 MMHG | OXYGEN SATURATION: 100 %

## 2023-08-10 PROCEDURE — C1755 CATHETER, INTRASPINAL: HCPCS | Performed by: NURSE ANESTHETIST, CERTIFIED REGISTERED

## 2023-08-10 PROCEDURE — 0 PENICILLIN G POTASSIUM PER 600000 UNITS: Performed by: OBSTETRICS & GYNECOLOGY

## 2023-08-10 PROCEDURE — 25010000002 FENTANYL CITRATE (PF) 50 MCG/ML SOLUTION: Performed by: NURSE ANESTHETIST, CERTIFIED REGISTERED

## 2023-08-10 PROCEDURE — 25010000002 ROPIVACAINE PER 1 MG: Performed by: NURSE ANESTHETIST, CERTIFIED REGISTERED

## 2023-08-10 PROCEDURE — C1755 CATHETER, INTRASPINAL: HCPCS

## 2023-08-10 RX ORDER — ACETAMINOPHEN 325 MG/1
650 TABLET ORAL EVERY 4 HOURS PRN
Status: DISCONTINUED | OUTPATIENT
Start: 2023-08-10 | End: 2023-08-10 | Stop reason: SDUPTHER

## 2023-08-10 RX ORDER — MINERAL OIL
OIL (ML) MISCELLANEOUS AS NEEDED
Status: DISCONTINUED | OUTPATIENT
Start: 2023-08-10 | End: 2023-08-10

## 2023-08-10 RX ORDER — HYDROCODONE BITARTRATE AND ACETAMINOPHEN 10; 325 MG/1; MG/1
1 TABLET ORAL EVERY 4 HOURS PRN
Status: DISCONTINUED | OUTPATIENT
Start: 2023-08-10 | End: 2023-08-12 | Stop reason: HOSPADM

## 2023-08-10 RX ORDER — ONDANSETRON 2 MG/ML
4 INJECTION INTRAMUSCULAR; INTRAVENOUS EVERY 6 HOURS PRN
Status: DISCONTINUED | OUTPATIENT
Start: 2023-08-10 | End: 2023-08-10 | Stop reason: SDUPTHER

## 2023-08-10 RX ORDER — OXYTOCIN/0.9 % SODIUM CHLORIDE 30/500 ML
250 PLASTIC BAG, INJECTION (ML) INTRAVENOUS CONTINUOUS
Status: ACTIVE | OUTPATIENT
Start: 2023-08-10 | End: 2023-08-10

## 2023-08-10 RX ORDER — ROPIVACAINE HYDROCHLORIDE 2 MG/ML
INJECTION, SOLUTION EPIDURAL; INFILTRATION; PERINEURAL AS NEEDED
Status: DISCONTINUED | OUTPATIENT
Start: 2023-08-10 | End: 2023-08-10 | Stop reason: SURG

## 2023-08-10 RX ORDER — OXYTOCIN/0.9 % SODIUM CHLORIDE 30/500 ML
2-20 PLASTIC BAG, INJECTION (ML) INTRAVENOUS
Status: DISCONTINUED | OUTPATIENT
Start: 2023-08-10 | End: 2023-08-10

## 2023-08-10 RX ORDER — MAGNESIUM HYDROXIDE 1200 MG/15ML
1000 LIQUID ORAL ONCE AS NEEDED
Status: DISCONTINUED | OUTPATIENT
Start: 2023-08-10 | End: 2023-08-10

## 2023-08-10 RX ORDER — OXYTOCIN/0.9 % SODIUM CHLORIDE 30/500 ML
999 PLASTIC BAG, INJECTION (ML) INTRAVENOUS ONCE
Status: DISCONTINUED | OUTPATIENT
Start: 2023-08-10 | End: 2023-08-10 | Stop reason: HOSPADM

## 2023-08-10 RX ORDER — BISACODYL 10 MG
10 SUPPOSITORY, RECTAL RECTAL DAILY PRN
Status: DISCONTINUED | OUTPATIENT
Start: 2023-08-11 | End: 2023-08-12 | Stop reason: HOSPADM

## 2023-08-10 RX ORDER — ACETAMINOPHEN 325 MG/1
650 TABLET ORAL EVERY 4 HOURS PRN
Status: DISCONTINUED | OUTPATIENT
Start: 2023-08-10 | End: 2023-08-10

## 2023-08-10 RX ORDER — IBUPROFEN 800 MG/1
800 TABLET ORAL ONCE AS NEEDED
Status: DISCONTINUED | OUTPATIENT
Start: 2023-08-10 | End: 2023-08-10

## 2023-08-10 RX ORDER — CARBOPROST TROMETHAMINE 250 UG/ML
250 INJECTION, SOLUTION INTRAMUSCULAR
Status: DISCONTINUED | OUTPATIENT
Start: 2023-08-10 | End: 2023-08-12 | Stop reason: HOSPADM

## 2023-08-10 RX ORDER — ONDANSETRON 4 MG/1
4 TABLET, FILM COATED ORAL EVERY 6 HOURS PRN
Status: DISCONTINUED | OUTPATIENT
Start: 2023-08-10 | End: 2023-08-10 | Stop reason: SDUPTHER

## 2023-08-10 RX ORDER — IBUPROFEN 600 MG/1
600 TABLET ORAL EVERY 6 HOURS PRN
Status: DISCONTINUED | OUTPATIENT
Start: 2023-08-10 | End: 2023-08-12 | Stop reason: HOSPADM

## 2023-08-10 RX ORDER — ROPIVACAINE HYDROCHLORIDE 2 MG/ML
14 INJECTION, SOLUTION EPIDURAL; INFILTRATION; PERINEURAL CONTINUOUS
Status: DISCONTINUED | OUTPATIENT
Start: 2023-08-10 | End: 2023-08-10

## 2023-08-10 RX ORDER — MISOPROSTOL 100 UG/1
600 TABLET ORAL ONCE AS NEEDED
Status: DISCONTINUED | OUTPATIENT
Start: 2023-08-10 | End: 2023-08-12 | Stop reason: HOSPADM

## 2023-08-10 RX ORDER — CARBOPROST TROMETHAMINE 250 UG/ML
250 INJECTION, SOLUTION INTRAMUSCULAR AS NEEDED
Status: DISCONTINUED | OUTPATIENT
Start: 2023-08-10 | End: 2023-08-10

## 2023-08-10 RX ORDER — ONDANSETRON 4 MG/1
4 TABLET, FILM COATED ORAL EVERY 6 HOURS PRN
Status: DISCONTINUED | OUTPATIENT
Start: 2023-08-10 | End: 2023-08-12 | Stop reason: HOSPADM

## 2023-08-10 RX ORDER — FENTANYL CITRATE 50 UG/ML
INJECTION, SOLUTION INTRAMUSCULAR; INTRAVENOUS AS NEEDED
Status: DISCONTINUED | OUTPATIENT
Start: 2023-08-10 | End: 2023-08-10 | Stop reason: SURG

## 2023-08-10 RX ORDER — HYDROCORTISONE 25 MG/G
1 CREAM TOPICAL AS NEEDED
Status: DISCONTINUED | OUTPATIENT
Start: 2023-08-10 | End: 2023-08-12 | Stop reason: HOSPADM

## 2023-08-10 RX ORDER — ONDANSETRON 4 MG/1
4 TABLET, FILM COATED ORAL EVERY 6 HOURS PRN
Status: DISCONTINUED | OUTPATIENT
Start: 2023-08-10 | End: 2023-08-10

## 2023-08-10 RX ORDER — LIDOCAINE HYDROCHLORIDE AND EPINEPHRINE 10; 10 MG/ML; UG/ML
INJECTION, SOLUTION INFILTRATION; PERINEURAL AS NEEDED
Status: DISCONTINUED | OUTPATIENT
Start: 2023-08-10 | End: 2023-08-10 | Stop reason: SURG

## 2023-08-10 RX ORDER — MISOPROSTOL 100 UG/1
600 TABLET ORAL AS NEEDED
Status: DISCONTINUED | OUTPATIENT
Start: 2023-08-10 | End: 2023-08-10

## 2023-08-10 RX ORDER — SODIUM CHLORIDE, SODIUM LACTATE, POTASSIUM CHLORIDE, CALCIUM CHLORIDE 600; 310; 30; 20 MG/100ML; MG/100ML; MG/100ML; MG/100ML
125 INJECTION, SOLUTION INTRAVENOUS CONTINUOUS
Status: DISCONTINUED | OUTPATIENT
Start: 2023-08-10 | End: 2023-08-10

## 2023-08-10 RX ORDER — LIDOCAINE HCL/EPINEPHRINE/PF 2%-1:200K
VIAL (ML) INJECTION AS NEEDED
Status: DISCONTINUED | OUTPATIENT
Start: 2023-08-10 | End: 2023-08-10

## 2023-08-10 RX ORDER — ONDANSETRON 2 MG/ML
4 INJECTION INTRAMUSCULAR; INTRAVENOUS EVERY 6 HOURS PRN
Status: DISCONTINUED | OUTPATIENT
Start: 2023-08-10 | End: 2023-08-10

## 2023-08-10 RX ORDER — HYDROXYZINE HYDROCHLORIDE 25 MG/1
50 TABLET, FILM COATED ORAL NIGHTLY PRN
Status: DISCONTINUED | OUTPATIENT
Start: 2023-08-10 | End: 2023-08-12 | Stop reason: HOSPADM

## 2023-08-10 RX ORDER — EPHEDRINE SULFATE 5 MG/ML
10 INJECTION INTRAVENOUS
Status: DISCONTINUED | OUTPATIENT
Start: 2023-08-10 | End: 2023-08-10

## 2023-08-10 RX ORDER — METHYLERGONOVINE MALEATE 0.2 MG/ML
200 INJECTION INTRAVENOUS ONCE AS NEEDED
Status: DISCONTINUED | OUTPATIENT
Start: 2023-08-10 | End: 2023-08-12 | Stop reason: HOSPADM

## 2023-08-10 RX ORDER — METHYLERGONOVINE MALEATE 0.2 MG/ML
200 INJECTION INTRAVENOUS ONCE AS NEEDED
Status: DISCONTINUED | OUTPATIENT
Start: 2023-08-10 | End: 2023-08-10

## 2023-08-10 RX ORDER — LIDOCAINE HYDROCHLORIDE 10 MG/ML
INJECTION, SOLUTION EPIDURAL; INFILTRATION; INTRACAUDAL; PERINEURAL AS NEEDED
Status: DISCONTINUED | OUTPATIENT
Start: 2023-08-10 | End: 2023-08-10 | Stop reason: SURG

## 2023-08-10 RX ORDER — HYDROCODONE BITARTRATE AND ACETAMINOPHEN 5; 325 MG/1; MG/1
1 TABLET ORAL EVERY 4 HOURS PRN
Status: DISCONTINUED | OUTPATIENT
Start: 2023-08-10 | End: 2023-08-10

## 2023-08-10 RX ORDER — PRENATAL VIT/IRON FUM/FOLIC AC 27MG-0.8MG
1 TABLET ORAL DAILY
Status: DISCONTINUED | OUTPATIENT
Start: 2023-08-11 | End: 2023-08-12 | Stop reason: HOSPADM

## 2023-08-10 RX ORDER — HYDROCORTISONE ACETATE PRAMOXINE HCL 1; 1 G/100G; G/100G
1 CREAM TOPICAL AS NEEDED
Status: DISCONTINUED | OUTPATIENT
Start: 2023-08-10 | End: 2023-08-12 | Stop reason: HOSPADM

## 2023-08-10 RX ORDER — LIDOCAINE HYDROCHLORIDE 10 MG/ML
INJECTION, SOLUTION INFILTRATION; PERINEURAL AS NEEDED
Status: DISCONTINUED | OUTPATIENT
Start: 2023-08-10 | End: 2023-08-10 | Stop reason: SURG

## 2023-08-10 RX ORDER — SODIUM CHLORIDE 0.9 % (FLUSH) 0.9 %
1-10 SYRINGE (ML) INJECTION AS NEEDED
Status: DISCONTINUED | OUTPATIENT
Start: 2023-08-10 | End: 2023-08-12 | Stop reason: HOSPADM

## 2023-08-10 RX ORDER — ONDANSETRON 2 MG/ML
4 INJECTION INTRAMUSCULAR; INTRAVENOUS EVERY 6 HOURS PRN
Status: DISCONTINUED | OUTPATIENT
Start: 2023-08-10 | End: 2023-08-12 | Stop reason: HOSPADM

## 2023-08-10 RX ORDER — ACETAMINOPHEN 325 MG/1
650 TABLET ORAL EVERY 6 HOURS PRN
Status: DISCONTINUED | OUTPATIENT
Start: 2023-08-10 | End: 2023-08-12 | Stop reason: HOSPADM

## 2023-08-10 RX ORDER — DOCUSATE SODIUM 100 MG/1
100 CAPSULE, LIQUID FILLED ORAL 2 TIMES DAILY
Status: DISCONTINUED | OUTPATIENT
Start: 2023-08-11 | End: 2023-08-12 | Stop reason: HOSPADM

## 2023-08-10 RX ORDER — HYDROCODONE BITARTRATE AND ACETAMINOPHEN 5; 325 MG/1; MG/1
1 TABLET ORAL EVERY 4 HOURS PRN
Status: DISCONTINUED | OUTPATIENT
Start: 2023-08-10 | End: 2023-08-12 | Stop reason: HOSPADM

## 2023-08-10 RX ADMIN — SODIUM CHLORIDE 5 MILLION UNITS: 9 INJECTION, SOLUTION INTRAVENOUS at 10:43

## 2023-08-10 RX ADMIN — LIDOCAINE HYDROCHLORIDE,EPINEPHRINE BITARTRATE 3 ML: 10; .01 INJECTION, SOLUTION INFILTRATION; PERINEURAL at 10:23

## 2023-08-10 RX ADMIN — PRENATAL VIT W/ FE FUMARATE-FA TAB 27-0.8 MG 1 TABLET: 27-0.8 TAB at 08:12

## 2023-08-10 RX ADMIN — LIDOCAINE HYDROCHLORIDE 3 ML: 10 INJECTION, SOLUTION EPIDURAL; INFILTRATION; INTRACAUDAL; PERINEURAL at 10:21

## 2023-08-10 RX ADMIN — FENTANYL CITRATE 100 MCG: 50 INJECTION INTRAMUSCULAR; INTRAVENOUS at 10:26

## 2023-08-10 RX ADMIN — Medication 2 MILLI-UNITS/MIN: at 10:28

## 2023-08-10 RX ADMIN — LIDOCAINE HYDROCHLORIDE 5 ML: 10 INJECTION, SOLUTION EPIDURAL; INFILTRATION; INTRACAUDAL; PERINEURAL at 10:26

## 2023-08-10 RX ADMIN — ROPIVACAINE HYDROCHLORIDE 14 ML/HR: 2 INJECTION, SOLUTION EPIDURAL; INFILTRATION at 16:57

## 2023-08-10 RX ADMIN — SODIUM CHLORIDE, POTASSIUM CHLORIDE, SODIUM LACTATE AND CALCIUM CHLORIDE 125 ML/HR: 600; 310; 30; 20 INJECTION, SOLUTION INTRAVENOUS at 03:52

## 2023-08-10 RX ADMIN — SODIUM CHLORIDE, POTASSIUM CHLORIDE, SODIUM LACTATE AND CALCIUM CHLORIDE 125 ML/HR: 600; 310; 30; 20 INJECTION, SOLUTION INTRAVENOUS at 13:33

## 2023-08-10 RX ADMIN — SODIUM CHLORIDE 3 MILLION UNITS: 9 INJECTION, SOLUTION INTRAVENOUS at 13:33

## 2023-08-10 RX ADMIN — ROPIVACAINE HYDROCHLORIDE 14 ML: 2 INJECTION, SOLUTION EPIDURAL; INFILTRATION at 10:26

## 2023-08-10 RX ADMIN — SODIUM CHLORIDE, POTASSIUM CHLORIDE, SODIUM LACTATE AND CALCIUM CHLORIDE 1000 ML: 600; 310; 30; 20 INJECTION, SOLUTION INTRAVENOUS at 10:15

## 2023-08-10 RX ADMIN — ACETAMINOPHEN 650 MG: 325 TABLET ORAL at 22:16

## 2023-08-10 NOTE — H&P
Chief complaint   Chief Complaint   Patient presents with    Contractions       History of Present Illness: Patient is a 22 y.o. female who presents with IUP at 37w6d weeks gestation. G 4, P 2103. Contractions.       Past Medical History:   Diagnosis Date    Anxiety     Depression     H/O seasonal allergies     PPH (postpartum hemorrhage)     Psoriasis     Psoriasis     Psoriasis     Urinary tract infection      Blood Type: O   Fetal Gender: Male  GBS: Positive    Past Surgical History:   Procedure Laterality Date    DILATATION AND CURETTAGE N/A 9/1/2017    Procedure: DILATATION AND CURETTAGE;  Surgeon: Juan Pablo Wood MD;  Location:  [facility] OR;  Service:     HEAD/NECK LESION/CYST EXCISION N/A 6/12/2020    Procedure: FACIAL LESION/CYST EXCISION;  Surgeon: Jay Cohen MD;  Location:  [facility] OR;  Service: General;  Laterality: N/A;     Family History   Problem Relation Age of Onset    No Known Problems Mother     Alcohol abuse Father     Diabetes Father     Asthma Maternal Grandmother     Diabetes Paternal Grandmother     Asthma Paternal Grandmother      Social History     Tobacco Use    Smoking status: Former     Packs/day: 0.50     Years: 4.00     Pack years: 2.00     Types: Cigarettes     Passive exposure: Never    Smokeless tobacco: Never    Tobacco comments:     PT STATES IS AROUND SMOKING EVERY DAY   Vaping Use    Vaping Use: Never used   Substance Use Topics    Alcohol use: No    Drug use: No     Medications Prior to Admission   Medication Sig Dispense Refill Last Dose    Prenatal MV & Min w/FA-DHA (Prenatal Adult Gummy/DHA/FA) 0.4-25 MG chewable tablet Chew 1 each Daily.   8/9/2023 at 0900     Allergies:  Patient has no known allergies.      Vital Signs  Temp:  [97.3 øF (36.3 øC)-97.5 øF (36.4 øC)] 97.5 øF (36.4 øC)  Heart Rate:  [83-99] 99  Resp:  [18-24] 18  BP: (118-120)/(61-64) 118/61    Radiology  Imaging Results (Last 24 Hours)       ** No results found for the last 24 hours. **             Labs  Lab Results (last 24 hours)       Procedure Component Value Units Date/Time    CBC & Differential [[id number]]  (Abnormal) Collected: 08/09/23 2149    Specimen: Blood Updated: 08/09/23 2204    Narrative:      The following orders were created for panel order CBC & Differential.  Procedure                               Abnormality         Status                     ---------                               -----------         ------                     CBC Auto Differential[[id number]]        Abnormal            Final result                 Please view results for these tests on the individual orders.    CBC Auto Differential [[id number]]  (Abnormal) Collected: 08/09/23 2149    Specimen: Blood Updated: 08/09/23 2204     WBC 11.99 10*3/mm3      RBC 3.79 10*6/mm3      Hemoglobin 11.1 g/dL      Hematocrit 33.7 %      MCV 88.9 fL      MCH 29.3 pg      MCHC 32.9 g/dL      RDW 14.0 %      RDW-SD 45.0 fl      MPV 10.6 fL      Platelets 173 10*3/mm3      Neutrophil % 68.9 %      Lymphocyte % 24.9 %      Monocyte % 4.2 %      Eosinophil % 1.1 %      Basophil % 0.3 %      Immature Grans % 0.6 %      Neutrophils, Absolute 8.26 10*3/mm3      Lymphocytes, Absolute 2.99 10*3/mm3      Monocytes, Absolute 0.50 10*3/mm3      Eosinophils, Absolute 0.13 10*3/mm3      Basophils, Absolute 0.04 10*3/mm3      Immature Grans, Absolute 0.07 10*3/mm3      nRBC 0.0 /100 WBC     Urine Drug Screen - Urine, Clean Catch [[id number]]  (Normal) Collected: 08/09/23 2006    Specimen: Urine, Clean Catch Updated: 08/09/23 2032     THC, Screen, Urine Negative     Phencyclidine (PCP), Urine Negative     Cocaine Screen, Urine Negative     Methamphetamine, Ur Negative     Opiate Screen Negative     Amphetamine Screen, Urine Negative     Benzodiazepine Screen, Urine Negative     Tricyclic Antidepressants Screen Negative     Methadone Screen, Urine Negative     Barbiturates Screen, Urine Negative     Oxycodone Screen, Urine Negative     Propoxyphene  Screen Negative     Buprenorphine, Screen, Urine Negative    Narrative:      Cutoff For Drugs Screened:    Amphetamines               500 ng/ml  Barbiturates               200 ng/ml  Benzodiazepines            150 ng/ml  Cocaine                    150 ng/ml  Methadone                  200 ng/ml  Opiates                    100 ng/ml  Phencyclidine               25 ng/ml  THC                            50 ng/ml  Methamphetamine            500 ng/ml  Tricyclic Antidepressants  300 ng/ml  Oxycodone                  100 ng/ml  Propoxyphene               300 ng/ml  Buprenorphine               10 ng/ml    The normal value for all drugs tested is negative. This report includes unconfirmed screening results, with the cutoff values listed, to be used for medical treatment purposes only.  Unconfirmed results must not be used for non-medical purposes such as employment or legal testing.  Clinical consideration should be applied to any drug of abuse test, particularly when unconfirmed results are used.      Fentanyl, Urine - Urine, Clean Catch [[id number]]  (Normal) Collected: 08/09/23 2006    Specimen: Urine, Clean Catch Updated: 08/09/23 2030     Fentanyl, Urine Negative    Narrative:      Negative Threshold:      Fentanyl 5 ng/mL     The normal value for the drug tested is negative. This report includes final unconfirmed screening results to be used for medical treatment purposes only. Unconfirmed results must not be used for non-medical purposes such as employment or legal testing. Clinical consideration should be applied to any drug of abuse test, particularly when unconfirmed results are used.           Hepatitis B Surface Antigen [[id number]] Resulted: 05/24/23    Specimen: Blood Updated: 08/09/23 1833     External Hepatitis B Surface Ag Negative    RPR [[id number]] Resulted: 05/24/23    Specimen: Blood Updated: 08/09/23 1833     External RPR Non-Reactive    Rubella Antibody, IgG [[id number]] Resulted: 05/24/23    Specimen:  Blood Updated: 08/09/23 1833     External Rubella Qual Immune    HIV-1 Antibody, EIA [[id number]] Resulted: 05/24/23    Specimen: Blood Updated: 08/09/23 1833     External HIV Antibody Non-Reactive    Group B Streptococcus Culture - Swab, Vaginal/Rectum [[id number]] Resulted: 07/27/23    Specimen: Swab from Vaginal/Rectum Updated: 08/09/23 1833     External Strep Group B Ag POS    Chlamydia trachomatis, Neisseria gonorrhoeae, PCR w/ confirmation - Swab, Vagina [[id number]] Resulted: 08/04/23    Specimen: Swab from Vagina Updated: 08/09/23 1833     External Chlamydia Screen NEG    Gonorrhea Screen - Swab, [[id number]] Resulted: 08/04/23    Specimen: Swab Updated: 08/09/23 1833     External Gonorrhea Screen NEG    GTT 1 Hour [[id number]] Resulted: 06/09/23    Specimen: Blood Updated: 08/09/23 1833     External GTT 1 Hour 145              Review of Systems    The following systems were reviewed and negative;  ENT, respiratory, cardiovascular, gastrointestinal, genitourinary, breast, endocrine and allergies / immunologic.      Physical Exam:      General Appearance:    Alert, cooperative, in no acute distress   Head:    Normocephalic, without obvious abnormality, atraumatic   Eyes:            Lids and lashes normal, conjunctivae and sclerae normal, no   icterus, no pallor, corneas clear, PERRLA   Ears:    Ears appear intact with no abnormalities noted   Throat:   No oral lesions, no thrush, oral mucosa moist   Neck:   No adenopathy, supple, trachea midline, no thyromegaly, no     carotid bruit, no JVD   Back:     No kyphosis present, no scoliosis present, no skin lesions,       erythema or scars, no tenderness to percussion or                   palpation,   range of motion normal   Lungs:     Clear to auscultation,respirations regular, even and                   unlabored    Heart:    Regular rhythm and normal rate, normal S1 and S2, no            murmur, no gallop, no rub, no click   Breast Exam:    Deferred   Abdomen:      Normal bowel sounds, no masses, no organomegaly, soft        non-tender, non-distended, no guarding, no rebound                 tenderness   Genitalia:    Cervix; Dilated 4cm, 70-8-%   Extremities:   Moves all extremities well, no edema, no cyanosis, no              redness   Pulses:   Pulses palpable and equal bilaterally   Skin:   No bleeding, bruising or rash   Lymph nodes:   No palpable adenopathy   Neurologic:   Cranial nerves 2 - 12 grossly intact, sensation intact, DTR        present and equal bilaterally         Assessment: Patient is a 22 y.o. female who presents with IUP at 37w6d weeks gestation. G 4 P 2103  Chief Complaint   Patient presents with    Contractions       Plan of Care: Admit. Proceed with augmentation of labor.      Tu Richard III, MD  08/10/23  07:51 EDT

## 2023-08-10 NOTE — L&D DELIVERY NOTE
Vaginal Delivery Procedure Note    Bianca Nieves  Gestational Age-37.6 weeks        OBGYN: Crystal Hoyos DO      Pre-op Diagnosis:   Pt 21 y/o  @ 37.6 weeks in labor      Anesthesia: Epidural        Detailed Description of Procedure     The patient was prepped and draped in normal sterile fashion. The head was delivered without difficulty. There was a tight nuchal cord x 2. Anterior and posterior shoulders delivered without any problems. The rest of the infant was delivered in controlled fashion.The infant was bulb suctioned at delivery. The placenta delivered intact. The patient tolerated the procedure well and went to the recovery room in stable condition.        Time of delivery: 1725  Maternal Blood Type: O Negative  Fetal Gender: Male  Nuchal Cord: x 2  Tears: None    Blood Cord: Yes  Estimated Blood Loss: 100cc  Placenta: Spontaneous, Delivered Intact   APGARS:  9   9          Disposition: Transfer to Women's Health Floor  Condition: Stable    Crystal Hoyos DO     Date: 8/10/2023  Time: 17:34 EDT

## 2023-08-10 NOTE — ANESTHESIA PREPROCEDURE EVALUATION
Anesthesia Evaluation     Patient summary reviewed and Nursing notes reviewed                Airway   Mallampati: I  TM distance: >3 FB  Neck ROM: full  No difficulty expected  Dental - normal exam     Pulmonary - negative pulmonary ROS and normal exam   Cardiovascular - negative cardio ROS and normal exam    Rhythm: regular  Rate: normal        Neuro/Psych  (+) psychiatric history  GI/Hepatic/Renal/Endo - negative ROS     Musculoskeletal (-) negative ROS    Abdominal  - normal exam    Bowel sounds: normal.   Substance History - negative use     OB/GYN    (+) Pregnant        Other                      Anesthesia Plan    ASA 2     spinal         CODE STATUS:    Level Of Support Discussed With: Patient  Code Status (Patient has no pulse and is not breathing): CPR (Attempt to Resuscitate)  Medical Interventions (Patient has pulse or is breathing): Full Support

## 2023-08-10 NOTE — ANESTHESIA PROCEDURE NOTES
Labor Epidural      Patient location during procedure: OB  Start Time: 8/10/2023 10:19 AM  Stop Time: 8/10/2023 10:23 AM  Indication:at surgeon's request  Performed By  DILLON/CAA: Andrew Lawson CRNA  Preanesthetic Checklist  Completed: patient identified, IV checked, site marked, risks and benefits discussed, surgical consent, monitors and equipment checked, pre-op evaluation and timeout performed  Additional Notes  NAN @ approx 8 cm  Prep:  Pt Position:sitting  Sterile Tech:cap, gloves, mask and sterile barrier  Prep:povidone-iodine 7.5% surgical scrub  Monitoring:blood pressure monitoring and continuous pulse oximetry  Epidural Block Procedure:  Approach:midline  Guidance:landmark technique  Location:L3-L4  Needle Type:Tuohy  Needle Gauge:18 G  Loss of Resistance Medium: saline  Loss of Resistance: 8cm  Cath Depth at skin:14 cm  Paresthesia: none  Aspiration:negative  Test Dose:negative  Number of Attempts: 1  Post Assessment:  Dressing:secured with tape, occlusive dressing applied and biopatch applied  Pt Tolerance:patient tolerated the procedure well with no apparent complications  Complications:no

## 2023-08-10 NOTE — PLAN OF CARE
Problem: Maternal-Fetal Wellbeing  Goal: Optimal Maternal-Fetal Wellbeing  Outcome: Met  Intervention: Support Psychosocial Response to Complications During Pregnancy  Recent Flowsheet Documentation  Taken 8/10/2023 0815 by Bing Pierson, RN  Family/Support System Care: self-care encouraged     Problem: Bleeding (Labor)  Goal: Hemostasis  Outcome: Met     Problem: Change in Fetal Wellbeing (Labor)  Goal: Stable Fetal Wellbeing  Outcome: Met  Intervention: Promote and Monitor Fetal Wellbeing  Recent Flowsheet Documentation  Taken 8/10/2023 0815 by Bing Pierson, RN  Body Position: supine     Problem: Delayed Labor Progression (Labor)  Goal: Effective Progression to Delivery  Outcome: Met     Problem: Infection (Labor)  Goal: Absence of Infection Signs and Symptoms  Outcome: Met  Intervention: Prevent or Manage Infection  Recent Flowsheet Documentation  Taken 8/10/2023 0815 by Bing Pierson, RN  Infection Prevention: hand hygiene promoted     Problem: Labor Pain (Labor)  Goal: Acceptable Pain Control  Outcome: Met     Problem: Uterine Tachysystole (Labor)  Goal: Normal Uterine Contraction Pattern  Outcome: Met   Goal Outcome Evaluation:

## 2023-08-11 LAB
ABO GROUP BLD: NORMAL
BASOPHILS # BLD AUTO: 0.03 10*3/MM3 (ref 0–0.2)
BASOPHILS NFR BLD AUTO: 0.3 % (ref 0–1.5)
DEPRECATED RDW RBC AUTO: 47.7 FL (ref 37–54)
EOSINOPHIL # BLD AUTO: 0.12 10*3/MM3 (ref 0–0.4)
EOSINOPHIL NFR BLD AUTO: 1.2 % (ref 0.3–6.2)
ERYTHROCYTE [DISTWIDTH] IN BLOOD BY AUTOMATED COUNT: 14.1 % (ref 12.3–15.4)
FETAL BLEED: NEGATIVE
HCT VFR BLD AUTO: 31.7 % (ref 34–46.6)
HGB BLD-MCNC: 10.2 G/DL (ref 12–15.9)
IMM GRANULOCYTES # BLD AUTO: 0.05 10*3/MM3 (ref 0–0.05)
IMM GRANULOCYTES NFR BLD AUTO: 0.5 % (ref 0–0.5)
LYMPHOCYTES # BLD AUTO: 2.76 10*3/MM3 (ref 0.7–3.1)
LYMPHOCYTES NFR BLD AUTO: 26.6 % (ref 19.6–45.3)
MCH RBC QN AUTO: 29.5 PG (ref 26.6–33)
MCHC RBC AUTO-ENTMCNC: 32.2 G/DL (ref 31.5–35.7)
MCV RBC AUTO: 91.6 FL (ref 79–97)
MONOCYTES # BLD AUTO: 0.48 10*3/MM3 (ref 0.1–0.9)
MONOCYTES NFR BLD AUTO: 4.6 % (ref 5–12)
NEUTROPHILS NFR BLD AUTO: 6.95 10*3/MM3 (ref 1.7–7)
NEUTROPHILS NFR BLD AUTO: 66.8 % (ref 42.7–76)
NRBC BLD AUTO-RTO: 0 /100 WBC (ref 0–0.2)
NUMBER OF DOSES: ABNORMAL
PLATELET # BLD AUTO: 139 10*3/MM3 (ref 140–450)
PMV BLD AUTO: 10.9 FL (ref 6–12)
RBC # BLD AUTO: 3.46 10*6/MM3 (ref 3.77–5.28)
RH BLD: NEGATIVE
WBC NRBC COR # BLD: 10.39 10*3/MM3 (ref 3.4–10.8)

## 2023-08-11 PROCEDURE — 85025 COMPLETE CBC W/AUTO DIFF WBC: CPT | Performed by: OBSTETRICS & GYNECOLOGY

## 2023-08-11 PROCEDURE — 86900 BLOOD TYPING SEROLOGIC ABO: CPT | Performed by: OBSTETRICS & GYNECOLOGY

## 2023-08-11 PROCEDURE — 85461 HEMOGLOBIN FETAL: CPT | Performed by: OBSTETRICS & GYNECOLOGY

## 2023-08-11 PROCEDURE — 25010000002 RHO D IMMUNE GLOBULIN 1500 UNIT/2ML SOLUTION PREFILLED SYRINGE: Performed by: OBSTETRICS & GYNECOLOGY

## 2023-08-11 PROCEDURE — 86901 BLOOD TYPING SEROLOGIC RH(D): CPT | Performed by: OBSTETRICS & GYNECOLOGY

## 2023-08-11 RX ORDER — HYDROXYZINE HYDROCHLORIDE 25 MG/1
25 TABLET, FILM COATED ORAL 3 TIMES DAILY PRN
Status: DISCONTINUED | OUTPATIENT
Start: 2023-08-11 | End: 2023-08-12 | Stop reason: HOSPADM

## 2023-08-11 RX ADMIN — IBUPROFEN 600 MG: 600 TABLET, FILM COATED ORAL at 01:35

## 2023-08-11 RX ADMIN — PRENATAL VIT W/ FE FUMARATE-FA TAB 27-0.8 MG 1 TABLET: 27-0.8 TAB at 08:11

## 2023-08-11 RX ADMIN — IBUPROFEN 600 MG: 600 TABLET, FILM COATED ORAL at 16:22

## 2023-08-11 RX ADMIN — DOCUSATE SODIUM 100 MG: 100 CAPSULE, LIQUID FILLED ORAL at 08:11

## 2023-08-11 RX ADMIN — HUMAN RHO(D) IMMUNE GLOBULIN 1500 UNITS: 1500 SOLUTION INTRAMUSCULAR; INTRAVENOUS at 09:34

## 2023-08-11 RX ADMIN — HYDROXYZINE HYDROCHLORIDE 25 MG: 25 TABLET, FILM COATED ORAL at 18:29

## 2023-08-11 RX ADMIN — ACETAMINOPHEN 650 MG: 325 TABLET ORAL at 13:27

## 2023-08-11 RX ADMIN — DOCUSATE SODIUM 100 MG: 100 CAPSULE, LIQUID FILLED ORAL at 20:49

## 2023-08-11 NOTE — PLAN OF CARE
Problem: Adult Inpatient Plan of Care  Goal: Plan of Care Review  Outcome: Ongoing, Progressing  Flowsheets (Taken 8/11/2023 1841)  Progress: improving  Plan of Care Reviewed With: patient  Outcome Evaluation: vss. fundus firm with light bleeding, voiding without difficulty. pain managed with tylenol, motrin this shift. pt stated that she was feeling anxious and requested medicine, talked with dr bosch and atarax has been ordered for pt prn. will continue to monitor.  Goal: Patient-Specific Goal (Individualized)  Outcome: Ongoing, Progressing  Goal: Absence of Hospital-Acquired Illness or Injury  Outcome: Ongoing, Progressing  Intervention: Identify and Manage Fall Risk  Recent Flowsheet Documentation  Taken 8/11/2023 0800 by Prema Hughes, RN  Safety Promotion/Fall Prevention: safety round/check completed  Intervention: Prevent and Manage VTE (Venous Thromboembolism) Risk  Recent Flowsheet Documentation  Taken 8/11/2023 0800 by Prema Hughes RN  Activity Management: up ad sury  Goal: Optimal Comfort and Wellbeing  Outcome: Ongoing, Progressing  Intervention: Monitor Pain and Promote Comfort  Recent Flowsheet Documentation  Taken 8/11/2023 1622 by Prema Hughes RN  Pain Management Interventions: see MAR  Taken 8/11/2023 1327 by Prema Hughes, RN  Pain Management Interventions: see MAR  Intervention: Provide Person-Centered Care  Recent Flowsheet Documentation  Taken 8/11/2023 0800 by Prema Hughes RN  Trust Relationship/Rapport:   care explained   choices provided   emotional support provided   empathic listening provided   questions answered   reassurance provided   questions encouraged   thoughts/feelings acknowledged  Goal: Readiness for Transition of Care  Outcome: Ongoing, Progressing   Goal Outcome Evaluation:  Plan of Care Reviewed With: patient        Progress: improving  Outcome Evaluation: vss. fundus firm with light bleeding, voiding without difficulty. pain managed with  tylenol, motrin this shift. pt stated that she was feeling anxious and requested medicine, talked with dr bosch and atarax has been ordered for pt prn. will continue to monitor.

## 2023-08-11 NOTE — PROGRESS NOTES
" [facility]  Vaginal Delivery Progress Note    Subjective   Subjective  Postpartum Day 1: Vaginal Delivery    The patient feels well.  Her pain is well controlled with prescribed pain medications.   She is ambulating well.  Patient describes her bleeding as thin lochia.    Breastfeeding: declines.    Objective     Objective:  Vital signs (most recent): Blood pressure 109/60, pulse 64, temperature 97.8 øF (36.6 øC), temperature source Oral, resp. rate 18, height 165.1 cm (65\"), weight 90.9 kg (200 lb 8 oz), SpO2 99 %, not currently breastfeeding.   Vital Signs Range for the last 24 hours  Temperature: Temp:  [97 øF (36.1 øC)-98.3 øF (36.8 øC)] 97.8 øF (36.6 øC)   Temp Source: Temp src: Oral   BP: BP: ()/(46-78) 109/60   Pulse: Heart Rate:  [] 64   Respirations: Resp:  [18] 18   Weight:       Admit Height:  Height: 165.1 cm (65\")    Physical Exam:  General:  no acute distress.  Abdomen: Fundus: appropriate, firm, non tender.  Extremities: normal, atraumatic, no cyanosis, and trace edema.       [unfilled]       Lab Results   Component Value Date    ABO O 08/11/2023    RH Negative 08/11/2023        Lab Results   Component Value Date    HGB 10.2 (L) 08/11/2023    HCT 31.7 (L) 08/11/2023          Assessment:  Assessment & Plan    Uterine contractions    Pregnancy      Bianca Nieves is Day 1 post-partum    Vaginal, Spontaneous         .      Plan:    Continue current care Rh negative: needs Rh Immunoglobulin.      Yoav Fierro, APRN  8/11/2023  09:38 EDT  "

## 2023-08-11 NOTE — PLAN OF CARE
Goal Outcome Evaluation:  Plan of Care Reviewed With: patient           Outcome Evaluation: Patient ambulating independently. Fundus is firm without massage. Lochia is light with no clots noted. Tolerating regular diet. Voids without difficulty. PRN medication administered x2 for pain during this shift.

## 2023-08-12 VITALS
SYSTOLIC BLOOD PRESSURE: 118 MMHG | HEART RATE: 68 BPM | OXYGEN SATURATION: 98 % | DIASTOLIC BLOOD PRESSURE: 69 MMHG | WEIGHT: 200.5 LBS | HEIGHT: 65 IN | RESPIRATION RATE: 18 BRPM | BODY MASS INDEX: 33.41 KG/M2 | TEMPERATURE: 98.1 F

## 2023-08-12 RX ORDER — ONDANSETRON 4 MG/1
4 TABLET, FILM COATED ORAL EVERY 6 HOURS PRN
Status: DISCONTINUED | OUTPATIENT
Start: 2023-08-12 | End: 2023-08-12 | Stop reason: HOSPADM

## 2023-08-12 RX ORDER — ACETAMINOPHEN 325 MG/1
650 TABLET ORAL EVERY 6 HOURS PRN
Status: DISCONTINUED | OUTPATIENT
Start: 2023-08-12 | End: 2023-08-12 | Stop reason: HOSPADM

## 2023-08-12 RX ORDER — SODIUM CHLORIDE 0.9 % (FLUSH) 0.9 %
1-10 SYRINGE (ML) INJECTION AS NEEDED
Status: DISCONTINUED | OUTPATIENT
Start: 2023-08-12 | End: 2023-08-12 | Stop reason: HOSPADM

## 2023-08-12 RX ORDER — PRENATAL VIT/IRON FUM/FOLIC AC 27MG-0.8MG
1 TABLET ORAL DAILY
Status: DISCONTINUED | OUTPATIENT
Start: 2023-08-13 | End: 2023-08-12 | Stop reason: HOSPADM

## 2023-08-12 RX ORDER — MISOPROSTOL 100 UG/1
600 TABLET ORAL ONCE AS NEEDED
Status: DISCONTINUED | OUTPATIENT
Start: 2023-08-12 | End: 2023-08-12 | Stop reason: HOSPADM

## 2023-08-12 RX ORDER — CARBOPROST TROMETHAMINE 250 UG/ML
250 INJECTION, SOLUTION INTRAMUSCULAR
Status: DISCONTINUED | OUTPATIENT
Start: 2023-08-12 | End: 2023-08-12 | Stop reason: HOSPADM

## 2023-08-12 RX ORDER — HYDROCORTISONE 25 MG/G
1 CREAM TOPICAL AS NEEDED
Status: DISCONTINUED | OUTPATIENT
Start: 2023-08-12 | End: 2023-08-12 | Stop reason: HOSPADM

## 2023-08-12 RX ORDER — HYDROCODONE BITARTRATE AND ACETAMINOPHEN 10; 325 MG/1; MG/1
1 TABLET ORAL EVERY 4 HOURS PRN
Status: DISCONTINUED | OUTPATIENT
Start: 2023-08-12 | End: 2023-08-12 | Stop reason: HOSPADM

## 2023-08-12 RX ORDER — IBUPROFEN 600 MG/1
600 TABLET ORAL EVERY 6 HOURS PRN
Status: DISCONTINUED | OUTPATIENT
Start: 2023-08-12 | End: 2023-08-12 | Stop reason: HOSPADM

## 2023-08-12 RX ORDER — METHYLERGONOVINE MALEATE 0.2 MG/ML
200 INJECTION INTRAVENOUS ONCE AS NEEDED
Status: DISCONTINUED | OUTPATIENT
Start: 2023-08-12 | End: 2023-08-12 | Stop reason: HOSPADM

## 2023-08-12 RX ORDER — BISACODYL 10 MG
10 SUPPOSITORY, RECTAL RECTAL DAILY PRN
Status: DISCONTINUED | OUTPATIENT
Start: 2023-08-13 | End: 2023-08-12 | Stop reason: HOSPADM

## 2023-08-12 RX ORDER — HYDROXYZINE HYDROCHLORIDE 25 MG/1
50 TABLET, FILM COATED ORAL NIGHTLY PRN
Status: DISCONTINUED | OUTPATIENT
Start: 2023-08-12 | End: 2023-08-12 | Stop reason: HOSPADM

## 2023-08-12 RX ORDER — HYDROCODONE BITARTRATE AND ACETAMINOPHEN 5; 325 MG/1; MG/1
1 TABLET ORAL EVERY 4 HOURS PRN
Status: DISCONTINUED | OUTPATIENT
Start: 2023-08-12 | End: 2023-08-12 | Stop reason: HOSPADM

## 2023-08-12 RX ORDER — HYDROCORTISONE ACETATE PRAMOXINE HCL 1; 1 G/100G; G/100G
1 CREAM TOPICAL AS NEEDED
Status: DISCONTINUED | OUTPATIENT
Start: 2023-08-12 | End: 2023-08-12 | Stop reason: HOSPADM

## 2023-08-12 RX ORDER — DOCUSATE SODIUM 100 MG/1
100 CAPSULE, LIQUID FILLED ORAL 2 TIMES DAILY
Status: DISCONTINUED | OUTPATIENT
Start: 2023-08-13 | End: 2023-08-12 | Stop reason: HOSPADM

## 2023-08-12 RX ORDER — IBUPROFEN 600 MG/1
600 TABLET ORAL EVERY 6 HOURS PRN
Qty: 60 TABLET | Refills: 0 | Status: SHIPPED | OUTPATIENT
Start: 2023-08-12

## 2023-08-12 RX ORDER — ONDANSETRON 2 MG/ML
4 INJECTION INTRAMUSCULAR; INTRAVENOUS EVERY 6 HOURS PRN
Status: DISCONTINUED | OUTPATIENT
Start: 2023-08-12 | End: 2023-08-12 | Stop reason: HOSPADM

## 2023-08-12 RX ADMIN — DOCUSATE SODIUM 100 MG: 100 CAPSULE, LIQUID FILLED ORAL at 08:43

## 2023-08-12 RX ADMIN — PRENATAL VIT W/ FE FUMARATE-FA TAB 27-0.8 MG 1 TABLET: 27-0.8 TAB at 08:43

## 2023-08-12 NOTE — PLAN OF CARE
Goal Outcome Evaluation:  Plan of Care Reviewed With: patient           Outcome Evaluation: Ambulating independently. Tolerating regular diet. Voids without difficulty. Fundus is firm without massage. Light lochia with no clots noted. Vital signs stable.

## 2023-08-12 NOTE — CASE MANAGEMENT/SOCIAL WORK
Case Management/Social Work    Patient Name:  Bianca Nieves  YOB: 2000  MRN: [id number]  Admit Date:  8/9/2023      SS received call on-call for a Mothers EPDS score of 12. SS spoke with RN who states pt has been discharged. SS attempted multiple times to contact Mother via phone and was unsuccessful. SS was not able to leave voicemail. SS then contacted pt mother Crystal who states she would get in touch with pt and have her contact SS back. SS notified RN.      16:04: SS received call from Mother who states she had been seeing a provider monthly at the [facility] before and plans to follow up with them again. SS encourage Mental Health counseling and made mother aware [facility] offers Mental Health counseling as well. Mother voiced understanding.         Electronically signed by:  GAVIN Christina  08/12/23 15:47 EDT

## 2023-08-12 NOTE — DISCHARGE INSTR - APPOINTMENTS
Please call [facility] on the following business day to schedule your postpartum appointment. You will need to be seen in 3 weeks.

## 2023-08-12 NOTE — PLAN OF CARE
Goal Outcome Evaluation:  Plan of Care Reviewed With: patient        Discharge instructions given and explained. ER warning signs discussed. Questions answered. Pt verbalized understanding.

## 2023-08-12 NOTE — DISCHARGE SUMMARY
JOSE Abarca  Delivery Discharge Summary    Primary OB Clinician:     EDC: Estimated Date of Delivery: 23    Gestational Age:37w6d    Antepartum complications: none    Date of Delivery: 8/10/2023   Time of Delivery: 5:25 PM     Delivered By:  Crystal Hoyos     Delivery Type: Vaginal, Spontaneous      Tubal Ligation: n/a    Baby:male  infant;   Apgar:  9  @ 1 minute /   Apgar:  9  @ 5 minutes   Weight: 2830 g (6 lb 3.8 oz)      Anesthesia: Epidural      Intrapartum complications: None    [unfilled]       Lab Results   Component Value Date    ABO O 2023    RH Negative 2023        Lab Results   Component Value Date    HGB 10.2 (L) 2023    HCT 31.7 (L) 2023         Discharge Date: 2023; Discharge Time: 09:37 EDT        Plan:    Follow-up appointment with [facility] in 3 weeks.      Crystal Hoyos DO  2023  09:36 EDT

## 2023-10-22 ENCOUNTER — PREP FOR SURGERY (OUTPATIENT)
Dept: OTHER | Facility: HOSPITAL | Age: 23
End: 2023-10-22
Payer: COMMERCIAL

## 2023-10-22 DIAGNOSIS — Z30.2 ENCOUNTER FOR FEMALE STERILIZATION PROCEDURE: Primary | ICD-10-CM

## 2023-10-22 RX ORDER — SODIUM CHLORIDE 0.9 % (FLUSH) 0.9 %
10 SYRINGE (ML) INJECTION EVERY 12 HOURS SCHEDULED
OUTPATIENT
Start: 2023-10-22

## 2023-10-22 RX ORDER — SODIUM CHLORIDE 0.9 % (FLUSH) 0.9 %
10 SYRINGE (ML) INJECTION AS NEEDED
OUTPATIENT
Start: 2023-10-22

## 2023-10-22 RX ORDER — SODIUM CHLORIDE 9 MG/ML
40 INJECTION, SOLUTION INTRAVENOUS AS NEEDED
OUTPATIENT
Start: 2023-10-22

## 2023-10-23 NOTE — DISCHARGE INSTRUCTIONS
HOLD all diabetic medications the morning of surgery as ordered by physician.    Please discontinue all blood thinners and anticoagulants (except aspirin) prior to surgery as per your surgeon and cardiologist instructions.  Aspirin may be continued up to the day prior to surgery.     CHLORHEXIDINE CLOTHS GIVEN WITH INSTRUCTIONS AND FORM TO RETURN TO HOSPITAL, IF APPLICABLE    10/26/23   0630 AM  ARRIVAL TIME per Dr. Hoyos's office.    General Instructions:  Do not eat or drink after midnight:10/25/23 includes water, mints, or gum. You may brush your teeth.  Dental appliances that are removable must be taken out day of surgery.  Do not smoke, chew tobacco, or drink alcohol 24 hours prior to surgery.  Bring medications in original bottles, any inhalers and if applicable your C-PAP/BI-PAP machine.  Bring any papers given to you in the doctor's office.  Wear clean comfortable clothes and socks.  Do not wear contact lenses or make-up. Bring a case for your glasses if applicable.  Bring crutches or walker if applicable.  Leave all other valuables and jewelry at home.    If you were given a blood bank ID arm band remember to bring it with you the day of surgery.    Preventing a Surgical Site Infection:  Shower the night before surgery (unless instructed other wise) using a fresh bar of anti-bacterial soap (such as Dial) and clean washcloth. Dry with a clean towel and dress in clean clothing.  For 2 to 3 days before surgery, avoid shaving with a razor near where you will have surgery because the razor can irritate skin and make it easier to develop an infection. Ask your surgeon if you will be receiving antibiotics prior to surgery.  Make sure you, your family, and all healthcare providers clear their hands with soap and water or an alcohol-based hand  before caring for you or your wound.  If at all possible, quit smoking as many days before surgery as you can.    Day of surgery:  Upon arrival, a Pre-op nurse  and Anesthesiologist will review your health history, obtain vital signs, and answer questions you may have. The only belongings needed at this time will be your home medications and if applicable your C-PAP/BI-PAP machine. If you are staying overnight your family can leave the rest of your belongings in the car and bring them to your room later. A Pre-op nurse will start an IV and you may receive medication in preparation for surgery, including something to help you relax. Your family will be able to see you in the Pre-op area. While you are in surgery your family should notify the waiting room  if they leave the waiting room area and provide a contact phone number.    Please be aware that surgery does come with discomfort. We want to make every effort to control your discomfort so please discuss any uncontrolled symptoms with your nurse. Your doctor will most likely have prescribed pain medications.    If you are going home after surgery you will receive individualized written care instructions before being discharged. A responsible adult must drive you to and from the hospital on the day of surgery and stay with you for 24 hours.    If you are staying overnight following surgery, you will be transported to your hospital room following the recovery period.  [facility].    If you have any questions please call Pre-Admission Testing at [phone].  Deductibles and co-payments are collected on the day of service. Please be prepared to pay the required co-pay, deductible or deposit on the day of service as defined by your plan.    A RESPONSIBLE PERSON MUST REMAIN IN THE WAITING ROOM DURING YOUR PROCEDURE AND A RESPONSIBLE  MUST BE AVAILABLE UPON YOUR DISCHARGE.

## 2023-10-24 ENCOUNTER — PRE-ADMISSION TESTING (OUTPATIENT)
Dept: PREADMISSION TESTING | Facility: HOSPITAL | Age: 23
End: 2023-10-24
Payer: COMMERCIAL

## 2023-10-24 DIAGNOSIS — Z30.2 ENCOUNTER FOR FEMALE STERILIZATION PROCEDURE: ICD-10-CM

## 2023-10-24 LAB
ABO GROUP BLD: NORMAL
BASOPHILS # BLD AUTO: 0.04 10*3/MM3 (ref 0–0.2)
BASOPHILS NFR BLD AUTO: 0.5 % (ref 0–1.5)
BLD GP AB SCN SERPL QL: POSITIVE
DEPRECATED RDW RBC AUTO: 39.5 FL (ref 37–54)
EOSINOPHIL # BLD AUTO: 0.18 10*3/MM3 (ref 0–0.4)
EOSINOPHIL NFR BLD AUTO: 2.1 % (ref 0.3–6.2)
ERYTHROCYTE [DISTWIDTH] IN BLOOD BY AUTOMATED COUNT: 12.8 % (ref 12.3–15.4)
HCG SERPL QL: NEGATIVE
HCT VFR BLD AUTO: 38.9 % (ref 34–46.6)
HGB BLD-MCNC: 12.9 G/DL (ref 12–15.9)
IMM GRANULOCYTES # BLD AUTO: 0.03 10*3/MM3 (ref 0–0.05)
IMM GRANULOCYTES NFR BLD AUTO: 0.4 % (ref 0–0.5)
LYMPHOCYTES # BLD AUTO: 2.74 10*3/MM3 (ref 0.7–3.1)
LYMPHOCYTES NFR BLD AUTO: 32.6 % (ref 19.6–45.3)
MCH RBC QN AUTO: 28.3 PG (ref 26.6–33)
MCHC RBC AUTO-ENTMCNC: 33.2 G/DL (ref 31.5–35.7)
MCV RBC AUTO: 85.3 FL (ref 79–97)
MONOCYTES # BLD AUTO: 0.33 10*3/MM3 (ref 0.1–0.9)
MONOCYTES NFR BLD AUTO: 3.9 % (ref 5–12)
NEUTROPHILS NFR BLD AUTO: 5.08 10*3/MM3 (ref 1.7–7)
NEUTROPHILS NFR BLD AUTO: 60.5 % (ref 42.7–76)
NRBC BLD AUTO-RTO: 0 /100 WBC (ref 0–0.2)
PLATELET # BLD AUTO: 187 10*3/MM3 (ref 140–450)
PMV BLD AUTO: 11.3 FL (ref 6–12)
RBC # BLD AUTO: 4.56 10*6/MM3 (ref 3.77–5.28)
RESIDUAL RHIG DETECTED: NORMAL
RH BLD: NEGATIVE
T&S EXPIRATION DATE: NORMAL
WBC NRBC COR # BLD: 8.4 10*3/MM3 (ref 3.4–10.8)

## 2023-10-24 PROCEDURE — 86870 RBC ANTIBODY IDENTIFICATION: CPT

## 2023-10-24 PROCEDURE — 86850 RBC ANTIBODY SCREEN: CPT

## 2023-10-24 PROCEDURE — 86900 BLOOD TYPING SEROLOGIC ABO: CPT

## 2023-10-24 PROCEDURE — 84703 CHORIONIC GONADOTROPIN ASSAY: CPT

## 2023-10-24 PROCEDURE — 86901 BLOOD TYPING SEROLOGIC RH(D): CPT

## 2023-10-24 PROCEDURE — 85025 COMPLETE CBC W/AUTO DIFF WBC: CPT

## 2023-10-24 PROCEDURE — 36415 COLL VENOUS BLD VENIPUNCTURE: CPT

## 2023-10-25 NOTE — H&P
This 23 year old patient presents for Pre-OP.      History of Present Illness:  1.  Pre-OP   Additional information: Pt 24 y/o  for preop, denies any c/o, strongly desires sterilization.                Screening Tools  Other Screenings  Encounter Date Performed Date Screening Tool Score Severity/ Interpretation MDD Classification Scanned Document   10/24/2023 10/24/2023 Patient Health Questionnaire (PHQ-2) 0 Negative         Gynecologic History  10/24/2023 11:50 AM  Date of last Pap: 2022.    Obstetric History  Not currently pregnant.   Pregnancy # Birth order Date Delivery Type GA(wks) Labor(hrs) Weight Sex   1  2017  36w6d  5 lb(s) 9 oz Female   2  2019  39w0d  6 lb(s) 3 oz Male   3  2022  38w0d   Male   4  2023  37w6d  6 lb(s) 3.8 oz Male   Past Systemic History    Medical History  (Reviewed,updated)  Disease Onset Date Comments   Hx of depression and anxiety 2018    Urinary tract infection, recurrent 2018    Hemorrhaged after delivery 2017    PSORIASIS 2014    Allergies, seasonal since childhood         Surgical History/Management  (Reviewed,updated)  Management Date Comments   Cyst removal between eyes 2019      Diagnostics History  Status Study Ordered Completed Interpretation  Result / Report   ordered EKG 2022       ordered FETAL BIOPHYS PROFIL W/O NST 2019       result received FETAL BIOPHYS PROFIL W/O NST 2022 See module     completed FETAL NON-STRESS TEST 2023   Reactive  FHT- 135   result received OB US < 14 WKS, SINGLE FETUS 2022 See module     completed OB US >/= 14 WKS, SNGL FETUS 2017      completed OB US >/= 14 WKS, SNGL FETUS 2019      completed OB US >/= 14 WKS, SNGL FETUS 2022 See module     result received OB US >/= 14 WKS, SNGL FETUS 2023 See module     completed OB US, FOLLOW-UP, PER FETUS 2017       completed OB US, FOLLOW-UP, PER FETUS 2019      completed OB US, FOLLOW-UP, PER FETUS 2019      result received OB US, FOLLOW-UP, PER FETUS 2022 See module     result received OB US, FOLLOW-UP, PER FETUS 2022 See module     result received OB US, FOLLOW-UP, PER FETUS 2023 See module     completed OB US, LIMITED, FETUS(S) 01/10/2019 01/10/2019      ordered PPD 0.1 mL ID 2019       ordered TRANSVAGINAL US, OBSTETRIC 2017         Pap Result, HPV Detail and Treatment Performed  Done Pap and HPV/Treatment Pap Results HPV Details Treatment Results Comments   2022 Pap Performed See module          Problem List  No active problems      Medications (active prior to today)  Medication Name Sig Description Start Date Stop Date Refilled Rx Elsewhere   Procardia XL 30 mg tablet,extended release take 1 tablet by oral route  every day // 10/24/2023  Y   sertraline 25 mg tablet take 1 tablet by oral route  every day 2023 10/24/2023  N     Patient Status   Completed with information received for patient transitioning into care.     Medication Reconciliation  Medications reconciled today.  Patient is on no medications.      Allergies  Ingredient Reaction (Severity) Medication Name Comment   NO KNOWN ALLERGIES            Family History    (Reviewed, updated)  Relationship Family Member Name  Age at Death Condition Onset Age Cause of Death   Father    alcoholism  N   Father    Diabetes mellitus  N   Maternal grandmother    asthma  N   Maternal uncle    Intellectual disability  N   Paternal aunt    Diabetes mellitus  N   Paternal grandmother    Diabetes mellitus  N   Paternal grandmother    asthma  N   M    Social History  (Reviewed, updated)  Tobacco use reviewed.    Preferred language is English.      Marital Status/Family/Social Support  Marital status: Single     Tobacco use status: Light cigarette smoker (1-9  cigs/day).    Smoking status: Light tobacco smoker.    Tobacco Screening  Patient has used tobacco. Patient has used tobacco in the last 30 days. Patient has not used smokeless tobacco in the last 30 days.    Smoking Status  Type Smoking Status Usage Per Day Years Used Pack Years Total Pack Years   Cigarette Light tobacco smoker 1 Cigarettes          Vaping Use  Screened for vaping? Yes  Status: Not a current user  Vaping cessation discussed: No    Tobacco/Vaping Exposure  No passive vaping exposure.  There is passive smoke exposure.    Comments: passive smoke exposure in the home    Comments: Pt states she quit smoking about a month ago 2/8/17 lr    Alcohol  There is no history of alcohol use.     Caffeine  The patient uses caffeine: coffee - 1 cup a day.    Lifestyle  Diet  healthy.  Rastafari/Spiritual  Patient agrees to transfusion.     Home Environment/Safety  Uses seat belts.       Review of Systems  Review of Systems  System Neg/Pos Details   Constitutional Negative Chills, Fatigue, Fever, Malaise, Night sweats, Weight gain and Weight loss.   Respiratory Negative Chronic cough, Cough, Dyspnea, Known TB exposure and Wheezing.   Cardio Negative Chest pain, Claudication, Edema and Irregular heartbeat/palpitations.   GI Negative Abdominal pain, Blood in stool, Change in stool pattern, Constipation, Decreased appetite, Diarrhea, Heartburn, Nausea and Vomiting.    Negative Dysuria, Hematuria, Polyuria (Genitourinary), Urinary frequency, Urinary incontinence and Urinary retention.   Endocrine Negative Cold intolerance, Heat intolerance, Polydipsia and Polyphagia.   Neuro Negative Dizziness, Extremity weakness, Gait disturbance, Headache, Memory impairment, Numbness in extremity, Seizures and Tremors.   Integumentary Negative Brittle hair, Brittle nails, Change in shape/size of mole(s), Hair loss, Hirsutism, Hives, Pruritus, Rash and Skin lesion.   MS Negative Back pain, Joint pain, Joint swelling, Muscle weakness  and Neck pain.   Allergic/Immuno Negative Contact allergy, Environmental allergies, Food allergies and Seasonal allergies.   Reproductive Negative Breast discharge and Breast lumps.     Vital Signs     Time BP mm/Hg Pulse /min Resp /min Temp F Ht ft Ht in Ht cm Wt lb Wt kg BMI kg/m2 BSA m2 O2 Sat%   12:06 /60 70 18 98.1 5 5 165.1 178 80.739 29.62 1.92 98     Measured By  Time Measured by   12:06 PM Zoraida Pal       Screening Summary  The following were reviewed: tobacco use, alcohol use, caffeine use, drugs of abuse and date of last pap        Physical Exam  Exam Findings Details   Constitutional Normal Well developed.   Neck Exam Normal Palpation - Normal. Thyroid gland - Normal.   Respiratory Normal Inspection - Normal. Auscultation - Normal. Effort - Normal.   Cardiovascular Normal Rhythm - Regular. Extra sounds - None. Murmurs - None.   Abdomen Normal Anterior palpation -  No rebound. No abdominal tenderness. No hepatic enlargement. No hernia.   Genitourinary * Pelvic deferred. Rectal deferred.   Skin Normal Inspection - Normal.   Extremity Normal No edema.   Psychiatric Normal Orientation - Oriented to time, place, person & situation. Appropriate mood and affect.       Completed Orders (This Visit)  Order Details Reason Side Interpretation Result Additional Info Initial Treatment Date Region   Tobacco cessation counseling      Smoking cessation education     Prescribed activity/exercise education           Dietary management education, guidance, and counseling           Patient Health Questionnaire (PHQ-2)    Negative 0      Pre-Visit Planning             Assessment/Plan  # Detail Type Description    1. Assessment Preoperative examination (Z01.818).    Provider Plan Pt for lap B/L salpingectomy in 2 days. R/B/A discussed in detail, pt still strongly desires surgery.         2. Assessment Dietary counseling and surveillance (Z71.3).    Plan Orders Today's instructions / counseling include(s) Dietary  management education, guidance, and counseling and Prescribed activity/exercise education .         3. Other Orders Orders not associated to today's assessments.    Plan Orders The patient had the following order(s) completed today: Patient Health Questionnaire (PHQ-2). Interpretation: Negative, Result details: 0. Today's instructions / counseling include(s) Tobacco cessation counseling.            Diagnostic History Entered Today  Performed Study Interpretation Result   10/24/2023 Pre-Visit Planning       Clinical Guidelines (Reviewed, updated)  Guidelines Status Due Action Last Addressed Comments   BMI completed 01/01/2024 Completed on 10/24/2023 10/24/2023    BMI Adult Follow-Up completed 01/01/2024 Completed on 10/24/2023 10/24/2023    Depression screening completed 10/24/2024 Completed on 10/24/2023 10/24/2023    Hepatitis C screening completed  Completed on 05/25/2023 05/25/2023    HIV Screen completed  Completed on 05/25/2023 05/25/2023    PAP completed 03/03/2025 Completed on 03/03/2022 03/03/2022    Pap/HPV testing completed 09/03/2028 Completed on 12/19/2018 12/19/2018    Pre-Visit Planning completed 10/31/2023 Completed on 10/24/2023 10/24/2023    Tdap completed  Completed on 06/09/2023 06/09/2023    Tobacco screening completed 10/24/2024 Completed on 10/24/2023 10/24/2023      Clinical Guidelines (Declined or Excluded)  Guideline Status Due Action Last Addressed Comments   Influenza Vaccine Declined 09/06/2023 refused     Pap-Liquid-based Declined 09/06/2023 refused     Influenza (3yrs and older) Declined 09/06/2023 refused     Influenza (3yrs and older) Declined 08/04/2023 refused     Pap-Liquid-based Declined 08/04/2023 refused     Influenza (3yrs and older) Declined 07/26/2023 refused     Pap-Liquid-based Declined 07/26/2023 refused     Influenza (3yrs and older) Declined 06/28/2023 preference         Medications (Added, Continued or Stopped today)  Start Date Medication Directions PRN Status PRN  Reason Instruction Stop Date    Procardia XL 30 mg tablet,extended release take 1 tablet by oral route  every day N   10/24/2023   09/06/2023 sertraline 25 mg tablet take 1 tablet by oral route  every day N   10/24/2023     Counseling / Educational Factors  Counseling / educational factors reviewed.

## 2023-10-26 ENCOUNTER — APPOINTMENT (OUTPATIENT)
Dept: GENERAL RADIOLOGY | Facility: HOSPITAL | Age: 23
End: 2023-10-26
Payer: COMMERCIAL

## 2023-10-26 ENCOUNTER — HOSPITAL ENCOUNTER (OUTPATIENT)
Facility: HOSPITAL | Age: 23
Setting detail: HOSPITAL OUTPATIENT SURGERY
Discharge: HOME | End: 2023-10-26
Attending: OBSTETRICS & GYNECOLOGY | Admitting: OBSTETRICS & GYNECOLOGY
Payer: COMMERCIAL

## 2023-10-26 ENCOUNTER — ANESTHESIA EVENT (OUTPATIENT)
Dept: PERIOP | Facility: HOSPITAL | Age: 23
End: 2023-10-26
Payer: COMMERCIAL

## 2023-10-26 ENCOUNTER — ANESTHESIA (OUTPATIENT)
Dept: PERIOP | Facility: HOSPITAL | Age: 23
End: 2023-10-26
Payer: COMMERCIAL

## 2023-10-26 VITALS
HEIGHT: 65 IN | OXYGEN SATURATION: 98 % | HEART RATE: 78 BPM | SYSTOLIC BLOOD PRESSURE: 118 MMHG | BODY MASS INDEX: 29.16 KG/M2 | RESPIRATION RATE: 18 BRPM | WEIGHT: 175 LBS | DIASTOLIC BLOOD PRESSURE: 65 MMHG | TEMPERATURE: 97.6 F

## 2023-10-26 DIAGNOSIS — Z01.818 PREOP EXAMINATION: ICD-10-CM

## 2023-10-26 DIAGNOSIS — Z30.2 ENCOUNTER FOR FEMALE STERILIZATION PROCEDURE: ICD-10-CM

## 2023-10-26 PROCEDURE — 25010000002 NEOSTIGMINE 10 MG/10ML SOLUTION: Performed by: NURSE ANESTHETIST, CERTIFIED REGISTERED

## 2023-10-26 PROCEDURE — 25010000002 PROPOFOL 200 MG/20ML EMULSION: Performed by: NURSE ANESTHETIST, CERTIFIED REGISTERED

## 2023-10-26 PROCEDURE — 25010000002 FENTANYL CITRATE (PF) 50 MCG/ML SOLUTION: Performed by: NURSE ANESTHETIST, CERTIFIED REGISTERED

## 2023-10-26 PROCEDURE — 25010000002 MIDAZOLAM PER 1 MG: Performed by: NURSE ANESTHETIST, CERTIFIED REGISTERED

## 2023-10-26 PROCEDURE — 25810000003 LACTATED RINGERS PER 1000 ML: Performed by: NURSE ANESTHETIST, CERTIFIED REGISTERED

## 2023-10-26 PROCEDURE — 25010000002 ONDANSETRON PER 1 MG: Performed by: NURSE ANESTHETIST, CERTIFIED REGISTERED

## 2023-10-26 RX ORDER — ONDANSETRON 2 MG/ML
INJECTION INTRAMUSCULAR; INTRAVENOUS AS NEEDED
Status: DISCONTINUED | OUTPATIENT
Start: 2023-10-26 | End: 2023-10-26 | Stop reason: SURG

## 2023-10-26 RX ORDER — MAGNESIUM HYDROXIDE 1200 MG/15ML
LIQUID ORAL AS NEEDED
Status: DISCONTINUED | OUTPATIENT
Start: 2023-10-26 | End: 2023-10-26 | Stop reason: HOSPADM

## 2023-10-26 RX ORDER — PROPOFOL 10 MG/ML
INJECTION, EMULSION INTRAVENOUS AS NEEDED
Status: DISCONTINUED | OUTPATIENT
Start: 2023-10-26 | End: 2023-10-26 | Stop reason: SURG

## 2023-10-26 RX ORDER — MEPERIDINE HYDROCHLORIDE 25 MG/ML
12.5 INJECTION INTRAMUSCULAR; INTRAVENOUS; SUBCUTANEOUS
Status: DISCONTINUED | OUTPATIENT
Start: 2023-10-26 | End: 2023-10-26 | Stop reason: HOSPADM

## 2023-10-26 RX ORDER — ROCURONIUM BROMIDE 10 MG/ML
INJECTION, SOLUTION INTRAVENOUS AS NEEDED
Status: DISCONTINUED | OUTPATIENT
Start: 2023-10-26 | End: 2023-10-26 | Stop reason: SURG

## 2023-10-26 RX ORDER — HYDROCODONE BITARTRATE AND ACETAMINOPHEN 5; 325 MG/1; MG/1
1 TABLET ORAL EVERY 6 HOURS PRN
Qty: 15 TABLET | Refills: 0 | Status: SHIPPED | OUTPATIENT
Start: 2023-10-26

## 2023-10-26 RX ORDER — FENTANYL CITRATE 50 UG/ML
50 INJECTION, SOLUTION INTRAMUSCULAR; INTRAVENOUS
Status: DISCONTINUED | OUTPATIENT
Start: 2023-10-26 | End: 2023-10-26 | Stop reason: HOSPADM

## 2023-10-26 RX ORDER — IPRATROPIUM BROMIDE AND ALBUTEROL SULFATE 2.5; .5 MG/3ML; MG/3ML
3 SOLUTION RESPIRATORY (INHALATION) ONCE AS NEEDED
Status: DISCONTINUED | OUTPATIENT
Start: 2023-10-26 | End: 2023-10-26 | Stop reason: HOSPADM

## 2023-10-26 RX ORDER — ONDANSETRON 2 MG/ML
4 INJECTION INTRAMUSCULAR; INTRAVENOUS AS NEEDED
Status: DISCONTINUED | OUTPATIENT
Start: 2023-10-26 | End: 2023-10-26 | Stop reason: HOSPADM

## 2023-10-26 RX ORDER — SODIUM CHLORIDE 0.9 % (FLUSH) 0.9 %
10 SYRINGE (ML) INJECTION AS NEEDED
Status: DISCONTINUED | OUTPATIENT
Start: 2023-10-26 | End: 2023-10-26 | Stop reason: HOSPADM

## 2023-10-26 RX ORDER — SODIUM CHLORIDE 9 MG/ML
40 INJECTION, SOLUTION INTRAVENOUS AS NEEDED
Status: DISCONTINUED | OUTPATIENT
Start: 2023-10-26 | End: 2023-10-26 | Stop reason: HOSPADM

## 2023-10-26 RX ORDER — IBUPROFEN 600 MG/1
600 TABLET ORAL EVERY 6 HOURS PRN
Qty: 60 TABLET | Refills: 0 | Status: SHIPPED | OUTPATIENT
Start: 2023-10-26

## 2023-10-26 RX ORDER — FENTANYL CITRATE 50 UG/ML
INJECTION, SOLUTION INTRAMUSCULAR; INTRAVENOUS AS NEEDED
Status: DISCONTINUED | OUTPATIENT
Start: 2023-10-26 | End: 2023-10-26 | Stop reason: SURG

## 2023-10-26 RX ORDER — GLYCOPYRROLATE 0.2 MG/ML
INJECTION INTRAMUSCULAR; INTRAVENOUS AS NEEDED
Status: DISCONTINUED | OUTPATIENT
Start: 2023-10-26 | End: 2023-10-26 | Stop reason: SURG

## 2023-10-26 RX ORDER — ASPIRIN 325 MG
1 TABLET ORAL
COMMUNITY

## 2023-10-26 RX ORDER — SODIUM CHLORIDE, SODIUM LACTATE, POTASSIUM CHLORIDE, CALCIUM CHLORIDE 600; 310; 30; 20 MG/100ML; MG/100ML; MG/100ML; MG/100ML
INJECTION, SOLUTION INTRAVENOUS CONTINUOUS PRN
Status: DISCONTINUED | OUTPATIENT
Start: 2023-10-26 | End: 2023-10-26 | Stop reason: SURG

## 2023-10-26 RX ORDER — FAMOTIDINE 10 MG/ML
INJECTION, SOLUTION INTRAVENOUS AS NEEDED
Status: DISCONTINUED | OUTPATIENT
Start: 2023-10-26 | End: 2023-10-26 | Stop reason: SURG

## 2023-10-26 RX ORDER — SODIUM CHLORIDE, SODIUM LACTATE, POTASSIUM CHLORIDE, CALCIUM CHLORIDE 600; 310; 30; 20 MG/100ML; MG/100ML; MG/100ML; MG/100ML
100 INJECTION, SOLUTION INTRAVENOUS ONCE AS NEEDED
Status: DISCONTINUED | OUTPATIENT
Start: 2023-10-26 | End: 2023-10-26 | Stop reason: HOSPADM

## 2023-10-26 RX ORDER — OXYCODONE HYDROCHLORIDE AND ACETAMINOPHEN 5; 325 MG/1; MG/1
1 TABLET ORAL ONCE AS NEEDED
Status: COMPLETED | OUTPATIENT
Start: 2023-10-26 | End: 2023-10-26

## 2023-10-26 RX ORDER — NEOSTIGMINE METHYLSULFATE 1 MG/ML
INJECTION, SOLUTION INTRAVENOUS AS NEEDED
Status: DISCONTINUED | OUTPATIENT
Start: 2023-10-26 | End: 2023-10-26 | Stop reason: SURG

## 2023-10-26 RX ORDER — SODIUM CHLORIDE 0.9 % (FLUSH) 0.9 %
10 SYRINGE (ML) INJECTION EVERY 12 HOURS SCHEDULED
Status: DISCONTINUED | OUTPATIENT
Start: 2023-10-26 | End: 2023-10-26 | Stop reason: HOSPADM

## 2023-10-26 RX ORDER — LIDOCAINE HYDROCHLORIDE 20 MG/ML
INJECTION, SOLUTION EPIDURAL; INFILTRATION; INTRACAUDAL; PERINEURAL AS NEEDED
Status: DISCONTINUED | OUTPATIENT
Start: 2023-10-26 | End: 2023-10-26 | Stop reason: SURG

## 2023-10-26 RX ORDER — MIDAZOLAM HYDROCHLORIDE 1 MG/ML
INJECTION INTRAMUSCULAR; INTRAVENOUS AS NEEDED
Status: DISCONTINUED | OUTPATIENT
Start: 2023-10-26 | End: 2023-10-26 | Stop reason: SURG

## 2023-10-26 RX ADMIN — OXYCODONE HYDROCHLORIDE AND ACETAMINOPHEN 1 TABLET: 5; 325 TABLET ORAL at 08:48

## 2023-10-26 RX ADMIN — LIDOCAINE HYDROCHLORIDE 100 MG: 20 INJECTION, SOLUTION EPIDURAL; INFILTRATION; INTRACAUDAL; PERINEURAL at 07:34

## 2023-10-26 RX ADMIN — FENTANYL CITRATE 50 MCG: 50 INJECTION, SOLUTION INTRAMUSCULAR; INTRAVENOUS at 08:31

## 2023-10-26 RX ADMIN — FENTANYL CITRATE 100 MCG: 50 INJECTION, SOLUTION INTRAMUSCULAR; INTRAVENOUS at 07:28

## 2023-10-26 RX ADMIN — ROCURONIUM BROMIDE 20 MG: 10 INJECTION, SOLUTION INTRAVENOUS at 07:34

## 2023-10-26 RX ADMIN — GLYCOPYRROLATE 0.4 MG: 0.4 INJECTION INTRAMUSCULAR; INTRAVENOUS at 07:55

## 2023-10-26 RX ADMIN — FAMOTIDINE 20 MG: 10 INJECTION, SOLUTION INTRAVENOUS at 07:28

## 2023-10-26 RX ADMIN — ONDANSETRON 4 MG: 2 INJECTION INTRAMUSCULAR; INTRAVENOUS at 07:28

## 2023-10-26 RX ADMIN — SODIUM CHLORIDE, POTASSIUM CHLORIDE, SODIUM LACTATE AND CALCIUM CHLORIDE: 600; 310; 30; 20 INJECTION, SOLUTION INTRAVENOUS at 07:28

## 2023-10-26 RX ADMIN — MIDAZOLAM HYDROCHLORIDE 2 MG: 1 INJECTION, SOLUTION INTRAMUSCULAR; INTRAVENOUS at 07:28

## 2023-10-26 RX ADMIN — PROPOFOL 200 MG: 10 INJECTION, EMULSION INTRAVENOUS at 07:34

## 2023-10-26 RX ADMIN — FENTANYL CITRATE 50 MCG: 50 INJECTION, SOLUTION INTRAMUSCULAR; INTRAVENOUS at 08:24

## 2023-10-26 RX ADMIN — NEOSTIGMINE METHYLSULFATE 2.5 MG: 0.5 INJECTION INTRAVENOUS at 07:55

## 2023-10-26 NOTE — OP NOTE
Bilateral Salpingectomy Operation Note    Bianca Nieves  10/26/2023    Pre-op Diagnosis:   STERILIZATION Z30.2    Post-op Diagnosis:     Same    Procedure(s):  SALPINGECTOMY LAPAROSCOPIC     Surgeon(s):  Crystal Hoyos DO    Anesthesia: General    Staff:   Circulator: Ave Gan RN  Scrub Person: Tessa Randhawa; Laura Neumann  Vendor Representative: Luda Sandy    was responsible for performing the following activities: Retraction, Placing Dressing, and Held/Positioned Camera and their skilled assistance was necessary for the success of this case.      Estimated Blood Loss: none    Grafts/Implants: None    Procedure     The patient was prepped and draped in normal sterile fashion. Umbilical incision was made with a scalpel. A nonbladed trocar was placed under direct visualization. The abdomen was insufflated with CO2 gas. Ports were placed in bilateral lower quadrants. The Enseal device was used to remove both fallopian tubes. The skin was closed with 4-0 Monocryl and Dermabond. The patient tolerated the procedure and went to recovery room in stable condition.         Crystal Hoyos DO     Date: 10/26/2023  Time: 08:13 EDT

## 2023-10-26 NOTE — ANESTHESIA PROCEDURE NOTES
Airway  Urgency: elective    Date/Time: 10/26/2023 7:35 AM  Airway not difficult    General Information and Staff    Patient location during procedure: OR  CRNA/CAA: Viridiana Wayne CRNA    Indications and Patient Condition  Indications for airway management: airway protection    Preoxygenated: yes  MILS maintained throughout  Mask difficulty assessment: 0 - not attempted    Final Airway Details  Final airway type: endotracheal airway      Successful airway: ETT  Cuffed: yes   Successful intubation technique: direct laryngoscopy  Facilitating devices/methods: intubating stylet  Endotracheal tube insertion site: oral  Blade: Senait  Blade size: 3  ETT size (mm): 7.5  Cormack-Lehane Classification: grade I - full view of glottis  Placement verified by: chest auscultation and capnometry   Measured from: lips  ETT/EBT  to lips (cm): 20  Number of attempts at approach: 1  Assessment: lips, teeth, and gum same as pre-op

## 2023-10-26 NOTE — ANESTHESIA POSTPROCEDURE EVALUATION
Patient: Bianca Nieves    Procedure Summary       Date: 10/26/23 Room / Location: [facility] OR 09 /  [facility] OR    Anesthesia Start: 0728 Anesthesia Stop: 0806    Procedure: SALPINGECTOMY LAPAROSCOPIC (Bilateral: Vagina) Diagnosis: (STERILIZATION Z30.2)    Surgeons: Crystal Hoyos DO Provider: Chuy Fields DO    Anesthesia Type: general ASA Status: 2            Anesthesia Type: general    Vitals  Vitals Value Taken Time   /54 10/26/23 0838   Temp 97.4 °F (36.3 °C) 10/26/23 0808   Pulse 64 10/26/23 0841   Resp 21 10/26/23 0838   SpO2 95 % 10/26/23 0841   Vitals shown include unfiled device data.        Post Anesthesia Care and Evaluation    Patient location during evaluation: PHASE II  Patient participation: complete - patient participated  Level of consciousness: awake and alert  Pain score: 0  Pain management: adequate    Airway patency: patent  Anesthetic complications: No anesthetic complications    Cardiovascular status: acceptable  Respiratory status: acceptable  Hydration status: acceptable

## 2023-10-30 LAB — REF LAB TEST METHOD: NORMAL

## 2023-12-06 ENCOUNTER — TELEMEDICINE (OUTPATIENT)
Dept: PSYCHIATRY | Facility: CLINIC | Age: 23
End: 2023-12-06
Payer: COMMERCIAL

## 2023-12-06 NOTE — PROGRESS NOTES
This provider is located at the [facility] (through [facility] [place] [facility]), [address], [place] [address] using a secure MyChart Video Visit through [employer]. Patient is being seen remotely via telehealth at home address in [place] and stated they are in a secure environment for this session. The patient's condition being diagnosed/treated is appropriate for telemedicine. The provider identified herself as well as her credentials. The patient, and/or patients guardian, consent to be seen remotely, and when consent is given they understand that the consent allows for patient identifiable information to be sent to a third party as needed. They may refuse to be seen remotely at any time. The electronic data is encrypted and password protected, and the patient and/or guardian has been advised of the potential risks to privacy not withstanding such measures.     You have chosen to receive care through a telehealth visit.  Do you consent to use a video/audio connection for your medical care today? Yes    Subjective   Bianca Nieves is a 23 y.o. female who presents today for initial evaluation .Patient arrived late to her appointment and then was unable to connect.  Therapist reported the issue to , Rachel Sood, who would then called the patient to see what might be the issue.  Patient canceled her appointment and rescheduled for next week, per Rachel.  Rachel notified this therapist that the patient had to cancel the appointment as she was at a bus stop trying to  her child and was not getting enough Wi-Fi reception to join the call by voice or video      Time: 10:54 AM Time Out: 10:57 AM       This document has been electronically signed by LYUDMILA Pennington  December 6, 2023 10:56 EST    Part of this note may be an electronic transcription/translation of spoken language to printed text using the Dragon Dictation System.

## 2023-12-11 ENCOUNTER — TELEMEDICINE (OUTPATIENT)
Dept: PSYCHIATRY | Facility: CLINIC | Age: 23
End: 2023-12-11
Payer: COMMERCIAL

## 2023-12-11 DIAGNOSIS — G47.9 SLEEP DISTURBANCE: ICD-10-CM

## 2023-12-11 DIAGNOSIS — F31.81 BIPOLAR II DISORDER: Primary | ICD-10-CM

## 2023-12-11 DIAGNOSIS — F41.0 PANIC ANXIETY SYNDROME: ICD-10-CM

## 2023-12-11 DIAGNOSIS — Z79.899 MEDICATION MANAGEMENT: ICD-10-CM

## 2023-12-11 RX ORDER — HYDROXYZINE PAMOATE 25 MG/1
CAPSULE ORAL
Qty: 120 CAPSULE | Refills: 0 | Status: SHIPPED | OUTPATIENT
Start: 2023-12-11

## 2023-12-11 RX ORDER — ARIPIPRAZOLE 5 MG/1
5 TABLET ORAL DAILY
Qty: 30 TABLET | Refills: 0 | Status: SHIPPED | OUTPATIENT
Start: 2023-12-11

## 2023-12-11 NOTE — PROGRESS NOTES
This provider is located at the [facility] (through [facility]), [address], [place], [address] using a secure video visit through [employer]. The patient's condition being consulted/diagnosed/treated is appropriate for telemedicine. The provider identified herself as well as her credentials.   The patient, and/or patients guardian, consent to be seen remotely, and when consent is given they understand that the consent allows for patient identifiable information to be sent to a third party as needed. They may refuse to be seen remotely at any time. The electronic data is encrypted and password protected, and the patient and/or guardian has been advised of the potential risks to privacy not withstanding such measures.   The use of a video visit has been reviewed with the patient and verbal informed consent has been obtained.   Patient identifiers used: Name and .    Subjective   Bianca Nieves is a 23 y.o. female who presents today for follow up    Chief Complaint:    Chief Complaint   Patient presents with    Anxiety    Depression    Med Management    Sleeping Problem    Irritable        History of Present Illness:    History of Present Illness  Patient is a 22-year-old female presenting for a reestablishment of care appointment.  Patient last seen 2022.  Since previous assessment, patient has been pregnant and delivered a new child.  Multiple situational changes, see notes below.  She has not been prescribed any recent psychotropics.  Believes Lamictal was beneficial previously when used, unsure of efficacy with use of Zoloft.  Stopped both medications when learning she was pregnant.  Her largest concern today is irritability as well as panic and sleep deprivation.  PHQ score is 19 indicates moderately severe depression which patient currently rates a 5/10.  KHOI score of 21 indicates severe anxiety which patient currently rates a 7/10.  She states that she  "experiences panic \"every day, do not know why.  I will feel perfectly fine the next second.\"  She states \"I do not get tired.\"  She goes approximately 3 days without sleep monthly and will \"be cleaning and then feel like I have to crash but cannot crash\" indicating she needs to be present for her children.  She states that she has been \"snappy with children.\"  Also acknowledges impulsive behavior, having communication with her ex which she states is out of her norm.  Previously suffered from intrusive thoughts, none since previous sessions which occurred over a year ago.  Currently suffering from a poor appetite, states she has went \"6 or 7 days without food and I am not hungry.\"  She denies SI, HI, SIB, or hallucinations currently and is convincing.  MDQ previously performed and positive.  She recently had her tubes removed, denies chance of pregnancy currently.  Denies alternate medical status changes.    Patient now resides in a house with her 4 young children and significant other who is the father of her youngest 2 children.  She is not breast-feeding. 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  Denies alcohol or drug use. She is on a break from school in medical billing, hopeful to resume next year. Currently in counseling-appt scheduled.        Last Menstrual Period: 3 weeks ago-tube removed    The patient denies any chance of pregnancy at this time.  The patient was educated that her prescribed medications can have potential risk to a developing fetus. The patient is advised to contact this APRN/this office if she becomes pregnant or plans to become pregnant.  Pt verbalizes understanding and acknowledged agreement with this plan in her own words.        The following portions of the patient's history were reviewed and updated as appropriate: allergies, current medications, past family history, past medical history, past social history, past surgical " history and problem list.    Past Psychiatric History:  Began Treatment:   Diagnoses: Bipolar II  Psychiatrist:Mariano  Therapist:Denies  Admission History:Denies  Medication Trials: paxil, hydroxyzine, zoloft  Self Harm:  last week  Suicide Attempts:Denies   Psychosis, Anxiety, Depression:  depression and anxiety, denies psychosis    Past Medical History:  Past Medical History:   Diagnosis Date    Anxiety     Depression     H/O seasonal allergies     PPH (postpartum hemorrhage)     Psoriasis     Urinary tract infection        Substance Abuse History:   Types:Denies all, including illicit  Withdrawal Symptoms:Denies  Longest Period Sober:Not Applicable   AA: Not applicable     Social History:  Social History     Socioeconomic History    Marital status: Significant Other     Spouse name: Bill David    Number of children: 3    Highest education level: High school graduate   Tobacco Use    Smoking status: Former     Packs/day: 0.75     Years: 4.00     Additional pack years: 0.00     Total pack years: 3.00     Types: Cigarettes     Passive exposure: Never    Smokeless tobacco: Never    Tobacco comments:     PT STATES IS AROUND SMOKING EVERY DAY   Vaping Use    Vaping Use: Some days    Substances: Nicotine, Flavoring    Devices: Disposable   Substance and Sexual Activity    Alcohol use: Yes     Comment: occasionally    Drug use: No    Sexual activity: Defer     Partners: Male     Birth control/protection: None       Family History:  Family History   Problem Relation Age of Onset    No Known Problems Mother     Alcohol abuse Father     Diabetes Father     Asthma Maternal Grandmother     Diabetes Paternal Grandmother     Asthma Paternal Grandmother        Past Surgical History:  Past Surgical History:   Procedure Laterality Date    DILATATION AND CURETTAGE N/A 2017    Procedure: DILATATION AND CURETTAGE;  Surgeon: Juan Pablo Wood MD;  Location: [facility];  Service:     HEAD/NECK LESION/CYST EXCISION N/A  2020    Procedure: FACIAL LESION/CYST EXCISION;  Surgeon: Jay Cohen MD;  Location: [facility] OR;  Service: General;  Laterality: N/A;    SALPINGECTOMY Bilateral 10/26/2023    Procedure: SALPINGECTOMY LAPAROSCOPIC;  Surgeon: Crystal Hoyos DO;  Location: [facility] OR;  Service: Obstetrics/Gynecology;  Laterality: Bilateral;       Problem List:  Patient Active Problem List   Diagnosis    Right upper quadrant abdominal pain    Leukocytosis    Abdominal pain affecting pregnancy    Pregnancy    Abdominal pain during pregnancy    Visit for wound check    UTI (urinary tract infection)     labor    Threatened  labor, third trimester    33 weeks gestation of pregnancy    Normal labor     labor in third trimester    37 weeks gestation of pregnancy    Generalized anxiety disorder    Abdominal pain during pregnancy in third trimester    Uterine cramping     contractions    Uterine contractions       Allergy:   No Known Allergies     Current Medications:   Current Outpatient Medications   Medication Sig Dispense Refill    ibuprofen (ADVIL,MOTRIN) 600 MG tablet Take 1 tablet by mouth Every 6 (Six) Hours As Needed for Mild Pain. 60 tablet 0    Multiple Vitamins-Minerals (Multivitamin Gummies Adult) chewable tablet Chew 1 tablet/day.      ARIPiprazole (Abilify) 5 MG tablet Take 1 tablet by mouth Daily. 30 tablet 0    hydrOXYzine pamoate (Vistaril) 25 MG capsule Take 1-2 caps by mouth twice daily as needed for anxiety/panic 120 capsule 0     No current facility-administered medications for this visit.       Review of Systems:    Review of Systems   Constitutional:  Positive for appetite change.   HENT: Negative.     Eyes: Negative.    Respiratory: Negative.     Cardiovascular: Negative.    Gastrointestinal: Negative.    Endocrine: Negative.    Genitourinary: Negative.    Musculoskeletal: Negative.    Skin:  Positive for rash.        Legs, elbow, eyebrow   Allergic/Immunologic: Negative.     Neurological:  Negative for seizures.   Hematological: Negative.    Psychiatric/Behavioral:  Positive for sleep disturbance, depressed mood and stress. The patient is nervous/anxious.    All other systems reviewed and are negative.        Physical Exam:   Physical Exam  Constitutional:       Appearance: Normal appearance.   HENT:      Head: Normocephalic.      Nose: Nose normal.   Pulmonary:      Effort: Pulmonary effort is normal.   Musculoskeletal:         General: Normal range of motion.      Cervical back: Normal range of motion.   Skin:     Findings: Bruising present.      Comments: Bruise to lower left arm from self-inflicted injury. No longer open. Educated upon signs of infection.    Neurological:      Mental Status: She is alert.   Psychiatric:         Attention and Perception: Attention normal.         Mood and Affect: Mood is anxious and depressed. Affect is flat.         Speech: Speech normal.         Behavior: Behavior is cooperative.         Thought Content: Thought content normal.         Cognition and Memory: Cognition normal.         Judgment: Judgment normal.         Vitals:  not currently breastfeeding. There is no height or weight on file to calculate BMI.  Due to extenuating circumstances and possible current health risks associated with the patient being present in a clinical setting (with current health restrictions in place in regards to possible COVID 19 transmission/exposure), the patient was seen remotely today via a MyChart Video Visit through [employer].  Unable to obtain vital signs due to nature of remote visit.  Height stated at 65 inches.  Weight stated at 185 pounds.    Last 3 Blood Pressure Readings:  BP Readings from Last 3 Encounters:   10/26/23 118/65   08/12/23 118/69   08/10/23 126/60       PHQ-9 Score:   PHQ-9 Total Score:   PHQ-9 Depression Screening  Little interest or pleasure in doing things? (P) 2-->more than half the days   Feeling down, depressed, or hopeless? (P)  3-->nearly every day   Trouble falling or staying asleep, or sleeping too much? (P) 3-->nearly every day   Feeling tired or having little energy? (P) 3-->nearly every day   Poor appetite or overeating? (P) 3-->nearly every day   Feeling bad about yourself - or that you are a failure or have let yourself or your family down? (P) 2-->more than half the days   Trouble concentrating on things, such as reading the newspaper or watching television? (P) 3-->nearly every day   Moving or speaking so slowly that other people could have noticed? Or the opposite - being so fidgety or restless that you have been moving around a lot more than usual? (P) 0-->not at all   Thoughts that you would be better off dead, or of hurting yourself in some way? (P) 0-->not at all   PHQ-9 Total Score (P) 19   If you checked off any problems, how difficult have these problems made it for you to do your work, take care of things at home, or get along with other people? (P) extremely difficult         KHOI-7 Score:   Feeling nervous, anxious or on edge: (P) Nearly every day  Not being able to stop or control worrying: (P) Nearly every day  Worrying too much about different things: (P) Nearly every day  Trouble Relaxing: (P) Nearly every day  Being so restless that it is hard to sit still: (P) Nearly every day  Feeling afraid as if something awful might happen: (P) Nearly every day  Becoming easily annoyed or irritable: (P) Nearly every day  KHOI 7 Total Score: (P) 21  If you checked any problems, how difficult have these problems made it for you to do your work, take care of things at home, or get along with other people: (P) Extremely difficult               Mental Status Exam:   Hygiene:   fair  Cooperation:  Cooperative  Eye Contact:  Good  Psychomotor Behavior:  Appropriate  Affect:  Blunted  Mood: anxious  Hopelessness: Denies  Speech:  Normal  Thought Process:  Goal directed and Linear  Thought Content:  Mood congruent  Suicidal:  None and  "some previously, not x 1 month  Homicidal:  None  Hallucinations:  None  Delusion:  None  Memory:  Intact  Orientation:  Grossly intact  Reliability:  good  Insight:  Fair  Judgement:  Fair  Impulse Control:  Fair  Physical/Medical Issues:  No        Lab Results:   Admission on 10/26/2023, Discharged on 10/26/2023   Component Date Value Ref Range Status    Reference Lab Report 10/26/2023    Final                    Value:[facility]  [address] [place] [address]  Phone: [phone] or [phone]  Fax: [phone]  Blade Clarke M.D., Medical Director    PATIENT NAME                           LABORATORY NO.  [id number]  JASMIN VENTURA                       [id number]  [id number]                         AGE              SEX  SSN           CLIENT REF #  [facility]              23      2000  F    [id number]   [id number]    [employer]                     REQUESTING M.D.     ATTENDING [place] [address]                   CORETTA CHAMPION  DATE COLLECTED      DATE RECEIVED      DATE REPORTED  10/26/2023          10/26/2023         10/30/2023    DIAGNOSIS:  FALLOPIAN TUBE, EXCISION, BILATERAL:  Benign bilateral fallopian tubes with no significant diagnostic alterations    CAM    CLINICAL HISTORY:  Preop examination    SPECIMENS RECEIVED:  FALLOPIAN TUBE, EXCISION, BILATERAL    MICROSCOPIC DESCRIPTION:  Tissue blocks are prepared                           and slides are examined microscopically. See  diagnosis for details.    Professional interpretation rendered by Silva Monroy M.D., F.C.A.P. at  [facility], [address], [place] [address].    GROSS DESCRIPTION:  Received in formalin labeled \"bilateral fallopian tubes\" are 2 intact and  unoriented fallopian tubes.  Fallopian tube #1 measures 6.2 cm in length and 0.7  cm in diameter.  The serosal surface is tan-purple and smooth with a fimbriated  end.  The fallopian tube is serially " sectioned to reveal a pinpoint lumen.  Fallopian tube #2 measures 5.1 cm in length and 0.7 cm in diameter.  The serosal  surface is tan-purple and smooth with a fimbriated end.  The fallopian tube is  serially sectioned to reveal a pinpoint lumen.    Representative sections of the specimen are submitted as follows:  A1 fallopian tube #1 with entire fimbriated end  A2 fallopian tube #2 with entire fimbriated end.  [place]    REVIEWED, DIAGNOSED AND ELECTRONICALLY  SIGNED BY:    Silva Monroy M.D.,                           F.C.A.P.  CPT CODES:  19504     Pre-Admission Testing on 10/24/2023   Component Date Value Ref Range Status    ABO Type 10/24/2023 O   Final    RH type 10/24/2023 Negative   Final    Antibody Screen 10/24/2023 Positive   Final    T&S Expiration Date 10/24/2023 10/27/2023 11:59:59 PM   Final    HCG Qualitative 10/24/2023 Negative  Negative Final    WBC 10/24/2023 8.40  3.40 - 10.80 10*3/mm3 Final    RBC 10/24/2023 4.56  3.77 - 5.28 10*6/mm3 Final    Hemoglobin 10/24/2023 12.9  12.0 - 15.9 g/dL Final    Hematocrit 10/24/2023 38.9  34.0 - 46.6 % Final    MCV 10/24/2023 85.3  79.0 - 97.0 fL Final    MCH 10/24/2023 28.3  26.6 - 33.0 pg Final    MCHC 10/24/2023 33.2  31.5 - 35.7 g/dL Final    RDW 10/24/2023 12.8  12.3 - 15.4 % Final    RDW-SD 10/24/2023 39.5  37.0 - 54.0 fl Final    MPV 10/24/2023 11.3  6.0 - 12.0 fL Final    Platelets 10/24/2023 187  140 - 450 10*3/mm3 Final    Neutrophil % 10/24/2023 60.5  42.7 - 76.0 % Final    Lymphocyte % 10/24/2023 32.6  19.6 - 45.3 % Final    Monocyte % 10/24/2023 3.9 (L)  5.0 - 12.0 % Final    Eosinophil % 10/24/2023 2.1  0.3 - 6.2 % Final    Basophil % 10/24/2023 0.5  0.0 - 1.5 % Final    Immature Grans % 10/24/2023 0.4  0.0 - 0.5 % Final    Neutrophils, Absolute 10/24/2023 5.08  1.70 - 7.00 10*3/mm3 Final    Lymphocytes, Absolute 10/24/2023 2.74  0.70 - 3.10 10*3/mm3 Final    Monocytes, Absolute 10/24/2023 0.33  0.10 - 0.90 10*3/mm3 Final    Eosinophils,  Absolute 10/24/2023 0.18  0.00 - 0.40 10*3/mm3 Final    Basophils, Absolute 10/24/2023 0.04  0.00 - 0.20 10*3/mm3 Final    Immature Grans, Absolute 10/24/2023 0.03  0.00 - 0.05 10*3/mm3 Final    nRBC 10/24/2023 0.0  0.0 - 0.2 /100 WBC Final    Residual RhIG Detected 10/24/2023 RESIDUAL RHIG DETECTED   Final   Admission on 08/09/2023, Discharged on 08/12/2023   Component Date Value Ref Range Status    External Strep Group B Ag 07/27/2023 POS   Final    External Chlamydia Screen 08/04/2023 NEG   Final    External Gonorrhea Screen 08/04/2023 NEG   Final    External GTT 1 Hour 06/09/2023 145   Final    External Hepatitis B Surface Ag 05/24/2023 Negative   Final    External RPR 05/24/2023 Non-Reactive   Final    External Rubella Qual 05/24/2023 Immune   Final    External HIV Antibody 05/24/2023 Non-Reactive   Final    THC, Screen, Urine 08/09/2023 Negative  Negative Final    Phencyclidine (PCP), Urine 08/09/2023 Negative  Negative Final    Cocaine Screen, Urine 08/09/2023 Negative  Negative Final    Methamphetamine, Ur 08/09/2023 Negative  Negative Final    Opiate Screen 08/09/2023 Negative  Negative Final    Amphetamine Screen, Urine 08/09/2023 Negative  Negative Final    Benzodiazepine Screen, Urine 08/09/2023 Negative  Negative Final    Tricyclic Antidepressants Screen 08/09/2023 Negative  Negative Final    Methadone Screen, Urine 08/09/2023 Negative  Negative Final    Barbiturates Screen, Urine 08/09/2023 Negative  Negative Final    Oxycodone Screen, Urine 08/09/2023 Negative  Negative Final    Propoxyphene Screen 08/09/2023 Negative  Negative Final    Buprenorphine, Screen, Urine 08/09/2023 Negative  Negative Final    Fentanyl, Urine 08/09/2023 Negative  Negative Final    ABO Type 08/09/2023 O   Final    RH type 08/09/2023 Negative   Final    Antibody Screen 08/09/2023 Positive   Final    T&S Expiration Date 08/09/2023 8/12/2023 11:59:59 PM   Final    WBC 08/09/2023 11.99 (H)  3.40 - 10.80 10*3/mm3 Final    RBC  08/09/2023 3.79  3.77 - 5.28 10*6/mm3 Final    Hemoglobin 08/09/2023 11.1 (L)  12.0 - 15.9 g/dL Final    Hematocrit 08/09/2023 33.7 (L)  34.0 - 46.6 % Final    MCV 08/09/2023 88.9  79.0 - 97.0 fL Final    MCH 08/09/2023 29.3  26.6 - 33.0 pg Final    MCHC 08/09/2023 32.9  31.5 - 35.7 g/dL Final    RDW 08/09/2023 14.0  12.3 - 15.4 % Final    RDW-SD 08/09/2023 45.0  37.0 - 54.0 fl Final    MPV 08/09/2023 10.6  6.0 - 12.0 fL Final    Platelets 08/09/2023 173  140 - 450 10*3/mm3 Final    Neutrophil % 08/09/2023 68.9  42.7 - 76.0 % Final    Lymphocyte % 08/09/2023 24.9  19.6 - 45.3 % Final    Monocyte % 08/09/2023 4.2 (L)  5.0 - 12.0 % Final    Eosinophil % 08/09/2023 1.1  0.3 - 6.2 % Final    Basophil % 08/09/2023 0.3  0.0 - 1.5 % Final    Immature Grans % 08/09/2023 0.6 (H)  0.0 - 0.5 % Final    Neutrophils, Absolute 08/09/2023 8.26 (H)  1.70 - 7.00 10*3/mm3 Final    Lymphocytes, Absolute 08/09/2023 2.99  0.70 - 3.10 10*3/mm3 Final    Monocytes, Absolute 08/09/2023 0.50  0.10 - 0.90 10*3/mm3 Final    Eosinophils, Absolute 08/09/2023 0.13  0.00 - 0.40 10*3/mm3 Final    Basophils, Absolute 08/09/2023 0.04  0.00 - 0.20 10*3/mm3 Final    Immature Grans, Absolute 08/09/2023 0.07 (H)  0.00 - 0.05 10*3/mm3 Final    nRBC 08/09/2023 0.0  0.0 - 0.2 /100 WBC Final    Residual RhIG Detected 08/09/2023 RESIDUAL RHIG DETECTED   Final    WBC 08/11/2023 10.39  3.40 - 10.80 10*3/mm3 Final    RBC 08/11/2023 3.46 (L)  3.77 - 5.28 10*6/mm3 Final    Hemoglobin 08/11/2023 10.2 (L)  12.0 - 15.9 g/dL Final    Hematocrit 08/11/2023 31.7 (L)  34.0 - 46.6 % Final    MCV 08/11/2023 91.6  79.0 - 97.0 fL Final    MCH 08/11/2023 29.5  26.6 - 33.0 pg Final    MCHC 08/11/2023 32.2  31.5 - 35.7 g/dL Final    RDW 08/11/2023 14.1  12.3 - 15.4 % Final    RDW-SD 08/11/2023 47.7  37.0 - 54.0 fl Final    MPV 08/11/2023 10.9  6.0 - 12.0 fL Final    Platelets 08/11/2023 139 (L)  140 - 450 10*3/mm3 Final    Neutrophil % 08/11/2023 66.8  42.7 - 76.0 % Final     Lymphocyte % 08/11/2023 26.6  19.6 - 45.3 % Final    Monocyte % 08/11/2023 4.6 (L)  5.0 - 12.0 % Final    Eosinophil % 08/11/2023 1.2  0.3 - 6.2 % Final    Basophil % 08/11/2023 0.3  0.0 - 1.5 % Final    Immature Grans % 08/11/2023 0.5  0.0 - 0.5 % Final    Neutrophils, Absolute 08/11/2023 6.95  1.70 - 7.00 10*3/mm3 Final    Lymphocytes, Absolute 08/11/2023 2.76  0.70 - 3.10 10*3/mm3 Final    Monocytes, Absolute 08/11/2023 0.48  0.10 - 0.90 10*3/mm3 Final    Eosinophils, Absolute 08/11/2023 0.12  0.00 - 0.40 10*3/mm3 Final    Basophils, Absolute 08/11/2023 0.03  0.00 - 0.20 10*3/mm3 Final    Immature Grans, Absolute 08/11/2023 0.05  0.00 - 0.05 10*3/mm3 Final    nRBC 08/11/2023 0.0  0.0 - 0.2 /100 WBC Final    ABO Type 08/11/2023 O   Final    RH type 08/11/2023 Negative   Final    Fetal Bleed Screen 08/11/2023 Negative   Final    Number of Doses 08/11/2023 Fetal Screen Negative - Recommend 1 vial of RhIg (A)  RhIg is not Indicated, RhIg is not indicated due to the patient's Rh status Final   Admission on 07/31/2023, Discharged on 07/31/2023   Component Date Value Ref Range Status    WBC 07/31/2023 11.76 (H)  3.40 - 10.80 10*3/mm3 Final    RBC 07/31/2023 3.62 (L)  3.77 - 5.28 10*6/mm3 Final    Hemoglobin 07/31/2023 10.7 (L)  12.0 - 15.9 g/dL Final    Hematocrit 07/31/2023 32.4 (L)  34.0 - 46.6 % Final    MCV 07/31/2023 89.5  79.0 - 97.0 fL Final    MCH 07/31/2023 29.6  26.6 - 33.0 pg Final    MCHC 07/31/2023 33.0  31.5 - 35.7 g/dL Final    RDW 07/31/2023 13.9  12.3 - 15.4 % Final    RDW-SD 07/31/2023 45.3  37.0 - 54.0 fl Final    MPV 07/31/2023 10.7  6.0 - 12.0 fL Final    Platelets 07/31/2023 161  140 - 450 10*3/mm3 Final    Color, UA 07/31/2023 Yellow  Yellow, Straw Final    Appearance, UA 07/31/2023 Clear  Clear Final    pH, UA 07/31/2023 6.5  5.0 - 8.0 Final    Specific Gravity, UA 07/31/2023 1.028  1.005 - 1.030 Final    Glucose, UA 07/31/2023 Negative  Negative Final    Ketones, UA 07/31/2023 Trace (A)   Negative Final    Bilirubin, UA 07/31/2023 Negative  Negative Final    Blood, UA 07/31/2023 Large (3+) (A)  Negative Final    Protein, UA 07/31/2023 Trace (A)  Negative Final    Leuk Esterase, UA 07/31/2023 Small (1+) (A)  Negative Final    Nitrite, UA 07/31/2023 Negative  Negative Final    Urobilinogen, UA 07/31/2023 1.0 E.U./dL  0.2 - 1.0 E.U./dL Final    RBC, UA 07/31/2023 Too Numerous to Count (A)  None Seen, 0-2 /HPF Final    WBC, UA 07/31/2023 21-30 (A)  None Seen, 0-2 /HPF Final    Bacteria, UA 07/31/2023 4+ (A)  None Seen /HPF Final    Squamous Epithelial Cells, UA 07/31/2023 0-2  None Seen, 0-2 /HPF Final    Hyaline Casts, UA 07/31/2023 3-6  None Seen /LPF Final    Methodology 07/31/2023 Automated Microscopy   Final    Urine Culture 07/31/2023 >100,000 CFU/mL Klebsiella pneumoniae ssp pneumoniae (A)   Final   Admission on 06/24/2023, Discharged on 06/24/2023   Component Date Value Ref Range Status    WBC 06/24/2023 12.73 (H)  3.40 - 10.80 10*3/mm3 Final    RBC 06/24/2023 3.67 (L)  3.77 - 5.28 10*6/mm3 Final    Hemoglobin 06/24/2023 11.1 (L)  12.0 - 15.9 g/dL Final    Hematocrit 06/24/2023 32.6 (L)  34.0 - 46.6 % Final    MCV 06/24/2023 88.8  79.0 - 97.0 fL Final    MCH 06/24/2023 30.2  26.6 - 33.0 pg Final    MCHC 06/24/2023 34.0  31.5 - 35.7 g/dL Final    RDW 06/24/2023 14.1  12.3 - 15.4 % Final    RDW-SD 06/24/2023 45.2  37.0 - 54.0 fl Final    MPV 06/24/2023 10.5  6.0 - 12.0 fL Final    Platelets 06/24/2023 172  140 - 450 10*3/mm3 Final    ABO Type 06/24/2023 O   Final    RH type 06/24/2023 Negative   Final    Antibody Screen 06/24/2023 Positive   Final    T&S Expiration Date 06/24/2023 6/27/2023 11:59:59 PM   Final    Color, UA 06/24/2023 Yellow  Yellow, Straw Final    Appearance, UA 06/24/2023 Cloudy (A)  Clear Final    pH, UA 06/24/2023 7.5  5.0 - 8.0 Final    Specific Gravity, UA 06/24/2023 1.010  1.005 - 1.030 Final    Glucose, UA 06/24/2023 Negative  Negative Final    Ketones, UA 06/24/2023  Negative  Negative Final    Bilirubin, UA 06/24/2023 Negative  Negative Final    Blood, UA 06/24/2023 Negative  Negative Final    Protein, UA 06/24/2023 Negative  Negative Final    Leuk Esterase, UA 06/24/2023 Moderate (2+) (A)  Negative Final    Nitrite, UA 06/24/2023 Negative  Negative Final    Urobilinogen, UA 06/24/2023 1.0 E.U./dL  0.2 - 1.0 E.U./dL Final    THC, Screen, Urine 06/24/2023 Negative  Negative Final    Phencyclidine (PCP), Urine 06/24/2023 Negative  Negative Final    Cocaine Screen, Urine 06/24/2023 Negative  Negative Final    Methamphetamine, Ur 06/24/2023 Negative  Negative Final    Opiate Screen 06/24/2023 Negative  Negative Final    Amphetamine Screen, Urine 06/24/2023 Negative  Negative Final    Benzodiazepine Screen, Urine 06/24/2023 Negative  Negative Final    Tricyclic Antidepressants Screen 06/24/2023 Negative  Negative Final    Methadone Screen, Urine 06/24/2023 Negative  Negative Final    Barbiturates Screen, Urine 06/24/2023 Negative  Negative Final    Oxycodone Screen, Urine 06/24/2023 Negative  Negative Final    Propoxyphene Screen 06/24/2023 Negative  Negative Final    Buprenorphine, Screen, Urine 06/24/2023 Negative  Negative Final    Fentanyl, Urine 06/24/2023 Negative  Negative Final    RBC, UA 06/24/2023 0-2  None Seen, 0-2 /HPF Final    WBC, UA 06/24/2023 31-50 (A)  None Seen, 0-2 /HPF Final    Bacteria, UA 06/24/2023 3+ (A)  None Seen /HPF Final    Squamous Epithelial Cells, UA 06/24/2023 7-12 (A)  None Seen, 0-2 /HPF Final    Hyaline Casts, UA 06/24/2023 None Seen  None Seen /LPF Final    Methodology 06/24/2023 Automated Microscopy   Final    Urine Culture 06/24/2023 >100,000 CFU/mL Klebsiella pneumoniae ssp pneumoniae (A)   Final    Urine Culture 06/24/2023 25,000 CFU/mL Streptococcus, Alpha Hemolytic (A)   Final    Streptococcus, Alpha Hemolytic: Based on laboratory diagnosis criteria, these organisms are common urogenital commensal aruna and have not been associated with  urinary tract infections; clinical correlation is recommended.    Residual RhIG Detected 06/24/2023 RESIDUAL RHIG DETECTED   Final         Assessment & Plan   Diagnoses and all orders for this visit:    1. Bipolar II disorder (Primary)  -     ARIPiprazole (Abilify) 5 MG tablet; Take 1 tablet by mouth Daily.  Dispense: 30 tablet; Refill: 0    2. Panic anxiety syndrome  -     hydrOXYzine pamoate (Vistaril) 25 MG capsule; Take 1-2 caps by mouth twice daily as needed for anxiety/panic  Dispense: 120 capsule; Refill: 0    3. Sleep disturbance  -     hydrOXYzine pamoate (Vistaril) 25 MG capsule; Take 1-2 caps by mouth twice daily as needed for anxiety/panic  Dispense: 120 capsule; Refill: 0    4. Medication management  -     ARIPiprazole (Abilify) 5 MG tablet; Take 1 tablet by mouth Daily.  Dispense: 30 tablet; Refill: 0  -     hydrOXYzine pamoate (Vistaril) 25 MG capsule; Take 1-2 caps by mouth twice daily as needed for anxiety/panic  Dispense: 120 capsule; Refill: 0          Visit Diagnoses:    ICD-10-CM ICD-9-CM   1. Bipolar II disorder  F31.81 296.89   2. Panic anxiety syndrome  F41.0 300.01   3. Sleep disturbance  G47.9 780.50   4. Medication management  Z79.899 V58.69     Abilify 5 mg daily initiated as well as Vistaril 25 to 50 mg twice daily as needed for panic. Patient is educated upon risks versus benefits as well as side effects and when to seek care in an emergency setting.  Patient verbalized understanding. Labs ordered. Counseling encouraged-appt scheduled. Follow up in 2 weeks or sooner if needed.    GOALS:  Short Term Goals: Patient will be compliant with medication, and patient will have no significant medication related side effects.  Patient will be engaged in psychotherapy as indicated.  Patient will report subjective improvement of symptoms.  Long term goals: To stabilize mood and treat/improve subjective symptoms, the patient will stay out of the hospital, the patient will be at an optimal level of  functioning, and the patient will take all medications as prescribed.  The patient/guardian verbalized understanding and agreement with goals that were mutually set.        TREATMENT PLAN: Continue supportive psychotherapy efforts and medications as indicated.  Pharmacological and Non-Pharmacological treatment options discussed during today's visit. Patient/Guardian acknowledged and verbally consented with current treatment plan and was educated on the importance of compliance with treatment and follow-up appointments.      MEDICATION ISSUES:  Discussed medication options and treatment plan of prescribed medication as well as the risks, benefits, any black box warnings, and side effects including potential falls, possible impaired driving, and metabolic adversities among others. Patient is agreeable to call the office with any worsening of symptoms or onset of side effects, or if any concerns or questions arise.  The contact information for the office is made available to the patient. Patient is agreeable to call 911 or go to the nearest ER should they begin having any SI/HI, or if any urgent concerns arise. No medication side effects or related complaints today.     MEDS ORDERED DURING VISIT:  New Medications Ordered This Visit   Medications    ARIPiprazole (Abilify) 5 MG tablet     Sig: Take 1 tablet by mouth Daily.     Dispense:  30 tablet     Refill:  0    hydrOXYzine pamoate (Vistaril) 25 MG capsule     Sig: Take 1-2 caps by mouth twice daily as needed for anxiety/panic     Dispense:  120 capsule     Refill:  0       Follow Up Appointment:   Return in about 4 weeks (around 1/8/2024) for Recheck.              This document has been electronically signed by MOR Martin  December 11, 2023 15:09 EST  Some of the data in this electronic note has been brought forward from a previous encounter, any necessary changes have been made, it has been reviewed by this APRN, and it is accurate.    Some of the data  in this electronic note has been brought forward from a previous encounter, any necessary changes have been made, it has been reviewed by this APRN, and it is accurate.     Total Time spent by this APRN for today's encounter:  43 total minutes were spent by this APRN on charting/documentation, review of past medical records over the past year, direct face to face patient care, coordination of care, and providing education.       Dictated Utilizing Dragon Dictation: Part of this note may be an electronic transcription/translation of spoken language to printed text using the Dragon Dictation System.

## 2023-12-11 NOTE — TREATMENT PLAN
Multi-Disciplinary Problems (from Behavioral Health Treatment Plan)      Active Problems       Problem: Anxiety  Start Date: 12/11/23      Problem Details: The patient self-scales this problem as a 7 with 10 being the worst.          Goal Priority Start Date Expected End Date End Date    Patient will develop and implement behavioral and cognitive strategies to reduce anxiety and irrational fears. -- 12/11/23 -- --    Goal Details: Progress toward goal:  Not appropriate to rate progress toward goal since this is the initial treatment plan.          Goal Intervention Frequency Start Date End Date    Help patient explore past emotional issues in relation to present anxiety. PRN 12/11/23 --    Intervention Details: Duration of treatment until until discharged.          Goal Intervention Frequency Start Date End Date    Help patient develop an awareness of their cognitive and physical responses to anxiety. PRN 12/11/23 --    Intervention Details: Duration of treatment until until discharged.                  Problem: Depression  Start Date: 12/11/23      Problem Details: The patient self-scales this problem as a 5 with 10 being the worst.          Goal Priority Start Date Expected End Date End Date    Patient will demonstrate the ability to initiate new constructive life skills outside of sessions on a consistent basis. -- 12/11/23 -- --    Goal Details: Progress toward goal:  Not appropriate to rate progress toward goal since this is the initial treatment plan.          Goal Intervention Frequency Start Date End Date    Assist patient in setting attainable activities of daily living goals. PRN 12/11/23 --      Goal Intervention Frequency Start Date End Date    Provide education about depression PRN 12/11/23 --    Intervention Details: Duration of treatment until until discharged.          Goal Intervention Frequency Start Date End Date    Assist patient in developing healthy coping strategies. PRN 12/11/23 --     Intervention Details: Duration of treatment until until discharged.                  Problem: Mood Instability  Start Date: 12/11/23      Problem Details: The patient self-scales this problem as a 10 with 10 being the worst.          Goal Priority Start Date Expected End Date End Date    Patient will achieve mood stability as evidenced by controlled behavior and a more deliberate thought process -- 12/11/23 -- --    Goal Details: Progress toward goal:  Not appropriate to rate progress toward goal since this is the initial treatment plan.          Goal Intervention Frequency Start Date End Date    Provide structure and focus to patient's thoughts and actions by establishing plans and routine. PRN 12/11/23 --    Intervention Details: Duration of treatment until until discharged.          Goal Intervention Frequency Start Date End Date    Assist patient in setting responsible goals and limits in behavior. PRN 12/11/23 --    Intervention Details: Duration of treatment until until discharged.                               I have discussed and reviewed this treatment plan with the patient and/or guardian.  The patient has verbally agreed with this treatment plan (no signatures are obtained at today's visit as the patient is a telehealth patient and is unable to print and sign this document, therefore verbal agreement is obtained).

## 2023-12-13 ENCOUNTER — TELEMEDICINE (OUTPATIENT)
Dept: PSYCHIATRY | Facility: CLINIC | Age: 23
End: 2023-12-13
Payer: COMMERCIAL

## 2023-12-13 DIAGNOSIS — F33.1 MODERATE EPISODE OF RECURRENT MAJOR DEPRESSIVE DISORDER: ICD-10-CM

## 2023-12-13 DIAGNOSIS — F41.1 GENERALIZED ANXIETY DISORDER: Primary | ICD-10-CM
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
## 2024-01-03 ENCOUNTER — TELEMEDICINE (OUTPATIENT)
Dept: PSYCHIATRY | Facility: CLINIC | Age: 24
End: 2024-01-03
Payer: COMMERCIAL

## 2024-01-03 DIAGNOSIS — F33.1 MODERATE EPISODE OF RECURRENT MAJOR DEPRESSIVE DISORDER: ICD-10-CM

## 2024-01-03 DIAGNOSIS — F41.1 GENERALIZED ANXIETY DISORDER: Primary | ICD-10-CM

## 2024-01-03 PROCEDURE — 90834 PSYTX W PT 45 MINUTES: CPT | Performed by: COUNSELOR
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
## 2024-01-15 DIAGNOSIS — F31.81 BIPOLAR II DISORDER: ICD-10-CM

## 2024-01-15 DIAGNOSIS — Z79.899 MEDICATION MANAGEMENT: ICD-10-CM

## 2024-01-15 RX ORDER — ARIPIPRAZOLE 5 MG/1
5 TABLET ORAL DAILY
Qty: 30 TABLET | Refills: 0 | Status: SHIPPED | OUTPATIENT
Start: 2024-01-15 | End: 2024-01-16 | Stop reason: SDUPTHER

## 2024-01-16 ENCOUNTER — TELEMEDICINE (OUTPATIENT)
Dept: PSYCHIATRY | Facility: CLINIC | Age: 24
End: 2024-01-16
Payer: COMMERCIAL

## 2024-01-16 DIAGNOSIS — G47.9 SLEEP DISTURBANCE: ICD-10-CM

## 2024-01-16 DIAGNOSIS — F41.1 GENERALIZED ANXIETY DISORDER: ICD-10-CM

## 2024-01-16 DIAGNOSIS — F31.81 BIPOLAR II DISORDER: Primary | ICD-10-CM

## 2024-01-16 DIAGNOSIS — Z79.899 MEDICATION MANAGEMENT: ICD-10-CM

## 2024-01-16 PROCEDURE — 1159F MED LIST DOCD IN RCRD: CPT

## 2024-01-16 PROCEDURE — 99214 OFFICE O/P EST MOD 30 MIN: CPT

## 2024-01-16 PROCEDURE — 1160F RVW MEDS BY RX/DR IN RCRD: CPT

## 2024-01-16 RX ORDER — ARIPIPRAZOLE 10 MG/1
TABLET ORAL
Qty: 30 TABLET | Refills: 0 | Status: SHIPPED | OUTPATIENT
Start: 2024-01-16

## 2024-01-16 RX ORDER — MIRTAZAPINE 15 MG/1
15 TABLET, FILM COATED ORAL NIGHTLY
Qty: 30 TABLET | Refills: 0 | Status: SHIPPED | OUTPATIENT
Start: 2024-01-16

## 2024-01-16 NOTE — PROGRESS NOTES
"This provider is located at the [facility] (through [facility]), [address], [place], [address] using a secure video visit through [employer]. The patient's condition being consulted/diagnosed/treated is appropriate for telemedicine. The provider identified herself as well as her credentials.   The patient, and/or patients guardian, consent to be seen remotely, and when consent is given they understand that the consent allows for patient identifiable information to be sent to a third party as needed. Patient is being seen remotely via telehealth at their home address in [place], and stated they are in a secure environment for this session. They may refuse to be seen remotely at any time. The electronic data is encrypted and password protected, and the patient and/or guardian has been advised of the potential risks to privacy not withstanding such measures.   The use of a video visit has been reviewed with the patient and verbal informed consent has been obtained.   Patient identifiers used: Name and .    Subjective   Bianca Nieves is a 23 y.o. female who presents today for follow up    Chief Complaint:    Chief Complaint   Patient presents with    Anxiety    Depression    Med Management    Irritable    Sleeping Problem    Manic Behavior        History of Present Illness:    History of Present Illness  Patient is a 23-year-old female presenting for a 1 month follow-up related to mood lability, hypomania, sleep disturbance, depression, anxiety, and for medication management.  Patient tolerated initiation of Abilify well \"I was doing good and my mood started coming back.  I was not so snappy and not as emotional but I am still emotional.\"  Patient cites tearful episodes current periods.  She is currently on her menstrual cycle.  She states she has been out of Abilify for approximately 4 days and due to\" in the letter she is not able to receive until later this week.  She " "is going to attempt to go out today to get it.  Has utilized Vistaril 50 mg a few times \"does what it needs to it calms me down.\"  Continues to suffer from a poor appetite.  States she has moments of suffering from concentration deficits and \"I cannot sit down, I cannot get tasks done.\"  Regarding depression \"I have been wanting to do more lately.\"  PHQ reduced from 19-12, indicating moderate depression.  KHOI reduced from 21-11 indicating moderate anxiety.  She acknowledges both conditions as somewhat problematic.  Continues to suffer from sleep disturbances, cites 2 to 3 hours of sleep nightly \"I cannot get my mind to shut off, feel like I should clean or get the coffee pot ready for the next day.\"  She denies SI, HI, SIB, or hallucinations currently and is convincing.  Has not recently experienced intrusive thoughts.    Patient now resides in a house with her 4 young children and now  ( last month) who is the father of her youngest 2 children.  They plan to have a ceremony in March.  She is not breast-feeding. 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  Denies alcohol or drug use. She is on a break from school in medical billing, hopeful to resume next year. Currently in counseling.       Last Menstrual Period: current-tube removed    The patient denies any chance of pregnancy at this time.  The patient was educated that her prescribed medications can have potential risk to a developing fetus. The patient is advised to contact this APRN/this office if she becomes pregnant or plans to become pregnant.  Pt verbalizes understanding and acknowledged agreement with this plan in her own words.        The following portions of the patient's history were reviewed and updated as appropriate: allergies, current medications, past family history, past medical history, past social history, past surgical history and problem list.    Past Psychiatric " History:  Began Treatment:   Diagnoses: Bipolar II  Psychiatrist:Mariano  Therapist:Wenceslaoies  Admission History:Denies  Medication Trials: paxil, hydroxyzine, zoloft, trazodone, lexapro 10 (didn't work)  Self Harm:  last week  Suicide Attempts:Denies   Psychosis, Anxiety, Depression:  depression and anxiety, denies psychosis    Past Medical History:  Past Medical History:   Diagnosis Date    Anxiety     Depression     H/O seasonal allergies     PPH (postpartum hemorrhage)     Psoriasis     Urinary tract infection        Substance Abuse History:   Types:Denies all, including illicit  Withdrawal Symptoms:Denies  Longest Period Sober:Not Applicable   AA: Not applicable     Social History:  Social History     Socioeconomic History    Marital status: Significant Other     Spouse name: Bill David    Number of children: 3    Highest education level: High school graduate   Tobacco Use    Smoking status: Former     Packs/day: 0.75     Years: 4.00     Additional pack years: 0.00     Total pack years: 3.00     Types: Cigarettes     Passive exposure: Never    Smokeless tobacco: Never    Tobacco comments:     PT STATES IS AROUND SMOKING EVERY DAY   Vaping Use    Vaping Use: Some days    Substances: Nicotine, Flavoring    Devices: Disposable   Substance and Sexual Activity    Alcohol use: Yes     Comment: occasionally    Drug use: No    Sexual activity: Defer     Partners: Male     Birth control/protection: None       Family History:  Family History   Problem Relation Age of Onset    No Known Problems Mother     Alcohol abuse Father     Diabetes Father     Asthma Maternal Grandmother     Diabetes Paternal Grandmother     Asthma Paternal Grandmother        Past Surgical History:  Past Surgical History:   Procedure Laterality Date    DILATATION AND CURETTAGE N/A 2017    Procedure: DILATATION AND CURETTAGE;  Surgeon: Juan Pablo Wood MD;  Location: [facility];  Service:     HEAD/NECK LESION/CYST EXCISION N/A  2020    Procedure: FACIAL LESION/CYST EXCISION;  Surgeon: Jay Cohen MD;  Location: [facility] OR;  Service: General;  Laterality: N/A;    SALPINGECTOMY Bilateral 10/26/2023    Procedure: SALPINGECTOMY LAPAROSCOPIC;  Surgeon: Crystal Hoyos DO;  Location: [facility] OR;  Service: Obstetrics/Gynecology;  Laterality: Bilateral;       Problem List:  Patient Active Problem List   Diagnosis    Right upper quadrant abdominal pain    Leukocytosis    Abdominal pain affecting pregnancy    Pregnancy    Abdominal pain during pregnancy    Visit for wound check    UTI (urinary tract infection)     labor    Threatened  labor, third trimester    33 weeks gestation of pregnancy    Normal labor     labor in third trimester    37 weeks gestation of pregnancy    Generalized anxiety disorder    Abdominal pain during pregnancy in third trimester    Uterine cramping     contractions    Uterine contractions       Allergy:   No Known Allergies     Current Medications:   Current Outpatient Medications   Medication Sig Dispense Refill    ARIPiprazole (Abilify) 10 MG tablet Take 0.5 tab by mouth daily x 7 days then increase to 1 tab by mouth daily. 30 tablet 0    hydrOXYzine pamoate (Vistaril) 25 MG capsule Take 1-2 caps by mouth twice daily as needed for anxiety/panic 120 capsule 0    ibuprofen (ADVIL,MOTRIN) 600 MG tablet Take 1 tablet by mouth Every 6 (Six) Hours As Needed for Mild Pain. 60 tablet 0    mirtazapine (Remeron) 15 MG tablet Take 1 tablet by mouth Every Night. 30 tablet 0    Multiple Vitamins-Minerals (Multivitamin Gummies Adult) chewable tablet Chew 1 tablet/day.       No current facility-administered medications for this visit.       Review of Systems:    Review of Systems   Constitutional:  Positive for appetite change.   HENT: Negative.     Eyes: Negative.    Respiratory: Negative.     Cardiovascular: Negative.    Gastrointestinal: Negative.    Endocrine: Negative.     Genitourinary: Negative.    Musculoskeletal: Negative.    Skin:  Positive for rash.        Legs, elbow, eyebrow   Allergic/Immunologic: Negative.    Neurological:  Negative for seizures.   Hematological: Negative.    Psychiatric/Behavioral:  Positive for dysphoric mood, sleep disturbance, depressed mood and stress. The patient is nervous/anxious.    All other systems reviewed and are negative.        Physical Exam:   Physical Exam  Constitutional:       Appearance: Normal appearance.   HENT:      Head: Normocephalic.      Nose: Nose normal.   Pulmonary:      Effort: Pulmonary effort is normal.   Musculoskeletal:         General: Normal range of motion.      Cervical back: Normal range of motion.   Skin:     Findings: Bruising present.      Comments: Bruise to lower left arm from self-inflicted injury. No longer open. Educated upon signs of infection.    Neurological:      Mental Status: She is alert.   Psychiatric:         Attention and Perception: Attention normal.         Mood and Affect: Mood is anxious and depressed. Affect is flat.         Speech: Speech normal.         Behavior: Behavior is cooperative.         Thought Content: Thought content normal.         Cognition and Memory: Cognition normal.         Judgment: Judgment normal.         Vitals:  not currently breastfeeding. There is no height or weight on file to calculate BMI.  Due to extenuating circumstances and possible current health risks associated with the patient being present in a clinical setting (with current health restrictions in place in regards to possible COVID 19 transmission/exposure), the patient was seen remotely today via a MyChart Video Visit through [facility].  Unable to obtain vital signs due to nature of remote visit.  Height stated at 65 inches.  Weight stated at 185 pounds.    Last 3 Blood Pressure Readings:  BP Readings from Last 3 Encounters:   10/26/23 118/65   08/12/23 118/69   08/10/23 126/60       PHQ-9 Score:   PHQ-9 Total  Score:   PHQ-9 Depression Screening  Little interest or pleasure in doing things? 1-->several days   Feeling down, depressed, or hopeless? 1-->several days   Trouble falling or staying asleep, or sleeping too much? 0-->not at all   Feeling tired or having little energy? 0-->not at all   Poor appetite or overeating? 3-->nearly every day   Feeling bad about yourself - or that you are a failure or have let yourself or your family down? 3-->nearly every day   Trouble concentrating on things, such as reading the newspaper or watching television? 3-->nearly every day   Moving or speaking so slowly that other people could have noticed? Or the opposite - being so fidgety or restless that you have been moving around a lot more than usual? 1-->several days   Thoughts that you would be better off dead, or of hurting yourself in some way? 0-->not at all   PHQ-9 Total Score 12   If you checked off any problems, how difficult have these problems made it for you to do your work, take care of things at home, or get along with other people? somewhat difficult         KHOI-7 Score:   Feeling nervous, anxious or on edge: Several days  Not being able to stop or control worrying: Not at all  Worrying too much about different things: Nearly every day  Trouble Relaxing: Nearly every day  Being so restless that it is hard to sit still: Nearly every day  Feeling afraid as if something awful might happen: Not at all  Becoming easily annoyed or irritable: Several days  KHOI 7 Total Score: 11  If you checked any problems, how difficult have these problems made it for you to do your work, take care of things at home, or get along with other people: Somewhat difficult               Mental Status Exam:   Hygiene:   fair  Cooperation:  Cooperative  Eye Contact:  Good  Psychomotor Behavior:  Appropriate  Affect:  Blunted  Mood: anxious  Hopelessness: 4  Speech:  Normal  Thought Process:  Goal directed and Linear  Thought Content:  Mood  "congruent  Suicidal:  None and some previously, not x 1 month  Homicidal:  None  Hallucinations:  None  Delusion:  None  Memory:  Intact  Orientation:  Grossly intact  Reliability:  good  Insight:  Fair  Judgement:  Fair  Impulse Control:  Fair  Physical/Medical Issues:  No        Lab Results:   Admission on 10/26/2023, Discharged on 10/26/2023   Component Date Value Ref Range Status    Reference Lab Report 10/26/2023    Final                    Value:[facility]  [address] [place] [address]  Phone: [phone] or [phone]  Fax: [phone]  Blade Clarke M.D., Medical Director    PATIENT NAME                           LABORATORY NO.  [id number]  JASMIN VENTURA                       [id number]  [id number]                         AGE              SEX  SSN           CLIENT REF #  [facility]              23      2000  F    [id number]   [id number]    [address]                     REQUESTING M.D.     ATTENDING [place]ad[place] [address]                   CORETTA CHAMPION  DATE COLLECTED      DATE RECEIVED      DATE REPORTED  10/26/2023          10/26/2023         10/30/2023    DIAGNOSIS:  FALLOPIAN TUBE, EXCISION, BILATERAL:  Benign bilateral fallopian tubes with no significant diagnostic alterations    CAM    CLINICAL HISTORY:  Preop examination    SPECIMENS RECEIVED:  FALLOPIAN TUBE, EXCISION, BILATERAL    MICROSCOPIC DESCRIPTION:  Tissue blocks are prepared                           and slides are examined microscopically. See  diagnosis for details.    Professional interpretation rendered by Silva Monroy M.D., F.C.A.P. at  [employer], [address], [place] [address].    GROSS DESCRIPTION:  Received in formalin labeled \"bilateral fallopian tubes\" are 2 intact and  unoriented fallopian tubes.  Fallopian tube #1 measures 6.2 cm in length and 0.7  cm in diameter.  The serosal surface is tan-purple and smooth with a fimbriated  end.  " The fallopian tube is serially sectioned to reveal a pinpoint lumen.  Fallopian tube #2 measures 5.1 cm in length and 0.7 cm in diameter.  The serosal  surface is tan-purple and smooth with a fimbriated end.  The fallopian tube is  serially sectioned to reveal a pinpoint lumen.    Representative sections of the specimen are submitted as follows:  A1 fallopian tube #1 with entire fimbriated end  A2 fallopian tube #2 with entire fimbriated end.  [place]    REVIEWED, DIAGNOSED AND ELECTRONICALLY  SIGNED BY:    Silva Monroy M.D.,                           F.C.A.P.  CPT CODES:  [id number]     Pre-Admission Testing on 10/24/2023   Component Date Value Ref Range Status    ABO Type 10/24/2023 O   Final    RH type 10/24/2023 Negative   Final    Antibody Screen 10/24/2023 Positive   Final    T&S Expiration Date 10/24/2023 10/27/2023 11:59:59 PM   Final    HCG Qualitative 10/24/2023 Negative  Negative Final    WBC 10/24/2023 8.40  3.40 - 10.80 10*3/mm3 Final    RBC 10/24/2023 4.56  3.77 - 5.28 10*6/mm3 Final    Hemoglobin 10/24/2023 12.9  12.0 - 15.9 g/dL Final    Hematocrit 10/24/2023 38.9  34.0 - 46.6 % Final    MCV 10/24/2023 85.3  79.0 - 97.0 fL Final    MCH 10/24/2023 28.3  26.6 - 33.0 pg Final    MCHC 10/24/2023 33.2  31.5 - 35.7 g/dL Final    RDW 10/24/2023 12.8  12.3 - 15.4 % Final    RDW-SD 10/24/2023 39.5  37.0 - 54.0 fl Final    MPV 10/24/2023 11.3  6.0 - 12.0 fL Final    Platelets 10/24/2023 187  140 - 450 10*3/mm3 Final    Neutrophil % 10/24/2023 60.5  42.7 - 76.0 % Final    Lymphocyte % 10/24/2023 32.6  19.6 - 45.3 % Final    Monocyte % 10/24/2023 3.9 (L)  5.0 - 12.0 % Final    Eosinophil % 10/24/2023 2.1  0.3 - 6.2 % Final    Basophil % 10/24/2023 0.5  0.0 - 1.5 % Final    Immature Grans % 10/24/2023 0.4  0.0 - 0.5 % Final    Neutrophils, Absolute 10/24/2023 5.08  1.70 - 7.00 10*3/mm3 Final    Lymphocytes, Absolute 10/24/2023 2.74  0.70 - 3.10 10*3/mm3 Final    Monocytes, Absolute 10/24/2023 0.33  0.10 - 0.90  10*3/mm3 Final    Eosinophils, Absolute 10/24/2023 0.18  0.00 - 0.40 10*3/mm3 Final    Basophils, Absolute 10/24/2023 0.04  0.00 - 0.20 10*3/mm3 Final    Immature Grans, Absolute 10/24/2023 0.03  0.00 - 0.05 10*3/mm3 Final    nRBC 10/24/2023 0.0  0.0 - 0.2 /100 WBC Final    Residual RhIG Detected 10/24/2023 RESIDUAL RHIG DETECTED   Final   Admission on 08/09/2023, Discharged on 08/12/2023   Component Date Value Ref Range Status    External Strep Group B Ag 07/27/2023 POS   Final    External Chlamydia Screen 08/04/2023 NEG   Final    External Gonorrhea Screen 08/04/2023 NEG   Final    External GTT 1 Hour 06/09/2023 145   Final    External Hepatitis B Surface Ag 05/24/2023 Negative   Final    External RPR 05/24/2023 Non-Reactive   Final    External Rubella Qual 05/24/2023 Immune   Final    External HIV Antibody 05/24/2023 Non-Reactive   Final    THC, Screen, Urine 08/09/2023 Negative  Negative Final    Phencyclidine (PCP), Urine 08/09/2023 Negative  Negative Final    Cocaine Screen, Urine 08/09/2023 Negative  Negative Final    Methamphetamine, Ur 08/09/2023 Negative  Negative Final    Opiate Screen 08/09/2023 Negative  Negative Final    Amphetamine Screen, Urine 08/09/2023 Negative  Negative Final    Benzodiazepine Screen, Urine 08/09/2023 Negative  Negative Final    Tricyclic Antidepressants Screen 08/09/2023 Negative  Negative Final    Methadone Screen, Urine 08/09/2023 Negative  Negative Final    Barbiturates Screen, Urine 08/09/2023 Negative  Negative Final    Oxycodone Screen, Urine 08/09/2023 Negative  Negative Final    Propoxyphene Screen 08/09/2023 Negative  Negative Final    Buprenorphine, Screen, Urine 08/09/2023 Negative  Negative Final    Fentanyl, Urine 08/09/2023 Negative  Negative Final    ABO Type 08/09/2023 O   Final    RH type 08/09/2023 Negative   Final    Antibody Screen 08/09/2023 Positive   Final    T&S Expiration Date 08/09/2023 8/12/2023 11:59:59 PM   Final    WBC 08/09/2023 11.99 (H)  3.40 -  10.80 10*3/mm3 Final    RBC 08/09/2023 3.79  3.77 - 5.28 10*6/mm3 Final    Hemoglobin 08/09/2023 11.1 (L)  12.0 - 15.9 g/dL Final    Hematocrit 08/09/2023 33.7 (L)  34.0 - 46.6 % Final    MCV 08/09/2023 88.9  79.0 - 97.0 fL Final    MCH 08/09/2023 29.3  26.6 - 33.0 pg Final    MCHC 08/09/2023 32.9  31.5 - 35.7 g/dL Final    RDW 08/09/2023 14.0  12.3 - 15.4 % Final    RDW-SD 08/09/2023 45.0  37.0 - 54.0 fl Final    MPV 08/09/2023 10.6  6.0 - 12.0 fL Final    Platelets 08/09/2023 173  140 - 450 10*3/mm3 Final    Neutrophil % 08/09/2023 68.9  42.7 - 76.0 % Final    Lymphocyte % 08/09/2023 24.9  19.6 - 45.3 % Final    Monocyte % 08/09/2023 4.2 (L)  5.0 - 12.0 % Final    Eosinophil % 08/09/2023 1.1  0.3 - 6.2 % Final    Basophil % 08/09/2023 0.3  0.0 - 1.5 % Final    Immature Grans % 08/09/2023 0.6 (H)  0.0 - 0.5 % Final    Neutrophils, Absolute 08/09/2023 8.26 (H)  1.70 - 7.00 10*3/mm3 Final    Lymphocytes, Absolute 08/09/2023 2.99  0.70 - 3.10 10*3/mm3 Final    Monocytes, Absolute 08/09/2023 0.50  0.10 - 0.90 10*3/mm3 Final    Eosinophils, Absolute 08/09/2023 0.13  0.00 - 0.40 10*3/mm3 Final    Basophils, Absolute 08/09/2023 0.04  0.00 - 0.20 10*3/mm3 Final    Immature Grans, Absolute 08/09/2023 0.07 (H)  0.00 - 0.05 10*3/mm3 Final    nRBC 08/09/2023 0.0  0.0 - 0.2 /100 WBC Final    Residual RhIG Detected 08/09/2023 RESIDUAL RHIG DETECTED   Final    WBC 08/11/2023 10.39  3.40 - 10.80 10*3/mm3 Final    RBC 08/11/2023 3.46 (L)  3.77 - 5.28 10*6/mm3 Final    Hemoglobin 08/11/2023 10.2 (L)  12.0 - 15.9 g/dL Final    Hematocrit 08/11/2023 31.7 (L)  34.0 - 46.6 % Final    MCV 08/11/2023 91.6  79.0 - 97.0 fL Final    MCH 08/11/2023 29.5  26.6 - 33.0 pg Final    MCHC 08/11/2023 32.2  31.5 - 35.7 g/dL Final    RDW 08/11/2023 14.1  12.3 - 15.4 % Final    RDW-SD 08/11/2023 47.7  37.0 - 54.0 fl Final    MPV 08/11/2023 10.9  6.0 - 12.0 fL Final    Platelets 08/11/2023 139 (L)  140 - 450 10*3/mm3 Final    Neutrophil % 08/11/2023  66.8  42.7 - 76.0 % Final    Lymphocyte % 08/11/2023 26.6  19.6 - 45.3 % Final    Monocyte % 08/11/2023 4.6 (L)  5.0 - 12.0 % Final    Eosinophil % 08/11/2023 1.2  0.3 - 6.2 % Final    Basophil % 08/11/2023 0.3  0.0 - 1.5 % Final    Immature Grans % 08/11/2023 0.5  0.0 - 0.5 % Final    Neutrophils, Absolute 08/11/2023 6.95  1.70 - 7.00 10*3/mm3 Final    Lymphocytes, Absolute 08/11/2023 2.76  0.70 - 3.10 10*3/mm3 Final    Monocytes, Absolute 08/11/2023 0.48  0.10 - 0.90 10*3/mm3 Final    Eosinophils, Absolute 08/11/2023 0.12  0.00 - 0.40 10*3/mm3 Final    Basophils, Absolute 08/11/2023 0.03  0.00 - 0.20 10*3/mm3 Final    Immature Grans, Absolute 08/11/2023 0.05  0.00 - 0.05 10*3/mm3 Final    nRBC 08/11/2023 0.0  0.0 - 0.2 /100 WBC Final    ABO Type 08/11/2023 O   Final    RH type 08/11/2023 Negative   Final    Fetal Bleed Screen 08/11/2023 Negative   Final    Number of Doses 08/11/2023 Fetal Screen Negative - Recommend 1 vial of RhIg (A)  RhIg is not Indicated, RhIg is not indicated due to the patient's Rh status Final   Admission on 07/31/2023, Discharged on 07/31/2023   Component Date Value Ref Range Status    WBC 07/31/2023 11.76 (H)  3.40 - 10.80 10*3/mm3 Final    RBC 07/31/2023 3.62 (L)  3.77 - 5.28 10*6/mm3 Final    Hemoglobin 07/31/2023 10.7 (L)  12.0 - 15.9 g/dL Final    Hematocrit 07/31/2023 32.4 (L)  34.0 - 46.6 % Final    MCV 07/31/2023 89.5  79.0 - 97.0 fL Final    MCH 07/31/2023 29.6  26.6 - 33.0 pg Final    MCHC 07/31/2023 33.0  31.5 - 35.7 g/dL Final    RDW 07/31/2023 13.9  12.3 - 15.4 % Final    RDW-SD 07/31/2023 45.3  37.0 - 54.0 fl Final    MPV 07/31/2023 10.7  6.0 - 12.0 fL Final    Platelets 07/31/2023 161  140 - 450 10*3/mm3 Final    Color, UA 07/31/2023 Yellow  Yellow, Straw Final    Appearance, UA 07/31/2023 Clear  Clear Final    pH, UA 07/31/2023 6.5  5.0 - 8.0 Final    Specific Gravity, UA 07/31/2023 1.028  1.005 - 1.030 Final    Glucose, UA 07/31/2023 Negative  Negative Final    Ketones, UA  07/31/2023 Trace (A)  Negative Final    Bilirubin, UA 07/31/2023 Negative  Negative Final    Blood, UA 07/31/2023 Large (3+) (A)  Negative Final    Protein, UA 07/31/2023 Trace (A)  Negative Final    Leuk Esterase, UA 07/31/2023 Small (1+) (A)  Negative Final    Nitrite, UA 07/31/2023 Negative  Negative Final    Urobilinogen, UA 07/31/2023 1.0 E.U./dL  0.2 - 1.0 E.U./dL Final    RBC, UA 07/31/2023 Too Numerous to Count (A)  None Seen, 0-2 /HPF Final    WBC, UA 07/31/2023 21-30 (A)  None Seen, 0-2 /HPF Final    Bacteria, UA 07/31/2023 4+ (A)  None Seen /HPF Final    Squamous Epithelial Cells, UA 07/31/2023 0-2  None Seen, 0-2 /HPF Final    Hyaline Casts, UA 07/31/2023 3-6  None Seen /LPF Final    Methodology 07/31/2023 Automated Microscopy   Final    Urine Culture 07/31/2023 >100,000 CFU/mL Klebsiella pneumoniae ssp pneumoniae (A)   Final         Assessment & Plan   Diagnoses and all orders for this visit:    1. Bipolar II disorder (Primary)  -     ARIPiprazole (Abilify) 10 MG tablet; Take 0.5 tab by mouth daily x 7 days then increase to 1 tab by mouth daily.  Dispense: 30 tablet; Refill: 0    2. Sleep disturbance  -     mirtazapine (Remeron) 15 MG tablet; Take 1 tablet by mouth Every Night.  Dispense: 30 tablet; Refill: 0    3. Generalized anxiety disorder  -     mirtazapine (Remeron) 15 MG tablet; Take 1 tablet by mouth Every Night.  Dispense: 30 tablet; Refill: 0    4. Medication management  -     ARIPiprazole (Abilify) 10 MG tablet; Take 0.5 tab by mouth daily x 7 days then increase to 1 tab by mouth daily.  Dispense: 30 tablet; Refill: 0  -     mirtazapine (Remeron) 15 MG tablet; Take 1 tablet by mouth Every Night.  Dispense: 30 tablet; Refill: 0            Visit Diagnoses:    ICD-10-CM ICD-9-CM   1. Bipolar II disorder  F31.81 296.89   2. Sleep disturbance  G47.9 780.50   3. Generalized anxiety disorder  F41.1 300.02   4. Medication management  Z79.899 V58.69       Patient to take 5 mg of Abilify x 7 days then  increase to 10 mg daily.  Remeron 15 mg nightly initiated.  Instructed to take a half tab if not effective in addressing sleep disturbances.  Continue Vistaril, does not need refills currently. Patient is educated upon risks versus benefits as well as side effects and when to seek care in an emergency setting.  Patient verbalized understanding.  Continues to need labs previously ordered performed.  Continue counseling.  Follow up with this provider in 3 weeks or sooner if needed.    GOALS:  Short Term Goals: Patient will be compliant with medication, and patient will have no significant medication related side effects.  Patient will be engaged in psychotherapy as indicated.  Patient will report subjective improvement of symptoms.  Long term goals: To stabilize mood and treat/improve subjective symptoms, the patient will stay out of the hospital, the patient will be at an optimal level of functioning, and the patient will take all medications as prescribed.  The patient/guardian verbalized understanding and agreement with goals that were mutually set.        TREATMENT PLAN: Continue supportive psychotherapy efforts and medications as indicated.  Pharmacological and Non-Pharmacological treatment options discussed during today's visit. Patient/Guardian acknowledged and verbally consented with current treatment plan and was educated on the importance of compliance with treatment and follow-up appointments.      MEDICATION ISSUES:  Discussed medication options and treatment plan of prescribed medication as well as the risks, benefits, any black box warnings, and side effects including potential falls, possible impaired driving, and metabolic adversities among others. Patient is agreeable to call the office with any worsening of symptoms or onset of side effects, or if any concerns or questions arise.  The contact information for the office is made available to the patient. Patient is agreeable to call 911 or go to the  nearest ER should they begin having any SI/HI, or if any urgent concerns arise. No medication side effects or related complaints today.     MEDS ORDERED DURING VISIT:  New Medications Ordered This Visit   Medications    ARIPiprazole (Abilify) 10 MG tablet     Sig: Take 0.5 tab by mouth daily x 7 days then increase to 1 tab by mouth daily.     Dispense:  30 tablet     Refill:  0    mirtazapine (Remeron) 15 MG tablet     Sig: Take 1 tablet by mouth Every Night.     Dispense:  30 tablet     Refill:  0       Follow Up Appointment:   Return in about 4 weeks (around 2/13/2024) for Recheck.              This document has been electronically signed by MOR Martin  January 16, 2024 09:00 EST  Some of the data in this electronic note has been brought forward from a previous encounter, any necessary changes have been made, it has been reviewed by this APRN, and it is accurate.    Some of the data in this electronic note has been brought forward from a previous encounter, any necessary changes have been made, it has been reviewed by this APRN, and it is accurate.     Dictated Utilizing Dragon Dictation: Part of this note may be an electronic transcription/translation of spoken language to printed text using the Dragon Dictation System.

## 2024-02-08 ENCOUNTER — TELEMEDICINE (OUTPATIENT)
Dept: PSYCHIATRY | Facility: CLINIC | Age: 24
End: 2024-02-08
Payer: COMMERCIAL

## 2024-02-08 DIAGNOSIS — Z79.899 MEDICATION MANAGEMENT: ICD-10-CM

## 2024-02-08 DIAGNOSIS — F31.81 BIPOLAR II DISORDER: Primary | ICD-10-CM

## 2024-02-08 DIAGNOSIS — F41.1 GENERALIZED ANXIETY DISORDER: ICD-10-CM

## 2024-02-08 DIAGNOSIS — G47.9 SLEEP DISTURBANCE: ICD-10-CM

## 2024-02-08 RX ORDER — ARIPIPRAZOLE 10 MG/1
TABLET ORAL
Qty: 30 TABLET | Refills: 0 | Status: SHIPPED | OUTPATIENT
Start: 2024-02-08

## 2024-02-08 RX ORDER — MIRTAZAPINE 15 MG/1
15 TABLET, FILM COATED ORAL NIGHTLY
Qty: 30 TABLET | Refills: 0 | Status: SHIPPED | OUTPATIENT
Start: 2024-02-08

## 2024-02-08 RX ORDER — LAMOTRIGINE 25 MG/1
TABLET ORAL
Qty: 42 TABLET | Refills: 0 | Status: SHIPPED | OUTPATIENT
Start: 2024-02-08

## 2024-02-08 NOTE — PROGRESS NOTES
"This provider is located at the [facility] (through [facility]), [address], [place], [address] using a secure video visit through [email or web address]. The patient's condition being consulted/diagnosed/treated is appropriate for telemedicine. The provider identified herself as well as her credentials.   The patient, and/or patients guardian, consent to be seen remotely, and when consent is given they understand that the consent allows for patient identifiable information to be sent to a third party as needed. Patient is being seen remotely via telehealth at their home address in [place], and stated they are in a secure environment for this session. They may refuse to be seen remotely at any time. The electronic data is encrypted and password protected, and the patient and/or guardian has been advised of the potential risks to privacy not withstanding such measures.   The use of a video visit has been reviewed with the patient and verbal informed consent has been obtained.   Patient identifiers used: Name and .    Subjective   Bianca Nieves is a 23 y.o. female who presents today for follow up    Chief Complaint:    Chief Complaint   Patient presents with    Anxiety    Depression    Med Management    Sleeping Problem    Irritable        History of Present Illness:    History of Present Illness  Patient is a 23-year-old female presenting for a 1 month follow-up related to mood lability, hypomania, sleep disturbance, depression, anxiety, and for medication management.  Patient states she has tolerated increase to abilify well with no side effects, but unfortunately not seen much of a change with depression. Phq increased from 12 to 14, indicating moderate depression which patient rates a 5/10. KHOI increased from 10 to 17, indicating severe anxiety which patient rates a 7/10. Denies symptoms consistent with TDK or EPS. Utilizes Remeron occasionally \"helps me sleep better but not " "all night. I still get up and down.\" Only sleep approximately 3-4 hours nightly. She states last night she was up at 2 am mopping. Has noted improvement in her mood, \"I haven't been as irritable lately, I think that's what the medication helps the most.\" She cites difficulty with anxiety currently \"anxious then I get depressed.\" Mood swings \"not as bad as before, every few days but not drastic.\" She states when she is depressed \"I want to sleep all day, don't want to get out of bed.\" Poor appetite currently \"thought of food makes me sick.\" Panic attacks occurring 1-2 times per week \"everything weighing on me and I freak out.\" Denies intrusive thoughts. Denies SI, HI, SIB or hallucinations currently and is convincing.     Patient now resides in a house with her 4 young children and  who is the father of her youngest 2 children. They just moved to [place] recently. They plan to have a wedding ceremony in March.  She is not breast-feeding. 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  Denies alcohol or drug use. She is on a break from school in medical billing, hopeful to resume next year. Currently in counseling.       Last Menstrual Period: 1/16-tube removed    The patient denies any chance of pregnancy at this time.  The patient was educated that her prescribed medications can have potential risk to a developing fetus. The patient is advised to contact this APRN/this office if she becomes pregnant or plans to become pregnant.  Pt verbalizes understanding and acknowledged agreement with this plan in her own words.    The following portions of the patient's history were reviewed and updated as appropriate: allergies, current medications, past family history, past medical history, past social history, past surgical history and problem list.    Past Psychiatric History:  Began Treatment: 2022  Diagnoses: Bipolar " II  Psychiatrist:Denies  Therapist:Denies  Admission History:Denies  Medication Trials: paxil, hydroxyzine, zoloft, trazodone, lexapro 10 (didn't work)  Self Harm:  last week  Suicide Attempts:Denies   Psychosis, Anxiety, Depression:  depression and anxiety, denies psychosis    Past Medical History:  Past Medical History:   Diagnosis Date    Anxiety     Depression     H/O seasonal allergies     PPH (postpartum hemorrhage)     Psoriasis     Urinary tract infection        Substance Abuse History:   Types:Denies all, including illicit  Withdrawal Symptoms:Denies  Longest Period Sober:Not Applicable   AA: Not applicable     Social History:  Social History     Socioeconomic History    Marital status:      Spouse name: Bill David    Number of children: 3    Highest education level: High school graduate   Tobacco Use    Smoking status: Former     Packs/day: 0.75     Years: 4.00     Additional pack years: 0.00     Total pack years: 3.00     Types: Cigarettes     Passive exposure: Never    Smokeless tobacco: Never    Tobacco comments:     PT STATES IS AROUND SMOKING EVERY DAY   Vaping Use    Vaping Use: Some days    Substances: Nicotine, Flavoring    Devices: Disposable   Substance and Sexual Activity    Alcohol use: Yes     Comment: occasionally    Drug use: No    Sexual activity: Defer     Partners: Male     Birth control/protection: None       Family History:  Family History   Problem Relation Age of Onset    No Known Problems Mother     Alcohol abuse Father     Diabetes Father     Asthma Maternal Grandmother     Diabetes Paternal Grandmother     Asthma Paternal Grandmother        Past Surgical History:  Past Surgical History:   Procedure Laterality Date    DILATATION AND CURETTAGE N/A 2017    Procedure: DILATATION AND CURETTAGE;  Surgeon: Juan Pablo Wood MD;  Location: [facility];  Service:     HEAD/NECK LESION/CYST EXCISION N/A 2020    Procedure: FACIAL LESION/CYST EXCISION;  Surgeon:  Jay Cohen MD;  Location: [facility] OR;  Service: General;  Laterality: N/A;    SALPINGECTOMY Bilateral 10/26/2023    Procedure: SALPINGECTOMY LAPAROSCOPIC;  Surgeon: Crystal Hoyos DO;  Location: [facility] OR;  Service: Obstetrics/Gynecology;  Laterality: Bilateral;       Problem List:  Patient Active Problem List   Diagnosis    Right upper quadrant abdominal pain    Leukocytosis    Abdominal pain affecting pregnancy    Pregnancy    Abdominal pain during pregnancy    Visit for wound check    UTI (urinary tract infection)     labor    Threatened  labor, third trimester    33 weeks gestation of pregnancy    Normal labor     labor in third trimester    37 weeks gestation of pregnancy    Generalized anxiety disorder    Abdominal pain during pregnancy in third trimester    Uterine cramping     contractions    Uterine contractions       Allergy:   No Known Allergies     Current Medications:   Current Outpatient Medications   Medication Sig Dispense Refill    ARIPiprazole (Abilify) 10 MG tablet Take 1 tab by mouth daily. 30 tablet 0    mirtazapine (Remeron) 15 MG tablet Take 1 tablet by mouth Every Night. 30 tablet 0    hydrOXYzine pamoate (Vistaril) 25 MG capsule Take 1-2 caps by mouth twice daily as needed for anxiety/panic 120 capsule 0    ibuprofen (ADVIL,MOTRIN) 600 MG tablet Take 1 tablet by mouth Every 6 (Six) Hours As Needed for Mild Pain. 60 tablet 0    lamoTRIgine (LaMICtal) 25 MG tablet Take 1 tab by mouth daily x 2 weeks, if well tolerated increase to 1 tab by mouth twice daily. 42 tablet 0    Multiple Vitamins-Minerals (Multivitamin Gummies Adult) chewable tablet Chew 1 tablet/day.       No current facility-administered medications for this visit.       Review of Systems:    Review of Systems   Constitutional:  Positive for appetite change.   HENT: Negative.     Eyes: Negative.    Respiratory: Negative.     Cardiovascular: Negative.    Gastrointestinal: Negative.     Endocrine: Negative.    Genitourinary: Negative.    Musculoskeletal: Negative.    Skin:  Positive for rash.        Legs, elbow, eyebrow   Allergic/Immunologic: Negative.    Neurological:  Negative for seizures.   Hematological: Negative.    Psychiatric/Behavioral:  Positive for dysphoric mood, sleep disturbance and stress. The patient is nervous/anxious.    All other systems reviewed and are negative.        Physical Exam:   Physical Exam  Constitutional:       Appearance: Normal appearance.   HENT:      Head: Normocephalic.      Nose: Nose normal.   Pulmonary:      Effort: Pulmonary effort is normal.   Musculoskeletal:         General: Normal range of motion.      Cervical back: Normal range of motion.   Skin:     Findings: Bruising present.      Comments: Bruise to lower left arm from self-inflicted injury. No longer open. Educated upon signs of infection.    Neurological:      Mental Status: She is alert.   Psychiatric:         Attention and Perception: Attention normal.         Mood and Affect: Mood is anxious and depressed. Affect is flat.         Speech: Speech normal.         Behavior: Behavior is cooperative.         Thought Content: Thought content normal.         Cognition and Memory: Cognition normal.         Judgment: Judgment normal.         Vitals:  not currently breastfeeding. There is no height or weight on file to calculate BMI.  Due to extenuating circumstances and possible current health risks associated with the patient being present in a clinical setting (with current health restrictions in place in regards to possible COVID 19 transmission/exposure), the patient was seen remotely today via a MyChart Video Visit through [facility] [place] [facility].  Unable to obtain vital signs due to nature of remote visit.  Height stated at 65 inches.  Weight stated at 185 pounds.    Last 3 Blood Pressure Readings:  BP Readings from Last 3 Encounters:   10/26/23 118/65   08/12/23 118/69   08/10/23 126/60       PHQ-9 Score:   PHQ-9  Total Score:   PHQ-9 Depression Screening  Little interest or pleasure in doing things? (P) 1-->several days   Feeling down, depressed, or hopeless? (P) 1-->several days   Trouble falling or staying asleep, or sleeping too much? (P) 3-->nearly every day   Feeling tired or having little energy? (P) 3-->nearly every day   Poor appetite or overeating? (P) 3-->nearly every day   Feeling bad about yourself - or that you are a failure or have let yourself or your family down? (P) 1-->several days   Trouble concentrating on things, such as reading the newspaper or watching television? (P) 1-->several days   Moving or speaking so slowly that other people could have noticed? Or the opposite - being so fidgety or restless that you have been moving around a lot more than usual? (P) 1-->several days   Thoughts that you would be better off dead, or of hurting yourself in some way? (P) 0-->not at all   PHQ-9 Total Score (P) 14   If you checked off any problems, how difficult have these problems made it for you to do your work, take care of things at home, or get along with other people? (P) very difficult         KHOI-7 Score:   Feeling nervous, anxious or on edge: (P) More than half the days  Not being able to stop or control worrying: (P) Nearly every day  Worrying too much about different things: (P) Nearly every day  Trouble Relaxing: (P) Nearly every day  Being so restless that it is hard to sit still: (P) Nearly every day  Feeling afraid as if something awful might happen: (P) Several days  Becoming easily annoyed or irritable: (P) More than half the days  KHOI 7 Total Score: (P) 17  If you checked any problems, how difficult have these problems made it for you to do your work, take care of things at home, or get along with other people: (P) Very difficult               Mental Status Exam:   Hygiene:   fair  Cooperation:  Cooperative  Eye Contact:  Good  Psychomotor Behavior:  Appropriate  Affect:  Blunted  Mood:  "anxious  Hopelessness: 3  Speech:  Normal  Thought Process:  Goal directed and Linear  Thought Content:  Mood congruent  Suicidal:  None and some previously, not x 1 month  Homicidal:  None  Hallucinations:  None  Delusion:  None  Memory:  Intact  Orientation:  Grossly intact  Reliability:  good  Insight:  Fair  Judgement:  Fair  Impulse Control:  Fair  Physical/Medical Issues:  No        Lab Results:   Admission on 10/26/2023, Discharged on 10/26/2023   Component Date Value Ref Range Status    Reference Lab Report 10/26/2023    Final                    Value:[facility]  [address] [place] [address]  Phone: [phone] or [phone]  Fax: [phone]  Blade Clarke M.D., Medical Director    PATIENT NAME                           LABORATORY NO.  [id number]  JASMIN VENTURA                       [id number]  [id number]                         AGE              SEX  SSN           CLIENT REF #  [facility]              23      2000  F    [id number]   [id number]    [address]                     REQUESTING M.D.     ATTENDING [place] [address]                   CORETTA CHAMPION  DATE COLLECTED      DATE RECEIVED      DATE REPORTED  10/26/2023          10/26/2023         10/30/2023    DIAGNOSIS:  FALLOPIAN TUBE, EXCISION, BILATERAL:  Benign bilateral fallopian tubes with no significant diagnostic alterations    CAM    CLINICAL HISTORY:  Preop examination    SPECIMENS RECEIVED:  FALLOPIAN TUBE, EXCISION, BILATERAL    MICROSCOPIC DESCRIPTION:  Tissue blocks are prepared                           and slides are examined microscopically. See  diagnosis for details.    Professional interpretation rendered by Silva Monroy M.D., F.C.A.P. at  [facility], [address] [facility] [place] [address].    GROSS DESCRIPTION:  Received in formalin labeled \"bilateral fallopian tubes\" are 2 intact and  unoriented fallopian tubes.  Fallopian tube #1 measures 6.2 " cm in length and 0.7  cm in diameter.  The serosal surface is tan-purple and smooth with a fimbriated  end.  The fallopian tube is serially sectioned to reveal a pinpoint lumen.  Fallopian tube #2 measures 5.1 cm in length and 0.7 cm in diameter.  The serosal  surface is tan-purple and smooth with a fimbriated end.  The fallopian tube is  serially sectioned to reveal a pinpoint lumen.    Representative sections of the specimen are submitted as follows:  A1 fallopian tube #1 with entire fimbriated end  A2 fallopian tube #2 with entire fimbriated end.  [place]    REVIEWED, DIAGNOSED AND ELECTRONICALLY  SIGNED BY:    Silva Monroy M.D.,                           FRadhaC.A.P.  CPT CODES:  92232     Pre-Admission Testing on 10/24/2023   Component Date Value Ref Range Status    ABO Type 10/24/2023 O   Final    RH type 10/24/2023 Negative   Final    Antibody Screen 10/24/2023 Positive   Final    T&S Expiration Date 10/24/2023 10/27/2023 11:59:59 PM   Final    HCG Qualitative 10/24/2023 Negative  Negative Final    WBC 10/24/2023 8.40  3.40 - 10.80 10*3/mm3 Final    RBC 10/24/2023 4.56  3.77 - 5.28 10*6/mm3 Final    Hemoglobin 10/24/2023 12.9  12.0 - 15.9 g/dL Final    Hematocrit 10/24/2023 38.9  34.0 - 46.6 % Final    MCV 10/24/2023 85.3  79.0 - 97.0 fL Final    MCH 10/24/2023 28.3  26.6 - 33.0 pg Final    MCHC 10/24/2023 33.2  31.5 - 35.7 g/dL Final    RDW 10/24/2023 12.8  12.3 - 15.4 % Final    RDW-SD 10/24/2023 39.5  37.0 - 54.0 fl Final    MPV 10/24/2023 11.3  6.0 - 12.0 fL Final    Platelets 10/24/2023 187  140 - 450 10*3/mm3 Final    Neutrophil % 10/24/2023 60.5  42.7 - 76.0 % Final    Lymphocyte % 10/24/2023 32.6  19.6 - 45.3 % Final    Monocyte % 10/24/2023 3.9 (L)  5.0 - 12.0 % Final    Eosinophil % 10/24/2023 2.1  0.3 - 6.2 % Final    Basophil % 10/24/2023 0.5  0.0 - 1.5 % Final    Immature Grans % 10/24/2023 0.4  0.0 - 0.5 % Final    Neutrophils, Absolute 10/24/2023 5.08  1.70 - 7.00 10*3/mm3 Final    Lymphocytes,  Absolute 10/24/2023 2.74  0.70 - 3.10 10*3/mm3 Final    Monocytes, Absolute 10/24/2023 0.33  0.10 - 0.90 10*3/mm3 Final    Eosinophils, Absolute 10/24/2023 0.18  0.00 - 0.40 10*3/mm3 Final    Basophils, Absolute 10/24/2023 0.04  0.00 - 0.20 10*3/mm3 Final    Immature Grans, Absolute 10/24/2023 0.03  0.00 - 0.05 10*3/mm3 Final    nRBC 10/24/2023 0.0  0.0 - 0.2 /100 WBC Final    Residual RhIG Detected 10/24/2023 RESIDUAL RHIG DETECTED   Final   Admission on 08/09/2023, Discharged on 08/12/2023   Component Date Value Ref Range Status    External Strep Group B Ag 07/27/2023 POS   Final    External Chlamydia Screen 08/04/2023 NEG   Final    External Gonorrhea Screen 08/04/2023 NEG   Final    External GTT 1 Hour 06/09/2023 145   Final    External Hepatitis B Surface Ag 05/24/2023 Negative   Final    External RPR 05/24/2023 Non-Reactive   Final    External Rubella Qual 05/24/2023 Immune   Final    External HIV Antibody 05/24/2023 Non-Reactive   Final    THC, Screen, Urine 08/09/2023 Negative  Negative Final    Phencyclidine (PCP), Urine 08/09/2023 Negative  Negative Final    Cocaine Screen, Urine 08/09/2023 Negative  Negative Final    Methamphetamine, Ur 08/09/2023 Negative  Negative Final    Opiate Screen 08/09/2023 Negative  Negative Final    Amphetamine Screen, Urine 08/09/2023 Negative  Negative Final    Benzodiazepine Screen, Urine 08/09/2023 Negative  Negative Final    Tricyclic Antidepressants Screen 08/09/2023 Negative  Negative Final    Methadone Screen, Urine 08/09/2023 Negative  Negative Final    Barbiturates Screen, Urine 08/09/2023 Negative  Negative Final    Oxycodone Screen, Urine 08/09/2023 Negative  Negative Final    Propoxyphene Screen 08/09/2023 Negative  Negative Final    Buprenorphine, Screen, Urine 08/09/2023 Negative  Negative Final    Fentanyl, Urine 08/09/2023 Negative  Negative Final    ABO Type 08/09/2023 O   Final    RH type 08/09/2023 Negative   Final    Antibody Screen 08/09/2023 Positive    Final    T&S Expiration Date 08/09/2023 8/12/2023 11:59:59 PM   Final    WBC 08/09/2023 11.99 (H)  3.40 - 10.80 10*3/mm3 Final    RBC 08/09/2023 3.79  3.77 - 5.28 10*6/mm3 Final    Hemoglobin 08/09/2023 11.1 (L)  12.0 - 15.9 g/dL Final    Hematocrit 08/09/2023 33.7 (L)  34.0 - 46.6 % Final    MCV 08/09/2023 88.9  79.0 - 97.0 fL Final    MCH 08/09/2023 29.3  26.6 - 33.0 pg Final    MCHC 08/09/2023 32.9  31.5 - 35.7 g/dL Final    RDW 08/09/2023 14.0  12.3 - 15.4 % Final    RDW-SD 08/09/2023 45.0  37.0 - 54.0 fl Final    MPV 08/09/2023 10.6  6.0 - 12.0 fL Final    Platelets 08/09/2023 173  140 - 450 10*3/mm3 Final    Neutrophil % 08/09/2023 68.9  42.7 - 76.0 % Final    Lymphocyte % 08/09/2023 24.9  19.6 - 45.3 % Final    Monocyte % 08/09/2023 4.2 (L)  5.0 - 12.0 % Final    Eosinophil % 08/09/2023 1.1  0.3 - 6.2 % Final    Basophil % 08/09/2023 0.3  0.0 - 1.5 % Final    Immature Grans % 08/09/2023 0.6 (H)  0.0 - 0.5 % Final    Neutrophils, Absolute 08/09/2023 8.26 (H)  1.70 - 7.00 10*3/mm3 Final    Lymphocytes, Absolute 08/09/2023 2.99  0.70 - 3.10 10*3/mm3 Final    Monocytes, Absolute 08/09/2023 0.50  0.10 - 0.90 10*3/mm3 Final    Eosinophils, Absolute 08/09/2023 0.13  0.00 - 0.40 10*3/mm3 Final    Basophils, Absolute 08/09/2023 0.04  0.00 - 0.20 10*3/mm3 Final    Immature Grans, Absolute 08/09/2023 0.07 (H)  0.00 - 0.05 10*3/mm3 Final    nRBC 08/09/2023 0.0  0.0 - 0.2 /100 WBC Final    Residual RhIG Detected 08/09/2023 RESIDUAL RHIG DETECTED   Final    WBC 08/11/2023 10.39  3.40 - 10.80 10*3/mm3 Final    RBC 08/11/2023 3.46 (L)  3.77 - 5.28 10*6/mm3 Final    Hemoglobin 08/11/2023 10.2 (L)  12.0 - 15.9 g/dL Final    Hematocrit 08/11/2023 31.7 (L)  34.0 - 46.6 % Final    MCV 08/11/2023 91.6  79.0 - 97.0 fL Final    MCH 08/11/2023 29.5  26.6 - 33.0 pg Final    MCHC 08/11/2023 32.2  31.5 - 35.7 g/dL Final    RDW 08/11/2023 14.1  12.3 - 15.4 % Final    RDW-SD 08/11/2023 47.7  37.0 - 54.0 fl Final    MPV 08/11/2023 10.9   6.0 - 12.0 fL Final    Platelets 08/11/2023 139 (L)  140 - 450 10*3/mm3 Final    Neutrophil % 08/11/2023 66.8  42.7 - 76.0 % Final    Lymphocyte % 08/11/2023 26.6  19.6 - 45.3 % Final    Monocyte % 08/11/2023 4.6 (L)  5.0 - 12.0 % Final    Eosinophil % 08/11/2023 1.2  0.3 - 6.2 % Final    Basophil % 08/11/2023 0.3  0.0 - 1.5 % Final    Immature Grans % 08/11/2023 0.5  0.0 - 0.5 % Final    Neutrophils, Absolute 08/11/2023 6.95  1.70 - 7.00 10*3/mm3 Final    Lymphocytes, Absolute 08/11/2023 2.76  0.70 - 3.10 10*3/mm3 Final    Monocytes, Absolute 08/11/2023 0.48  0.10 - 0.90 10*3/mm3 Final    Eosinophils, Absolute 08/11/2023 0.12  0.00 - 0.40 10*3/mm3 Final    Basophils, Absolute 08/11/2023 0.03  0.00 - 0.20 10*3/mm3 Final    Immature Grans, Absolute 08/11/2023 0.05  0.00 - 0.05 10*3/mm3 Final    nRBC 08/11/2023 0.0  0.0 - 0.2 /100 WBC Final    ABO Type 08/11/2023 O   Final    RH type 08/11/2023 Negative   Final    Fetal Bleed Screen 08/11/2023 Negative   Final    Number of Doses 08/11/2023 Fetal Screen Negative - Recommend 1 vial of RhIg (A)  RhIg is not Indicated, RhIg is not indicated due to the patient's Rh status Final         Assessment & Plan   Diagnoses and all orders for this visit:    1. Bipolar II disorder (Primary)  -     ARIPiprazole (Abilify) 10 MG tablet; Take 1 tab by mouth daily.  Dispense: 30 tablet; Refill: 0  -     lamoTRIgine (LaMICtal) 25 MG tablet; Take 1 tab by mouth daily x 2 weeks, if well tolerated increase to 1 tab by mouth twice daily.  Dispense: 42 tablet; Refill: 0    2. Sleep disturbance  -     mirtazapine (Remeron) 15 MG tablet; Take 1 tablet by mouth Every Night.  Dispense: 30 tablet; Refill: 0    3. Generalized anxiety disorder  -     mirtazapine (Remeron) 15 MG tablet; Take 1 tablet by mouth Every Night.  Dispense: 30 tablet; Refill: 0    4. Medication management  -     ARIPiprazole (Abilify) 10 MG tablet; Take 1 tab by mouth daily.  Dispense: 30 tablet; Refill: 0  -     mirtazapine  (Remeron) 15 MG tablet; Take 1 tablet by mouth Every Night.  Dispense: 30 tablet; Refill: 0  -     lamoTRIgine (LaMICtal) 25 MG tablet; Take 1 tab by mouth daily x 2 weeks, if well tolerated increase to 1 tab by mouth twice daily.  Dispense: 42 tablet; Refill: 0  -     Lipid Panel; Future  -     Comprehensive Metabolic Panel; Future              Visit Diagnoses:    ICD-10-CM ICD-9-CM   1. Bipolar II disorder  F31.81 296.89   2. Sleep disturbance  G47.9 780.50   3. Generalized anxiety disorder  F41.1 300.02   4. Medication management  Z79.899 V58.69       Abilify refilled as well as Remeron, encouraged a half tab every night due to sleep disturbance. She states she does not need Vistaril refilled at this point. Lamictal 25mg daily x 2 weeks then increase to 50 mg daily initiated. Patient is educated upon risks versus benefits as well as side effects and when to seek care in an emergency setting.  Patient verbalized understanding. Patient previously on this medication and tolerated well. Counseling encouraged, encouraged patient to call and reschedule appointment. Labs re-ordered as orders previously placed . Follow up in 4 weeks or sooner if needed.    GOALS:  Short Term Goals: Patient will be compliant with medication, and patient will have no significant medication related side effects.  Patient will be engaged in psychotherapy as indicated.  Patient will report subjective improvement of symptoms.  Long term goals: To stabilize mood and treat/improve subjective symptoms, the patient will stay out of the hospital, the patient will be at an optimal level of functioning, and the patient will take all medications as prescribed.  The patient/guardian verbalized understanding and agreement with goals that were mutually set.        TREATMENT PLAN: Continue supportive psychotherapy efforts and medications as indicated.  Pharmacological and Non-Pharmacological treatment options discussed during today's visit.  Patient/Guardian acknowledged and verbally consented with current treatment plan and was educated on the importance of compliance with treatment and follow-up appointments.      MEDICATION ISSUES:  Discussed medication options and treatment plan of prescribed medication as well as the risks, benefits, any black box warnings, and side effects including potential falls, possible impaired driving, and metabolic adversities among others. Patient is agreeable to call the office with any worsening of symptoms or onset of side effects, or if any concerns or questions arise.  The contact information for the office is made available to the patient. Patient is agreeable to call 911 or go to the nearest ER should they begin having any SI/HI, or if any urgent concerns arise. No medication side effects or related complaints today.     MEDS ORDERED DURING VISIT:  New Medications Ordered This Visit   Medications    ARIPiprazole (Abilify) 10 MG tablet     Sig: Take 1 tab by mouth daily.     Dispense:  30 tablet     Refill:  0    mirtazapine (Remeron) 15 MG tablet     Sig: Take 1 tablet by mouth Every Night.     Dispense:  30 tablet     Refill:  0    lamoTRIgine (LaMICtal) 25 MG tablet     Sig: Take 1 tab by mouth daily x 2 weeks, if well tolerated increase to 1 tab by mouth twice daily.     Dispense:  42 tablet     Refill:  0       Follow Up Appointment:   Return in about 4 weeks (around 3/7/2024) for Recheck.              This document has been electronically signed by MOR Martin  February 8, 2024 09:10 EST  Some of the data in this electronic note has been brought forward from a previous encounter, any necessary changes have been made, it has been reviewed by this APRN, and it is accurate.    Some of the data in this electronic note has been brought forward from a previous encounter, any necessary changes have been made, it has been reviewed by this APRN, and it is accurate.     Dictated Utilizing Dragon Dictation:  Part of this note may be an electronic transcription/translation of spoken language to printed text using the Dragon Dictation System.

## 2024-02-12 DIAGNOSIS — F41.1 GENERALIZED ANXIETY DISORDER: ICD-10-CM

## 2024-02-12 DIAGNOSIS — F31.81 BIPOLAR II DISORDER: ICD-10-CM

## 2024-02-12 DIAGNOSIS — G47.9 SLEEP DISTURBANCE: ICD-10-CM

## 2024-02-12 DIAGNOSIS — Z79.899 MEDICATION MANAGEMENT: ICD-10-CM

## 2024-02-12 RX ORDER — MIRTAZAPINE 15 MG/1
15 TABLET, FILM COATED ORAL NIGHTLY
Qty: 30 TABLET | Refills: 0 | OUTPATIENT
Start: 2024-02-12

## 2024-02-12 RX ORDER — ARIPIPRAZOLE 5 MG/1
5 TABLET ORAL DAILY
Qty: 30 TABLET | Refills: 0 | OUTPATIENT
Start: 2024-02-12

## 2024-02-12 RX ORDER — ARIPIPRAZOLE 10 MG/1
TABLET ORAL
Qty: 30 TABLET | Refills: 0 | OUTPATIENT
Start: 2024-02-12

## 2024-08-07 ENCOUNTER — TELEMEDICINE (OUTPATIENT)
Dept: PSYCHIATRY | Facility: CLINIC | Age: 24
End: 2024-08-07
Payer: COMMERCIAL

## 2024-08-07 DIAGNOSIS — F41.0 PANIC ANXIETY SYNDROME: ICD-10-CM

## 2024-08-07 DIAGNOSIS — F31.81 BIPOLAR II DISORDER: Primary | ICD-10-CM

## 2024-08-07 DIAGNOSIS — Z79.899 MEDICATION MANAGEMENT: ICD-10-CM

## 2024-08-07 RX ORDER — ARIPIPRAZOLE 5 MG/1
TABLET ORAL
Qty: 30 TABLET | Refills: 0 | Status: SHIPPED | OUTPATIENT
Start: 2024-08-07

## 2024-08-07 RX ORDER — PROPRANOLOL HYDROCHLORIDE 10 MG/1
10 TABLET ORAL 2 TIMES DAILY PRN
Qty: 60 TABLET | Refills: 0 | Status: SHIPPED | OUTPATIENT
Start: 2024-08-07

## 2024-08-07 RX ORDER — TRIAMCINOLONE ACETONIDE 5 MG/G
CREAM TOPICAL
COMMUNITY
Start: 2024-07-30

## 2024-08-07 NOTE — PROGRESS NOTES
"This provider is located at the [facility] (through [facility]), [address], [facility], [address] using a secure video visit through [employer]. The patient's condition being consulted/diagnosed/treated is appropriate for telemedicine. The provider identified herself as well as her credentials.   The patient, and/or patients guardian, consent to be seen remotely, and when consent is given they understand that the consent allows for patient identifiable information to be sent to a third party as needed. Patient is being seen remotely via telehealth at their home address in [place], and stated they are in a secure environment for this session. They may refuse to be seen remotely at any time. The electronic data is encrypted and password protected, and the patient and/or guardian has been advised of the potential risks to privacy not withstanding such measures.   The use of a video visit has been reviewed with the patient and verbal informed consent has been obtained.   Patient identifiers used: Name and .    Subjective   Bianca Nieves is a 23 y.o. female who presents today for follow up    Chief Complaint:    Chief Complaint   Patient presents with    Anxiety    Depression    Med Management    Irritable    Manic Behavior    Sleeping Problem        History of Present Illness:    History of Present Illness  Patient is a 23-year-old female presenting for a follow-up related to mood lability, hypomania, sleep disturbance, depression, anxiety, and for medication management.  Last visit February of this year.  She did start Lamictal at that time, believes she tolerated well without side effects.  States she was feeling better at that point.  Due to missed appointments has not had medication in several months now.  She presents to appointment today anxious, tearful.  She states \"now I am super irritable, probably because I am not taking medications.\"  States she is \"sleeping too " "much.\"  Also \"always have intrusive thoughts, I do not go in the store anymore I am terrified of everything.  My family is falling apart.\"  She has utilized Vistaril a few times at 25 mg, states this is somewhat sedating that is beneficial in addressing anxiety.  States over the past month \"I have had no energy, I do not have a want to do anything.  I am constantly in a state of being down.\"  She also acknowledges mood swings \"they are constant.  1 minute I am happy and fine and the next I am raging.\"  Prior to a few weeks ago she did have circumstances of not sleeping at night and \"being up all night cleaning.\"  States Remeron previously was effective in addressing sleep disturbances.  Currently feels \"completely exhausted\" and this is her largest concern today.  PHQ increased from 14-17, indicating moderately severe depression with patient rates in 8/10 on average \"I do not know why.\"  She states that she did feel medications were helping prior to stopping.  KHOI remains 7 score of 17 indicating severe anxiety which patient rates a 10/10 on average.  She denies SI, HI, SIB, or hallucinations currently and is convincing.  Denies medical status changes currently, states she has started semaglutide, unsure of dose.  This is given to her by her PCP.    Patient now resides in a house with her 4 young children and  who is the father of her youngest 2 children. They just moved to [place] recently. They plan to have a wedding ceremony in March.  She is not breast-feeding. 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  Denies alcohol or drug use. She is on a break from school in medical billing, hopeful to resume next year. Currently in counseling.      Last Menstrual Period: 2 weeks ago-tubal ligation    The patient denies any chance of pregnancy at this time.  The patient was educated that her prescribed medications can have potential risk to a developing fetus. " The patient is advised to contact this APRN/this office if she becomes pregnant or plans to become pregnant.  Pt verbalizes understanding and acknowledged agreement with this plan in her own words.    The following portions of the patient's history were reviewed and updated as appropriate: allergies, current medications, past family history, past medical history, past social history, past surgical history and problem list.    Past Psychiatric History:  Began Treatment:   Diagnoses: Bipolar II  Psychiatrist:Denies  Therapist:Denies  Admission History:Denies  Medication Trials: paxil, hydroxyzine, zoloft, trazodone, lexapro 10 (didn't work), vistaril 25 (helped, made tired), remeron 15 (worked-no longer needed)  Self Harm:  last week  Suicide Attempts:Denies   Psychosis, Anxiety, Depression:  depression and anxiety, denies psychosis    Past Medical History:  Past Medical History:   Diagnosis Date    Anxiety     Depression     H/O seasonal allergies     PPH (postpartum hemorrhage)     Psoriasis     Urinary tract infection        Substance Abuse History:   Types:Denies all, including illicit  Withdrawal Symptoms:Denies  Longest Period Sober:Not Applicable   AA: Not applicable     Social History:  Social History     Socioeconomic History    Marital status:      Spouse name: Bill David    Number of children: 3    Highest education level: High school graduate   Tobacco Use    Smoking status: Former     Current packs/day: 0.75     Average packs/day: 0.8 packs/day for 4.0 years (3.0 ttl pk-yrs)     Types: Cigarettes     Passive exposure: Never    Smokeless tobacco: Never    Tobacco comments:     PT STATES IS AROUND SMOKING EVERY DAY   Vaping Use    Vaping status: Some Days    Substances: Nicotine, Flavoring    Devices: Disposable   Substance and Sexual Activity    Alcohol use: Yes     Comment: occasionally    Drug use: No    Sexual activity: Defer     Partners: Male     Birth control/protection: None        Family History:  Family History   Problem Relation Age of Onset    No Known Problems Mother     Alcohol abuse Father     Diabetes Father     Asthma Maternal Grandmother     Diabetes Paternal Grandmother     Asthma Paternal Grandmother        Past Surgical History:  Past Surgical History:   Procedure Laterality Date    DILATATION AND CURETTAGE N/A 2017    Procedure: DILATATION AND CURETTAGE;  Surgeon: Juan Pablo Wood MD;  Location: [facility] OR;  Service:     HEAD/NECK LESION/CYST EXCISION N/A 2020    Procedure: FACIAL LESION/CYST EXCISION;  Surgeon: Jay Cohen MD;  Location: [facility] OR;  Service: General;  Laterality: N/A;    SALPINGECTOMY Bilateral 10/26/2023    Procedure: SALPINGECTOMY LAPAROSCOPIC;  Surgeon: Crystal Hoyos DO;  Location: [facility] OR;  Service: Obstetrics/Gynecology;  Laterality: Bilateral;       Problem List:  Patient Active Problem List   Diagnosis    Right upper quadrant abdominal pain    Leukocytosis    Abdominal pain affecting pregnancy    Pregnancy    Abdominal pain during pregnancy    Visit for wound check    UTI (urinary tract infection)     labor    Threatened  labor, third trimester    33 weeks gestation of pregnancy    Normal labor     labor in third trimester    37 weeks gestation of pregnancy    Generalized anxiety disorder    Abdominal pain during pregnancy in third trimester    Uterine cramping     contractions    Uterine contractions       Allergy:   No Known Allergies     Current Medications:   Current Outpatient Medications   Medication Sig Dispense Refill    ARIPiprazole (Abilify) 5 MG tablet Take 1 tab by mouth daily x 2 weeks, then increase to 2 tabs by mouth daily (together). 30 tablet 0    SEMAGLUTIDE-WEIGHT MANAGEMENT SC Inject  under the skin into the appropriate area as directed.      triamcinolone (KENALOG) 0.5 % cream       ibuprofen (ADVIL,MOTRIN) 600 MG tablet Take 1 tablet by mouth Every 6 (Six) Hours As  Needed for Mild Pain. 60 tablet 0    Multiple Vitamins-Minerals (Multivitamin Gummies Adult) chewable tablet Chew 1 tablet/day.      propranolol (INDERAL) 10 MG tablet Take 1 tablet by mouth 2 (Two) Times a Day As Needed (anxiety). 60 tablet 0     No current facility-administered medications for this visit.       Review of Systems:    Review of Systems   Constitutional:  Positive for fatigue.   HENT: Negative.     Eyes: Negative.    Respiratory: Negative.     Cardiovascular: Negative.    Gastrointestinal: Negative.    Endocrine: Negative.    Genitourinary: Negative.    Musculoskeletal: Negative.    Skin:  Positive for rash.        Legs, elbow, eyebrow   Allergic/Immunologic: Negative.    Neurological:  Negative for seizures.   Hematological: Negative.    Psychiatric/Behavioral:  Positive for agitation, dysphoric mood, depressed mood and stress. The patient is nervous/anxious.    All other systems reviewed and are negative.        Physical Exam:   Physical Exam  Constitutional:       Appearance: Normal appearance.   HENT:      Head: Normocephalic.      Nose: Nose normal.   Pulmonary:      Effort: Pulmonary effort is normal.   Musculoskeletal:         General: Normal range of motion.      Cervical back: Normal range of motion.   Skin:     Findings: Bruising present.      Comments: Bruise to lower left arm from self-inflicted injury. No longer open. Educated upon signs of infection.    Neurological:      Mental Status: She is alert.   Psychiatric:         Attention and Perception: Attention normal.         Mood and Affect: Mood is anxious and depressed. Affect is flat and tearful.         Speech: Speech normal.         Behavior: Behavior is cooperative.         Thought Content: Thought content normal.         Cognition and Memory: Cognition normal.         Judgment: Judgment normal.         Vitals:  not currently breastfeeding. There is no height or weight on file to calculate BMI.  Due to extenuating circumstances and  possible current health risks associated with the patient being present in a clinical setting (with current health restrictions in place in regards to possible COVID 19 transmission/exposure), the patient was seen remotely today via a MyChart Video Visit through [facility].  Unable to obtain vital signs due to nature of remote visit.  Height stated at 65 inches.  Weight stated at 185 pounds.    Last 3 Blood Pressure Readings:  BP Readings from Last 3 Encounters:   10/26/23 118/65   08/12/23 118/69   08/10/23 126/60       PHQ-9 Score:   PHQ-9 Total Score:   PHQ-9 Depression Screening  Little interest or pleasure in doing things? (P) 3-->nearly every day   Feeling down, depressed, or hopeless? (P) 3-->nearly every day   Trouble falling or staying asleep, or sleeping too much? (P) 0-->not at all   Feeling tired or having little energy? (P) 3-->nearly every day   Poor appetite or overeating? (P) 2-->more than half the days   Feeling bad about yourself - or that you are a failure or have let yourself or your family down? (P) 2-->more than half the days   Trouble concentrating on things, such as reading the newspaper or watching television? (P) 3-->nearly every day   Moving or speaking so slowly that other people could have noticed? Or the opposite - being so fidgety or restless that you have been moving around a lot more than usual? (P) 1-->several days   Thoughts that you would be better off dead, or of hurting yourself in some way? (P) 0-->not at all   PHQ-9 Total Score (P) 17   If you checked off any problems, how difficult have these problems made it for you to do your work, take care of things at home, or get along with other people? (P) extremely difficult         KHOI-7 Score:   Feeling nervous, anxious or on edge: (P) Nearly every day  Not being able to stop or control worrying: (P) Nearly every day  Worrying too much about different things: (P) Nearly every day  Trouble Relaxing: (P) Nearly every day  Being so restless  that it is hard to sit still: (P) Several days  Feeling afraid as if something awful might happen: (P) Several days  Becoming easily annoyed or irritable: (P) Nearly every day  KHOI 7 Total Score: (P) 17  If you checked any problems, how difficult have these problems made it for you to do your work, take care of things at home, or get along with other people: (P) Extremely difficult               Mental Status Exam:   Hygiene:   fair  Cooperation:  Cooperative  Eye Contact:  Poor  Psychomotor Behavior:  Aggitated  Affect:  Blunted  Mood: anxious, irritable, and fluctates  Hopelessness: 8  Speech:  Normal  Thought Process:  Goal directed and Linear  Thought Content:  Mood congruent  Suicidal:  None  Homicidal:  None  Hallucinations:  None  Delusion:  None  Memory:  Intact  Orientation:  Grossly intact  Reliability:  poor  Insight:  Fair  Judgement:  Fair  Impulse Control:  Fair  Physical/Medical Issues:  No        Lab Results:   No visits with results within 6 Month(s) from this visit.   Latest known visit with results is:   Admission on 10/26/2023, Discharged on 10/26/2023   Component Date Value Ref Range Status    Reference Lab Report 10/26/2023    Final                    Value:[facility]  [address] [place] [address]  Phone: [phone] or [phone]  Fax: [phone]  Blade Clarke M.D., Medical Director    PATIENT NAME                           LABORATORY NO.  [id number]  JASMIN VENTURA                       [id number]  [id number]                         AGE              SEX  SSN           CLIENT REF #  [facility] [place]              23      2000  F    [id number]   [id number]    [address]                     REQUESTING M.D.     [place] [address]                   CORETTA CHAMPION  DATE COLLECTED      DATE RECEIVED      DATE REPORTED  10/26/2023          10/26/2023         10/30/2023    DIAGNOSIS:  FALLOPIAN TUBE, EXCISION,  "BILATERAL:  Benign bilateral fallopian tubes with no significant diagnostic alterations    CAM    CLINICAL HISTORY:  Preop examination    SPECIMENS RECEIVED:  FALLOPIAN TUBE, EXCISION, BILATERAL    MICROSCOPIC DESCRIPTION:  Tissue blocks are prepared                           and slides are examined microscopically. See  diagnosis for details.    Professional interpretation rendered by Silva Monroy M.D., SHWETA at  [employer], [address] [place] [id number].    GROSS DESCRIPTION:  Received in formalin labeled \"bilateral fallopian tubes\" are 2 intact and  unoriented fallopian tubes.  Fallopian tube #1 measures 6.2 cm in length and 0.7  cm in diameter.  The serosal surface is tan-purple and smooth with a fimbriated  end.  The fallopian tube is serially sectioned to reveal a pinpoint lumen.  Fallopian tube #2 measures 5.1 cm in length and 0.7 cm in diameter.  The serosal  surface is tan-purple and smooth with a fimbriated end.  The fallopian tube is  serially sectioned to reveal a pinpoint lumen.    Representative sections of the specimen are submitted as follows:  A1 fallopian tube #1 with entire fimbriated end  A2 fallopian tube #2 with entire fimbriated end.  [place]    REVIEWED, DIAGNOSED AND ELECTRONICALLY  SIGNED BY:    Silva Monroy M.D.,                           SHWETA  CPT CODES:  [id number]           Assessment & Plan   Diagnoses and all orders for this visit:    1. Bipolar II disorder (Primary)  -     ARIPiprazole (Abilify) 5 MG tablet; Take 1 tab by mouth daily x 2 weeks, then increase to 2 tabs by mouth daily (together).  Dispense: 30 tablet; Refill: 0    2. Panic anxiety syndrome  -     propranolol (INDERAL) 10 MG tablet; Take 1 tablet by mouth 2 (Two) Times a Day As Needed (anxiety).  Dispense: 60 tablet; Refill: 0    3. Medication management  -     propranolol (INDERAL) 10 MG tablet; Take 1 tablet by mouth 2 (Two) Times a Day As Needed (anxiety).  Dispense: 60 tablet; Refill: 0  -     " ARIPiprazole (Abilify) 5 MG tablet; Take 1 tab by mouth daily x 2 weeks, then increase to 2 tabs by mouth daily (together).  Dispense: 30 tablet; Refill: 0                Visit Diagnoses:    ICD-10-CM ICD-9-CM   1. Bipolar II disorder  F31.81 296.89   2. Panic anxiety syndrome  F41.0 300.01   3. Medication management  Z79.899 V58.69       Abilify 5 mg x 2 weeks then increase to 10 mg daily restarted as patient currently did well previously with this medication.  Lamictal and Remeron discontinued for now.  Vistaril discontinued due to side effects, replaced with propranolol 10 mg twice daily as needed anxiety. Patient is educated upon risks versus benefits as well as side effects and when to seek care in an emergency setting.  Patient verbalized understanding.  Therapy encouraged, patient not yet ready for this.  Instructed need labs previously ordered.  Patient states she recently had labs drawn, discussed retrieving these and having scanned into her my chart prior to next visit.  Follow up with this provider in 4 weeks or sooner if needed.      GOALS:  Short Term Goals: Patient will be compliant with medication, and patient will have no significant medication related side effects.  Patient will be engaged in psychotherapy as indicated.  Patient will report subjective improvement of symptoms.  Long term goals: To stabilize mood and treat/improve subjective symptoms, the patient will stay out of the hospital, the patient will be at an optimal level of functioning, and the patient will take all medications as prescribed.  The patient/guardian verbalized understanding and agreement with goals that were mutually set.        TREATMENT PLAN: Continue supportive psychotherapy efforts and medications as indicated.  Pharmacological and Non-Pharmacological treatment options discussed during today's visit. Patient/Guardian acknowledged and verbally consented with current treatment plan and was educated on the importance of  compliance with treatment and follow-up appointments.      MEDICATION ISSUES:  Discussed medication options and treatment plan of prescribed medication as well as the risks, benefits, any black box warnings, and side effects including potential falls, possible impaired driving, and metabolic adversities among others. Patient is agreeable to call the office with any worsening of symptoms or onset of side effects, or if any concerns or questions arise.  The contact information for the office is made available to the patient. Patient is agreeable to call 911 or go to the nearest ER should they begin having any SI/HI, or if any urgent concerns arise. No medication side effects or related complaints today.     MEDS ORDERED DURING VISIT:  New Medications Ordered This Visit   Medications    propranolol (INDERAL) 10 MG tablet     Sig: Take 1 tablet by mouth 2 (Two) Times a Day As Needed (anxiety).     Dispense:  60 tablet     Refill:  0    ARIPiprazole (Abilify) 5 MG tablet     Sig: Take 1 tab by mouth daily x 2 weeks, then increase to 2 tabs by mouth daily (together).     Dispense:  30 tablet     Refill:  0       Follow Up Appointment:   Return in about 4 weeks (around 9/4/2024) for Recheck.              This document has been electronically signed by MOR Martin  August 7, 2024 15:40 EDT    Some of the data in this electronic note has been brought forward from a previous encounter, any necessary changes have been made, it has been reviewed by this APRN, and it is accurate.    Fax [phone]     Dictated Utilizing Dragon Dictation: Part of this note may be an electronic transcription/translation of spoken language to printed text using the Dragon Dictation System.

## 2024-08-08 ENCOUNTER — PRIOR AUTHORIZATION (OUTPATIENT)
Dept: PSYCHIATRY | Facility: CLINIC | Age: 24
End: 2024-08-08
Payer: COMMERCIAL

## 2024-08-08 NOTE — TELEPHONE ENCOUNTER
PA has been approved. Sent patient a my chart message.  Attempted to contact the pharmacy x2 but did not get an answer nor a voicemail.

## 2024-08-23 DIAGNOSIS — F31.81 BIPOLAR II DISORDER: ICD-10-CM

## 2024-08-23 DIAGNOSIS — Z79.899 MEDICATION MANAGEMENT: ICD-10-CM

## 2024-08-25 RX ORDER — ARIPIPRAZOLE 5 MG/1
TABLET ORAL
Qty: 30 TABLET | Refills: 0 | Status: SHIPPED | OUTPATIENT
Start: 2024-08-25

## 2024-09-05 ENCOUNTER — TELEMEDICINE (OUTPATIENT)
Dept: PSYCHIATRY | Facility: CLINIC | Age: 24
End: 2024-09-05
Payer: COMMERCIAL

## 2024-09-05 DIAGNOSIS — F31.81 BIPOLAR II DISORDER: Primary | ICD-10-CM

## 2024-09-05 DIAGNOSIS — Z79.899 MEDICATION MANAGEMENT: ICD-10-CM

## 2024-09-05 DIAGNOSIS — F41.0 PANIC ANXIETY SYNDROME: ICD-10-CM

## 2024-09-05 RX ORDER — PROPRANOLOL HYDROCHLORIDE 10 MG/1
10 TABLET ORAL 3 TIMES DAILY
Qty: 90 TABLET | Refills: 0 | Status: SHIPPED | OUTPATIENT
Start: 2024-09-05

## 2024-09-05 RX ORDER — LAMOTRIGINE 25 MG/1
TABLET ORAL
Qty: 42 TABLET | Refills: 0 | Status: SHIPPED | OUTPATIENT
Start: 2024-09-05

## 2024-09-05 RX ORDER — ARIPIPRAZOLE 5 MG/1
TABLET ORAL
Qty: 30 TABLET | Refills: 0 | Status: SHIPPED | OUTPATIENT
Start: 2024-09-05

## 2024-09-05 RX ORDER — DOCUSATE SODIUM 100 MG/1
CAPSULE, LIQUID FILLED ORAL
COMMUNITY
Start: 2024-08-09

## 2024-09-05 NOTE — PROGRESS NOTES
"This provider is located at the [facility] (through [facility]), [address], [place], [address] using a secure video visit through [employer]. The patient's condition being consulted/diagnosed/treated is appropriate for telemedicine. The provider identified herself as well as her credentials.   The patient, and/or patients guardian, consent to be seen remotely, and when consent is given they understand that the consent allows for patient identifiable information to be sent to a third party as needed. Patient is being seen remotely via telehealth at their home address in [place], and stated they are in a secure environment for this session. They may refuse to be seen remotely at any time. The electronic data is encrypted and password protected, and the patient and/or guardian has been advised of the potential risks to privacy not withstanding such measures.   The use of a video visit has been reviewed with the patient and verbal informed consent has been obtained.   Patient identifiers used: Name and .    Subjective   Bianca Nieves is a 24 y.o. female who presents today for follow up    Chief Complaint:    Chief Complaint   Patient presents with    Anxiety    Depression    Irritable    Med Management    Sleeping Problem        History of Present Illness:    History of Present Illness  Patient is a 24-year-old female presenting for a follow-up related to mood lability, hypomania, sleep disturbance, depression, anxiety, and for medication management.  Has tolerated initiation of Abilify well, forgot to increase to 10 mg daily but has been consistent in taking 5 mg daily.  Denies symptoms consistent with EPS or TD K.  When asked about side effects, she cites \"body odor.\"  Has noticed efficacy with use, and states she does not want to change the medication due to this condition.  When asked of efficacy \"I am not as irrational, I do not wigged out.\"  Hopelessness decreased " "as well as intrusive thoughts \"not really.\"  Still avoiding stores due to anxiety, also states \"my job stresses me out.\"  Has been utilizing propranolol typically once daily \"to make myself feel better.\"  States she struggles with anxiety \"constantly at work.\"  Propranolol effective in addressing.  Denies full-fledged panic episodes.  Sleep impacted due to her schedule, she denies this is due to sleep difficulties \"I do get tired.\"  Cites 5 hours nightly on average due to recently starting school and having to study at night.  States that she is \"tearful all the time, I am still an anxious mess.\"  She denies SI, HI, SIB, or hallucinations currently and is convincing.  Has had approximately a 20 pound weight loss due to weight loss medication, poor appetite due to the circumstances.  PHQ reduced from 17-12, indicating moderate depression which patient rates as 7/10 on average.  Cites the last week has been difficult due to spouse forgetting her birthday.  KHOI reduced from 1716, indicating severe anxiety which patient rates a 9/10 on average.  Denies medical status changes or concerns.    Patient now resides in a house with her 4 young children and  who is the father of her youngest 2 children. They just moved to [place] recently. They plan to have a wedding ceremony in March.  She is not breast-feeding. 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  Denies alcohol or drug use.  Currently in school online pursuing her bachelor's in healthcare administration.  Not currently in therapy.      Last Menstrual Period: \"last month\"-tubal ligation    The patient denies any chance of pregnancy at this time.  The patient was educated that her prescribed medications can have potential risk to a developing fetus. The patient is advised to contact this APRN/this office if she becomes pregnant or plans to become pregnant.  Pt verbalizes understanding and acknowledged " agreement with this plan in her own words.    The following portions of the patient's history were reviewed and updated as appropriate: allergies, current medications, past family history, past medical history, past social history, past surgical history and problem list.    Past Psychiatric History:  Began Treatment:   Diagnoses: Bipolar II  Psychiatrist:Mariano  Therapist:Denies  Admission History:Denies  Medication Trials: paxil, hydroxyzine, zoloft, trazodone, lexapro 10 (didn't work), vistaril 25 (helped, made tired), remeron 15 (worked-no longer needed)  Self Harm:  last week  Suicide Attempts:Denies   Psychosis, Anxiety, Depression:  depression and anxiety, denies psychosis    Past Medical History:  Past Medical History:   Diagnosis Date    Anxiety     Depression     H/O seasonal allergies     PPH (postpartum hemorrhage)     Psoriasis     Urinary tract infection        Substance Abuse History:   Types:Denies all, including illicit  Withdrawal Symptoms:Denies  Longest Period Sober:Not Applicable   AA: Not applicable     Social History:  Social History     Socioeconomic History    Marital status:      Spouse name: Bill David    Number of children: 3    Highest education level: High school graduate   Tobacco Use    Smoking status: Former     Current packs/day: 0.75     Average packs/day: 0.8 packs/day for 4.0 years (3.0 ttl pk-yrs)     Types: Cigarettes     Passive exposure: Never    Smokeless tobacco: Never    Tobacco comments:     PT STATES IS AROUND SMOKING EVERY DAY   Vaping Use    Vaping status: Some Days    Substances: Nicotine, Flavoring    Devices: Disposable   Substance and Sexual Activity    Alcohol use: Yes     Comment: occasionally    Drug use: No    Sexual activity: Defer     Partners: Male     Birth control/protection: None       Family History:  Family History   Problem Relation Age of Onset    No Known Problems Mother     Alcohol abuse Father     Diabetes Father     Asthma  Maternal Grandmother     Diabetes Paternal Grandmother     Asthma Paternal Grandmother        Past Surgical History:  Past Surgical History:   Procedure Laterality Date    DILATATION AND CURETTAGE N/A 2017    Procedure: DILATATION AND CURETTAGE;  Surgeon: Juan Pablo Wood MD;  Location: [facility] OR;  Service:     HEAD/NECK LESION/CYST EXCISION N/A 2020    Procedure: FACIAL LESION/CYST EXCISION;  Surgeon: Jay Cohen MD;  Location: [facility] OR;  Service: General;  Laterality: N/A;    SALPINGECTOMY Bilateral 10/26/2023    Procedure: SALPINGECTOMY LAPAROSCOPIC;  Surgeon: Crystal Hoyos DO;  Location: [facility] OR;  Service: Obstetrics/Gynecology;  Laterality: Bilateral;       Problem List:  Patient Active Problem List   Diagnosis    Right upper quadrant abdominal pain    Leukocytosis    Abdominal pain affecting pregnancy    Pregnancy    Abdominal pain during pregnancy    Visit for wound check    UTI (urinary tract infection)     labor    Threatened  labor, third trimester    33 weeks gestation of pregnancy    Normal labor     labor in third trimester    37 weeks gestation of pregnancy    Generalized anxiety disorder    Abdominal pain during pregnancy in third trimester    Uterine cramping     contractions    Uterine contractions       Allergy:   No Known Allergies     Current Medications:   Current Outpatient Medications   Medication Sig Dispense Refill    ARIPiprazole (ABILIFY) 5 MG tablet TAKE 1 TABLET BY MOUTH DAILY 30 tablet 0    docusate sodium (COLACE) 100 MG capsule       propranolol (INDERAL) 10 MG tablet Take 1 tablet by mouth 3 (Three) Times a Day. 90 tablet 0    ibuprofen (ADVIL,MOTRIN) 600 MG tablet Take 1 tablet by mouth Every 6 (Six) Hours As Needed for Mild Pain. 60 tablet 0    lamoTRIgine (LaMICtal) 25 MG tablet Take 1 tab by mouth daily x 2 weeks, if well tolerated increase to 1 tab by mouth twice daily. 42 tablet 0    Multiple Vitamins-Minerals  (Multivitamin Gummies Adult) chewable tablet Chew 1 tablet/day.      SEMAGLUTIDE-WEIGHT MANAGEMENT SC Inject  under the skin into the appropriate area as directed.      triamcinolone (KENALOG) 0.5 % cream        No current facility-administered medications for this visit.       Review of Systems:    Review of Systems   Constitutional:  Positive for fatigue.   HENT: Negative.     Eyes: Negative.    Respiratory: Negative.     Cardiovascular: Negative.    Gastrointestinal: Negative.    Endocrine: Negative.    Genitourinary: Negative.    Musculoskeletal: Negative.    Skin:  Positive for rash.        Legs, elbow, eyebrow   Allergic/Immunologic: Negative.    Neurological:  Negative for seizures.   Hematological: Negative.    Psychiatric/Behavioral:  Positive for sleep disturbance and stress. The patient is nervous/anxious.    All other systems reviewed and are negative.        Physical Exam:   Physical Exam  Constitutional:       Appearance: Normal appearance.   HENT:      Head: Normocephalic.      Nose: Nose normal.   Pulmonary:      Effort: Pulmonary effort is normal.   Musculoskeletal:         General: Normal range of motion.      Cervical back: Normal range of motion.   Skin:     Findings: Bruising present.      Comments: Bruise to lower left arm from self-inflicted injury. No longer open. Educated upon signs of infection.    Neurological:      Mental Status: She is alert.   Psychiatric:         Attention and Perception: Attention normal.         Mood and Affect: Mood is anxious.         Speech: Speech normal.         Behavior: Behavior is cooperative.         Thought Content: Thought content normal.         Cognition and Memory: Cognition normal.         Judgment: Judgment normal.         Vitals:  not currently breastfeeding. There is no height or weight on file to calculate BMI.  Due to extenuating circumstances and possible current health risks associated with the patient being present in a clinical setting (with  current health restrictions in place in regards to possible COVID 19 transmission/exposure), the patient was seen remotely today via a MyChart Video Visit through [facility].  Unable to obtain vital signs due to nature of remote visit.  Height stated at 65 inches.  Weight stated at 185 pounds.    Last 3 Blood Pressure Readings:  BP Readings from Last 3 Encounters:   10/26/23 118/65   08/12/23 118/69   08/10/23 126/60       PHQ-9 Score:   PHQ-9 Total Score:   PHQ-9 Depression Screening  Little interest or pleasure in doing things? (P) 1-->several days   Feeling down, depressed, or hopeless? (P) 2-->more than half the days   Trouble falling or staying asleep, or sleeping too much? (P) 1-->several days   Feeling tired or having little energy? (P) 1-->several days   Poor appetite or overeating? (P) 3-->nearly every day   Feeling bad about yourself - or that you are a failure or have let yourself or your family down? (P) 1-->several days   Trouble concentrating on things, such as reading the newspaper or watching television? (P) 2-->more than half the days   Moving or speaking so slowly that other people could have noticed? Or the opposite - being so fidgety or restless that you have been moving around a lot more than usual? (P) 1-->several days   Thoughts that you would be better off dead, or of hurting yourself in some way? (P) 0-->not at all   PHQ-9 Total Score (P) 12   If you checked off any problems, how difficult have these problems made it for you to do your work, take care of things at home, or get along with other people? (P) very difficult         KHOI-7 Score:   Feeling nervous, anxious or on edge: (P) More than half the days  Not being able to stop or control worrying: (P) More than half the days  Worrying too much about different things: (P) More than half the days  Trouble Relaxing: (P) Nearly every day  Being so restless that it is hard to sit still: (P) Nearly every day  Feeling afraid as if something awful  might happen: (P) Several days  Becoming easily annoyed or irritable: (P) Nearly every day  KHOI 7 Total Score: (P) 16  If you checked any problems, how difficult have these problems made it for you to do your work, take care of things at home, or get along with other people: (P) Extremely difficult               Mental Status Exam:   Hygiene:   fair  Cooperation:  Cooperative  Eye Contact:  Poor  Psychomotor Behavior:  Aggitated  Affect:   flat but improved  Mood: anxious  Hopelessness: 4  Speech:  Normal  Thought Process:  Goal directed and Linear  Thought Content:  Mood congruent  Suicidal:  None  Homicidal:  None  Hallucinations:  None  Delusion:  None  Memory:  Intact  Orientation:  Grossly intact  Reliability:  poor  Insight:  Fair  Judgement:  Fair  Impulse Control:  Fair  Physical/Medical Issues:  No        Lab Results:   No visits with results within 6 Month(s) from this visit.   Latest known visit with results is:   Admission on 10/26/2023, Discharged on 10/26/2023   Component Date Value Ref Range Status    Reference Lab Report 10/26/2023    Final                    Value:[facility]  [address] [place] [address]  Phone: [phone] or [phone]  Fax: [phone]  Blade Clarke M.D., Medical Director    PATIENT NAME                           LABORATORY NO.  [id number]  JASMIN VENTURA                       [id number]  [id number]                         AGE              SEX  SSN           CLIENT REF #  [facility]              23      2000  F    [id number]   [id number]    [address]                     REQUESTING M.D.     ATTENDING [place] [address]                   CORETTA CHAMPION  DATE COLLECTED      DATE RECEIVED      DATE REPORTED  10/26/2023          10/26/2023         10/30/2023    DIAGNOSIS:  FALLOPIAN TUBE, EXCISION, BILATERAL:  Benign bilateral fallopian tubes with no significant diagnostic  "alterations    CAM    CLINICAL HISTORY:  Preop examination    SPECIMENS RECEIVED:  FALLOPIAN TUBE, EXCISION, BILATERAL    MICROSCOPIC DESCRIPTION:  Tissue blocks are prepared                           and slides are examined microscopically. See  diagnosis for details.    Professional interpretation rendered by Silva Monroy M.D., SHWETA at  [email or web address], [address] [place] [id number].    GROSS DESCRIPTION:  Received in formalin labeled \"bilateral fallopian tubes\" are 2 intact and  unoriented fallopian tubes.  Fallopian tube #1 measures 6.2 cm in length and 0.7  cm in diameter.  The serosal surface is tan-purple and smooth with a fimbriated  end.  The fallopian tube is serially sectioned to reveal a pinpoint lumen.  Fallopian tube #2 measures 5.1 cm in length and 0.7 cm in diameter.  The serosal  surface is tan-purple and smooth with a fimbriated end.  The fallopian tube is  serially sectioned to reveal a pinpoint lumen.    Representative sections of the specimen are submitted as follows:  A1 fallopian tube #1 with entire fimbriated end  A2 fallopian tube #2 with entire fimbriated end.  [place]    REVIEWED, DIAGNOSED AND ELECTRONICALLY  SIGNED BY:    Silva Monroy M.D.,                           SHWETA  CPT CODES:  [id number]           Assessment & Plan   Diagnoses and all orders for this visit:    1. Bipolar II disorder (Primary)  -     ARIPiprazole (ABILIFY) 5 MG tablet; TAKE 1 TABLET BY MOUTH DAILY  Dispense: 30 tablet; Refill: 0  -     lamoTRIgine (LaMICtal) 25 MG tablet; Take 1 tab by mouth daily x 2 weeks, if well tolerated increase to 1 tab by mouth twice daily.  Dispense: 42 tablet; Refill: 0    2. Panic anxiety syndrome  -     propranolol (INDERAL) 10 MG tablet; Take 1 tablet by mouth 3 (Three) Times a Day.  Dispense: 90 tablet; Refill: 0    3. Medication management  -     ARIPiprazole (ABILIFY) 5 MG tablet; TAKE 1 TABLET BY MOUTH DAILY  Dispense: 30 tablet; Refill: 0  -     propranolol (INDERAL) " 10 MG tablet; Take 1 tablet by mouth 3 (Three) Times a Day.  Dispense: 90 tablet; Refill: 0  -     lamoTRIgine (LaMICtal) 25 MG tablet; Take 1 tab by mouth daily x 2 weeks, if well tolerated increase to 1 tab by mouth twice daily.  Dispense: 42 tablet; Refill: 0    Other orders  -     LABS SCANNED                  Visit Diagnoses:    ICD-10-CM ICD-9-CM   1. Bipolar II disorder  F31.81 296.89   2. Panic anxiety syndrome  F41.0 300.01   3. Medication management  Z79.899 V58.69       Abilify refilled at 5 mg as this is the dosage patient is currently taking.  We will increase frequency of propranolol, patient to take 10 mg 3 times daily as needed anxiety.  Lamictal reinitiated at 25 mg daily x 2 weeks and if well tolerated increase to 25 mg twice daily daily thereafter.  Previously tolerated this medication well. Patient is educated upon risks versus benefits as well as side effects and when to seek care in an emergency setting.  Patient verbalized understanding.  Follow up with this provider in 4 weeks or sooner if needed.    GOALS:  Short Term Goals: Patient will be compliant with medication, and patient will have no significant medication related side effects.  Patient will be engaged in psychotherapy as indicated.  Patient will report subjective improvement of symptoms.  Long term goals: To stabilize mood and treat/improve subjective symptoms, the patient will stay out of the hospital, the patient will be at an optimal level of functioning, and the patient will take all medications as prescribed.  The patient/guardian verbalized understanding and agreement with goals that were mutually set.        TREATMENT PLAN: Continue supportive psychotherapy efforts and medications as indicated.  Pharmacological and Non-Pharmacological treatment options discussed during today's visit. Patient/Guardian acknowledged and verbally consented with current treatment plan and was educated on the importance of compliance with treatment  and follow-up appointments.      MEDICATION ISSUES:  Discussed medication options and treatment plan of prescribed medication as well as the risks, benefits, any black box warnings, and side effects including potential falls, possible impaired driving, and metabolic adversities among others. Patient is agreeable to call the office with any worsening of symptoms or onset of side effects, or if any concerns or questions arise.  The contact information for the office is made available to the patient. Patient is agreeable to call 911 or go to the nearest ER should they begin having any SI/HI, or if any urgent concerns arise. No medication side effects or related complaints today.     MEDS ORDERED DURING VISIT:  New Medications Ordered This Visit   Medications    ARIPiprazole (ABILIFY) 5 MG tablet     Sig: TAKE 1 TABLET BY MOUTH DAILY     Dispense:  30 tablet     Refill:  0    propranolol (INDERAL) 10 MG tablet     Sig: Take 1 tablet by mouth 3 (Three) Times a Day.     Dispense:  90 tablet     Refill:  0    lamoTRIgine (LaMICtal) 25 MG tablet     Sig: Take 1 tab by mouth daily x 2 weeks, if well tolerated increase to 1 tab by mouth twice daily.     Dispense:  42 tablet     Refill:  0       Follow Up Appointment:   Return in about 4 weeks (around 10/3/2024) for Recheck.              This document has been electronically signed by MOR Martin  September 5, 2024 11:59 EDT    Some of the data in this electronic note has been brought forward from a previous encounter, any necessary changes have been made, it has been reviewed by this APRN, and it is accurate.    Fax [phone]       Dictated Utilizing Dragon Dictation: Part of this note may be an electronic transcription/translation of spoken language to printed text using the Dragon Dictation System.

## (undated) DEVICE — ST TBG PNEUMOCLEAR EVAC SMOKE HIFLO

## (undated) DEVICE — HOLDER: Brand: DEROYAL

## (undated) DEVICE — PK HD AND NK 70

## (undated) DEVICE — ENCORE® LATEX MICRO SIZE 7.5, STERILE LATEX POWDER-FREE SURGICAL GLOVE: Brand: ENCORE

## (undated) DEVICE — GLV SURG PREMIERPRO MIC LTX PF SZ7.5 BRN

## (undated) DEVICE — ENDOPATH XCEL UNIVERSAL TROCAR STABLILITY SLEEVES: Brand: ENDOPATH XCEL

## (undated) DEVICE — SUT ETHLN 4/0 P3 18IN 699G

## (undated) DEVICE — ENDOPATH XCEL BLADELESS TROCARS WITH STABILITY SLEEVES: Brand: ENDOPATH XCEL

## (undated) DEVICE — GAUZE,SPONGE,4"X4",16PLY,XRAY,STRL,LF: Brand: MEDLINE

## (undated) DEVICE — COR MINOR LITHOTOMY: Brand: MEDLINE INDUSTRIES, INC.

## (undated) DEVICE — APPL CHLORAPREP HI/LITE 26ML ORNG

## (undated) DEVICE — GLV SURG PREMIERPRO MIC LTX PF SZ6.5 BRN

## (undated) DEVICE — ADHS SKIN PREMIERPRO EXOFIN TOPICAL HI/VISC .5ML

## (undated) DEVICE — ENSEAL X1 TISSUE SEALER, CURVED JAW, 37 CM SHAFT LENGTH: Brand: ENSEAL

## (undated) DEVICE — PAD SANI MAXI W/ADHS SNG WRP 11IN

## (undated) DEVICE — SUT MNCRYL 4/0 PS2 18 IN

## (undated) DEVICE — COR GYN LAPAROSCOPY: Brand: MEDLINE INDUSTRIES, INC.

## (undated) DEVICE — 40595 XL TRENDELENBURG POSITIONING KIT: Brand: 40595 XL TRENDELENBURG POSITIONING KIT